# Patient Record
Sex: FEMALE | Race: WHITE | Employment: UNEMPLOYED | URBAN - NONMETROPOLITAN AREA
[De-identification: names, ages, dates, MRNs, and addresses within clinical notes are randomized per-mention and may not be internally consistent; named-entity substitution may affect disease eponyms.]

---

## 2021-04-13 ENCOUNTER — OFFICE VISIT (OUTPATIENT)
Dept: PRIMARY CARE CLINIC | Age: 51
End: 2021-04-13
Payer: COMMERCIAL

## 2021-04-13 ENCOUNTER — TRANSCRIBE ORDERS (OUTPATIENT)
Dept: ADMINISTRATIVE | Facility: HOSPITAL | Age: 51
End: 2021-04-13

## 2021-04-13 DIAGNOSIS — Z12.31 SCREENING MAMMOGRAM, ENCOUNTER FOR: ICD-10-CM

## 2021-04-13 DIAGNOSIS — Z12.4 SCREENING FOR MALIGNANT NEOPLASM OF CERVIX: ICD-10-CM

## 2021-04-13 DIAGNOSIS — I10 ESSENTIAL HYPERTENSION: ICD-10-CM

## 2021-04-13 DIAGNOSIS — Z13.1 SCREENING FOR DIABETES MELLITUS: ICD-10-CM

## 2021-04-13 DIAGNOSIS — Z13.220 ENCOUNTER FOR SCREENING FOR LIPID DISORDER: ICD-10-CM

## 2021-04-13 DIAGNOSIS — E55.9 VITAMIN D DEFICIENCY: ICD-10-CM

## 2021-04-13 DIAGNOSIS — Z00.00 WELL ADULT HEALTH CHECK: Primary | ICD-10-CM

## 2021-04-13 DIAGNOSIS — Z12.31 BREAST CANCER SCREENING BY MAMMOGRAM: Primary | ICD-10-CM

## 2021-04-13 DIAGNOSIS — Z00.00 WELL ADULT EXAM: ICD-10-CM

## 2021-04-13 DIAGNOSIS — R63.5 WEIGHT GAIN: ICD-10-CM

## 2021-04-13 LAB
ALBUMIN SERPL-MCNC: 4.6 G/DL (ref 3.5–5.2)
ALP BLD-CCNC: 122 U/L (ref 35–104)
ALT SERPL-CCNC: 27 U/L (ref 5–33)
ANION GAP SERPL CALCULATED.3IONS-SCNC: 17 MMOL/L (ref 7–19)
AST SERPL-CCNC: 28 U/L (ref 5–32)
BILIRUB SERPL-MCNC: 0.4 MG/DL (ref 0.2–1.2)
BUN BLDV-MCNC: 12 MG/DL (ref 6–20)
CALCIUM SERPL-MCNC: 9.7 MG/DL (ref 8.6–10)
CHLORIDE BLD-SCNC: 104 MMOL/L (ref 98–111)
CHOLESTEROL, TOTAL: 201 MG/DL (ref 160–199)
CO2: 23 MMOL/L (ref 22–29)
CREAT SERPL-MCNC: 0.7 MG/DL (ref 0.5–0.9)
GFR AFRICAN AMERICAN: >59
GFR NON-AFRICAN AMERICAN: >60
GLUCOSE BLD-MCNC: 86 MG/DL (ref 74–109)
HDLC SERPL-MCNC: 55 MG/DL (ref 65–121)
LDL CHOLESTEROL CALCULATED: 132 MG/DL
POTASSIUM SERPL-SCNC: 4 MMOL/L (ref 3.5–5)
SODIUM BLD-SCNC: 144 MMOL/L (ref 136–145)
TOTAL PROTEIN: 8.2 G/DL (ref 6.6–8.7)
TRIGL SERPL-MCNC: 68 MG/DL (ref 0–149)
TSH SERPL DL<=0.05 MIU/L-ACNC: 2.15 UIU/ML (ref 0.27–4.2)
VITAMIN D 25-HYDROXY: 49.6 NG/ML

## 2021-04-13 PROCEDURE — 36415 COLL VENOUS BLD VENIPUNCTURE: CPT | Performed by: NURSE PRACTITIONER

## 2021-04-13 PROCEDURE — 99386 PREV VISIT NEW AGE 40-64: CPT | Performed by: NURSE PRACTITIONER

## 2021-04-13 RX ORDER — ERGOCALCIFEROL 1.25 MG/1
50000 CAPSULE ORAL WEEKLY
Qty: 12 CAPSULE | Refills: 1 | Status: ON HOLD | OUTPATIENT
Start: 2021-04-13 | End: 2021-07-28

## 2021-04-13 RX ORDER — LISINOPRIL 10 MG/1
10 TABLET ORAL DAILY
Qty: 30 TABLET | Refills: 5 | Status: SHIPPED | OUTPATIENT
Start: 2021-04-13 | End: 2021-05-06 | Stop reason: ALTCHOICE

## 2021-04-13 ASSESSMENT — PATIENT HEALTH QUESTIONNAIRE - PHQ9
SUM OF ALL RESPONSES TO PHQ QUESTIONS 1-9: 0
1. LITTLE INTEREST OR PLEASURE IN DOING THINGS: 0
2. FEELING DOWN, DEPRESSED OR HOPELESS: 0

## 2021-04-13 NOTE — PROGRESS NOTES
[facility] PHYSICIAN SERVICES  [facility]  [address] [place]  [address] [place] [address]  Dept: [phone]  Dept Fax: [phone]  Loc: [phone]    Disha Russell is a 52 y.o. female who presents today for her medical conditions/complaints as noted below. Disha Russell is c/o of Established New Doctor (patient presents today to establish care. Former patient of Dr. Honey Bro. Patient states that she didnt have a PCP. She is needing a pap and mammogram. Patient is fasting for labs. )        HPI:     HPI   Chief Complaint   Patient presents with    Established New Doctor     patient presents today to establish care. Former patient of Dr. Honey Bro. Patient states that she didnt have a PCP. She is needing a pap and mammogram. Patient is fasting for labs. History reviewed. No pertinent past medical history. Past Surgical History:   Procedure Laterality Date     SECTION      DILATION AND CURETTAGE OF UTERUS      X2       Vitals    SYSTOLIC 380   DIASTOLIC 871   Pulse 82   Temp 97.7   Resp 16   SpO2 98   Weight 187 lb   Height 5' 5.5\"   Body mass index 30.64 kg/m2       Family History   Problem Relation Age of Onset    Hypertension Mother     Thyroid Disease Sister        Social History     Tobacco Use    Smoking status: Never Smoker    Smokeless tobacco: Never Used   Substance Use Topics    Alcohol use: Never     Frequency: Never      Current Outpatient Medications   Medication Sig Dispense Refill    lisinopril (PRINIVIL;ZESTRIL) 10 MG tablet Take 1 tablet by mouth daily 30 tablet 5    vitamin D (ERGOCALCIFEROL) 1.25 MG (13636 UT) CAPS capsule Take 1 capsule by mouth once a week 12 capsule 1     No current facility-administered medications for this visit.       No Known Allergies    Health Maintenance   Topic Date Due    Hepatitis C screen  Never done    HIV screen  Never done    DTaP/Tdap/Td vaccine (1 - Tdap) Never done    Cervical cancer screen  Never done    Flu vaccine (Season Ended) 09/01/2021    Potassium monitoring  04/13/2022    Creatinine monitoring  04/13/2022    Lipid screen  04/13/2026    COVID-19 Vaccine  Completed    Hepatitis A vaccine  Aged Out    Hepatitis B vaccine  Aged Out    Hib vaccine  Aged Out    Meningococcal (ACWY) vaccine  Aged Out    Pneumococcal 0-64 years Vaccine  Aged Out       Subjective:      Review of Systems   Constitutional: Negative. HENT: Negative. Eyes: Negative. Respiratory: Negative. Cardiovascular:        Elevated bp   Gastrointestinal: Negative. Genitourinary: Negative. Musculoskeletal: Negative. Skin: Negative. Neurological: Negative. Psychiatric/Behavioral: Negative. Objective:     Physical Exam  Vitals signs and nursing note reviewed. Exam conducted with a chaperone present. Constitutional:       Appearance: She is well-developed. HENT:      Head: Normocephalic. Right Ear: Tympanic membrane and external ear normal.      Left Ear: External ear normal.      Nose: Nose normal.   Eyes:      Pupils: Pupils are equal, round, and reactive to light. Neck:      Musculoskeletal: Normal range of motion. Cardiovascular:      Rate and Rhythm: Normal rate and regular rhythm. Pulses: Normal pulses. Heart sounds: Normal heart sounds. Pulmonary:      Effort: Pulmonary effort is normal.      Breath sounds: Normal breath sounds. Chest:      Breasts:         Right: Normal.         Left: Normal.   Abdominal:      General: Abdomen is flat. Bowel sounds are normal.   Genitourinary:     Labia:         Right: No rash, tenderness, lesion or injury. Left: No rash, tenderness, lesion or injury. Vagina: Normal.      Cervix: Normal.      Uterus: Normal.       Adnexa: Right adnexa normal and left adnexa normal.      Comments: Pap smear obtained from the cervix  Musculoskeletal: Normal range of motion. Skin:     General: Skin is warm and dry.       Capillary Refill: Capillary refill takes less than 2 seconds. Neurological:      General: No focal deficit present. Mental Status: She is alert and oriented to person, place, and time. Psychiatric:         Mood and Affect: Mood normal.         Behavior: Behavior normal.         Thought Content: Thought content normal.         Judgment: Judgment normal.       BP (!) 133/100   Pulse 82   Temp 97.7 °F (36.5 °C) (Temporal)   Resp 16   Ht 5' 5.5\" (1.664 m)   Wt 187 lb (84.8 kg)   SpO2 98%   Breastfeeding No   BMI 30.65 kg/m²     Assessment:       Diagnosis Orders   1. Well adult health check     2. Screening mammogram, encounter for  RAIN DIGITAL SCREEN W OR WO CAD BILATERAL   3. Well adult exam     4. Encounter for screening for lipid disorder  Lipid Panel   5. Screening for diabetes mellitus  Comprehensive Metabolic Panel   6. Weight gain  TSH without Reflex   7. Screening for malignant neoplasm of cervix  PAP SMEAR   8. Essential hypertension     9. Vitamin D deficiency  Vitamin D 25 Hydroxy         Plan:       Incidentally her blood pressure was found to be elevated today and on recheck it was also elevated. See the plan below     Patient given educational materials -see patient instructions. Discussed use, benefit, and side effects of prescribed medications. All patient questions answered. Pt voiced understanding. Reviewed health maintenance. Instructed to continue currentmedications, diet and exercise. Patient agreed with treatment plan. Follow up as directed.    MEDICATIONS:  Orders Placed This Encounter   Medications    lisinopril (PRINIVIL;ZESTRIL) 10 MG tablet     Sig: Take 1 tablet by mouth daily     Dispense:  30 tablet     Refill:  5    vitamin D (ERGOCALCIFEROL) 1.25 MG (06931 UT) CAPS capsule     Sig: Take 1 capsule by mouth once a week     Dispense:  12 capsule     Refill:  1         ORDERS:  Orders Placed This Encounter   Procedures    RAIN DIGITAL SCREEN W OR WO CAD BILATERAL    Comprehensive Metabolic Panel    Lipid Panel    TSH without Reflex    PAP SMEAR    Vitamin D 25 Hydroxy       Follow-up:  Return in about 1 year (around 4/13/2022). PATIENT INSTRUCTIONS:  Patient Instructions   Start the lisinopril daily  Monitor your blood pressure every a.m And once random during the day. Record them. The goal is top number under 140 and bottom number under 80. Will take about 3 days to get to goal. Call or send a [facility] message with bp in the next week. Work on diet and exercise  Continue vit d rx. Avoid any deconestants     Patient Education        DASH Diet: Care Instructions  Your Care Instructions     The DASH diet is an eating plan that can help lower your blood pressure. DASH stands for Dietary Approaches to Stop Hypertension. Hypertension is high blood pressure. The DASH diet focuses on eating foods that are high in calcium, potassium, and magnesium. These nutrients can lower blood pressure. The foods that are highest in these nutrients are fruits, vegetables, low-fat dairy products, nuts, seeds, and legumes. But taking calcium, potassium, and magnesium supplements instead of eating foods that are high in those nutrients does not have the same effect. The DASH diet also includes whole grains, fish, and poultry. The DASH diet is one of several lifestyle changes your doctor may recommend to lower your high blood pressure. Your doctor may also want you to decrease the amount of sodium in your diet. Lowering sodium while following the DASH diet can lower blood pressure even further than just the DASH diet alone. Follow-up care is a key part of your treatment and safety. Be sure to make and go to all appointments, and call your doctor if you are having problems. It's also a good idea to know your test results and keep a list of the medicines you take. How can you care for yourself at home? Following the DASH diet  · Eat 4 to 5 servings of fruit each day.  A serving is 1 medium-sized piece of fruit, ½ cup chopped or canned fruit, 1/4 cup dried fruit, or 4 ounces (½ cup) of fruit juice. Choose fruit more often than fruit juice. · Eat 4 to 5 servings of vegetables each day. A serving is 1 cup of lettuce or raw leafy vegetables, ½ cup of chopped or cooked vegetables, or 4 ounces (½ cup) of vegetable juice. Choose vegetables more often than vegetable juice. · Get 2 to 3 servings of low-fat and fat-free dairy each day. A serving is 8 ounces of milk, 1 cup of yogurt, or 1 ½ ounces of cheese. · Eat 6 to 8 servings of grains each day. A serving is 1 slice of bread, 1 ounce of dry cereal, or ½ cup of cooked rice, pasta, or cooked cereal. Try to choose whole-grain products as much as possible. · Limit lean meat, poultry, and fish to 2 servings each day. A serving is 3 ounces, about the size of a deck of cards. · Eat 4 to 5 servings of nuts, seeds, and legumes (cooked dried beans, lentils, and split peas) each week. A serving is 1/3 cup of nuts, 2 tablespoons of seeds, or ½ cup of cooked beans or peas. · Limit fats and oils to 2 to 3 servings each day. A serving is 1 teaspoon of vegetable oil or 2 tablespoons of salad dressing. · Limit sweets and added sugars to 5 servings or less a week. A serving is 1 tablespoon jelly or jam, ½ cup sorbet, or 1 cup of lemonade. · Eat less than 2,300 milligrams (mg) of sodium a day. If you limit your sodium to 1,500 mg a day, you can lower your blood pressure even more. · Be aware that all of these are the suggested number of servings for people who eat 1,800 to 2,000 calories a day. Your recommended number of servings may be different if you need more or fewer calories. Tips for success  · Start small. Do not try to make dramatic changes to your diet all at once. You might feel that you are missing out on your favorite foods and then be more likely to not follow the plan. Make small changes, and stick with them. Once those changes become habit, add a few more changes.   · Try language to printed texts. The electronic translation of spoken language may be erroneous, or at times, nonsensical words or phrases may be inadvertentlytranscribed.   Although I have reviewed the note for such errors, some may still exist.

## 2021-04-13 NOTE — PATIENT INSTRUCTIONS
of lettuce or raw leafy vegetables, ½ cup of chopped or cooked vegetables, or 4 ounces (½ cup) of vegetable juice. Choose vegetables more often than vegetable juice. · Get 2 to 3 servings of low-fat and fat-free dairy each day. A serving is 8 ounces of milk, 1 cup of yogurt, or 1 ½ ounces of cheese. · Eat 6 to 8 servings of grains each day. A serving is 1 slice of bread, 1 ounce of dry cereal, or ½ cup of cooked rice, pasta, or cooked cereal. Try to choose whole-grain products as much as possible. · Limit lean meat, poultry, and fish to 2 servings each day. A serving is 3 ounces, about the size of a deck of cards. · Eat 4 to 5 servings of nuts, seeds, and legumes (cooked dried beans, lentils, and split peas) each week. A serving is 1/3 cup of nuts, 2 tablespoons of seeds, or ½ cup of cooked beans or peas. · Limit fats and oils to 2 to 3 servings each day. A serving is 1 teaspoon of vegetable oil or 2 tablespoons of salad dressing. · Limit sweets and added sugars to 5 servings or less a week. A serving is 1 tablespoon jelly or jam, ½ cup sorbet, or 1 cup of lemonade. · Eat less than 2,300 milligrams (mg) of sodium a day. If you limit your sodium to 1,500 mg a day, you can lower your blood pressure even more. · Be aware that all of these are the suggested number of servings for people who eat 1,800 to 2,000 calories a day. Your recommended number of servings may be different if you need more or fewer calories. Tips for success  · Start small. Do not try to make dramatic changes to your diet all at once. You might feel that you are missing out on your favorite foods and then be more likely to not follow the plan. Make small changes, and stick with them. Once those changes become habit, add a few more changes. · Try some of the following:  ? Make it a goal to eat a fruit or vegetable at every meal and at snacks. This will make it easy to get the recommended amount of fruits and vegetables each day.   ? Try

## 2021-04-15 ASSESSMENT — ENCOUNTER SYMPTOMS
GASTROINTESTINAL NEGATIVE: 1
RESPIRATORY NEGATIVE: 1
EYES NEGATIVE: 1

## 2021-04-16 VITALS
HEART RATE: 82 BPM | BODY MASS INDEX: 30.05 KG/M2 | WEIGHT: 187 LBS | RESPIRATION RATE: 16 BRPM | HEIGHT: 66 IN | TEMPERATURE: 97.7 F | SYSTOLIC BLOOD PRESSURE: 114 MMHG | OXYGEN SATURATION: 98 % | DIASTOLIC BLOOD PRESSURE: 72 MMHG

## 2021-04-19 ENCOUNTER — HOSPITAL ENCOUNTER (OUTPATIENT)
Dept: MAMMOGRAPHY | Facility: HOSPITAL | Age: 51
Discharge: HOME | End: 2021-04-19
Admitting: NURSE PRACTITIONER

## 2021-04-19 DIAGNOSIS — Z12.31 BREAST CANCER SCREENING BY MAMMOGRAM: ICD-10-CM

## 2021-04-19 PROCEDURE — 77063 BREAST TOMOSYNTHESIS BI: CPT

## 2021-04-19 PROCEDURE — 77067 SCR MAMMO BI INCL CAD: CPT

## 2021-04-23 ENCOUNTER — HOSPITAL ENCOUNTER (OUTPATIENT)
Dept: MAMMOGRAPHY | Facility: HOSPITAL | Age: 51
Discharge: HOME | End: 2021-04-23
Admitting: NURSE PRACTITIONER

## 2021-04-23 DIAGNOSIS — R92.8 ABNORMAL MAMMOGRAM: ICD-10-CM

## 2021-04-23 PROCEDURE — G0279 TOMOSYNTHESIS, MAMMO: HCPCS

## 2021-04-23 PROCEDURE — 77065 DX MAMMO INCL CAD UNI: CPT

## 2021-04-27 ENCOUNTER — TELEPHONE (OUTPATIENT)
Dept: PRIMARY CARE CLINIC | Age: 51
End: 2021-04-27

## 2021-04-27 DIAGNOSIS — R92.8 ABNORMAL MAMMOGRAM OF LEFT BREAST: ICD-10-CM

## 2021-04-27 DIAGNOSIS — Z12.31 SCREENING MAMMOGRAM, ENCOUNTER FOR: ICD-10-CM

## 2021-04-27 DIAGNOSIS — R92.1 BREAST CALCIFICATION, LEFT: Primary | ICD-10-CM

## 2021-04-27 NOTE — TELEPHONE ENCOUNTER
Abnormal mammogram talked to [facility] the nurse navigator at [facility] and send an order for a breast biopsy.

## 2021-04-30 ENCOUNTER — HOSPITAL ENCOUNTER (OUTPATIENT)
Dept: MAMMOGRAPHY | Facility: HOSPITAL | Age: 51
Discharge: HOME | End: 2021-04-30

## 2021-04-30 DIAGNOSIS — R92.8 ABNORMAL MAMMOGRAM: ICD-10-CM

## 2021-04-30 PROCEDURE — A4648 IMPLANTABLE TISSUE MARKER: HCPCS

## 2021-04-30 PROCEDURE — 88305 TISSUE EXAM BY PATHOLOGIST: CPT | Performed by: NURSE PRACTITIONER

## 2021-04-30 RX ORDER — LIDOCAINE HYDROCHLORIDE AND EPINEPHRINE 10; 10 MG/ML; UG/ML
10 INJECTION, SOLUTION INFILTRATION; PERINEURAL ONCE
Status: DISPENSED | OUTPATIENT
Start: 2021-04-30

## 2021-04-30 RX ORDER — LIDOCAINE HYDROCHLORIDE 10 MG/ML
10 INJECTION, SOLUTION INFILTRATION; PERINEURAL ONCE
Status: DISPENSED | OUTPATIENT
Start: 2021-04-30

## 2021-05-06 DIAGNOSIS — D05.12 DUCTAL CARCINOMA IN SITU (DCIS) OF LEFT BREAST: Primary | ICD-10-CM

## 2021-05-06 LAB
CYTO UR: NORMAL
LAB AP CASE REPORT: NORMAL
LAB AP CLINICAL INFORMATION: NORMAL
LAB AP DIAGNOSIS COMMENT: NORMAL
PATH REPORT.FINAL DX SPEC: NORMAL
PATH REPORT.GROSS SPEC: NORMAL

## 2021-05-06 RX ORDER — LISINOPRIL 5 MG/1
2.5 TABLET ORAL DAILY
Qty: 45 TABLET | Refills: 1 | Status: SHIPPED | OUTPATIENT
Start: 2021-05-06 | End: 2021-12-06

## 2021-05-07 ENCOUNTER — TELEPHONE (OUTPATIENT)
Dept: SURGERY | Age: 51
End: 2021-05-07

## 2021-05-07 NOTE — TELEPHONE ENCOUNTER
Sandy @ [facility] called to advise that patient had testing done at [facility]

## 2021-05-11 ENCOUNTER — OFFICE VISIT (OUTPATIENT)
Dept: SURGERY | Age: 51
End: 2021-05-11
Payer: COMMERCIAL

## 2021-05-11 VITALS
HEIGHT: 65 IN | WEIGHT: 178 LBS | DIASTOLIC BLOOD PRESSURE: 86 MMHG | TEMPERATURE: 98.7 F | BODY MASS INDEX: 29.66 KG/M2 | SYSTOLIC BLOOD PRESSURE: 127 MMHG | HEART RATE: 68 BPM

## 2021-05-11 DIAGNOSIS — N60.92 ATYPICAL DUCTAL HYPERPLASIA OF LEFT BREAST: Primary | ICD-10-CM

## 2021-05-11 PROCEDURE — 99204 OFFICE O/P NEW MOD 45 MIN: CPT | Performed by: PHYSICIAN ASSISTANT

## 2021-05-18 ENCOUNTER — PATIENT MESSAGE (OUTPATIENT)
Dept: SURGERY | Age: 51
End: 2021-05-18

## 2021-05-20 NOTE — TELEPHONE ENCOUNTER
I spoke with Patient and I have rescheduled her to 6/23/2021 day after she is scheduled for her MRI.  She is on a wait list and if MRI is rescheduled sooner we will get her in sooner with Dr. Susan Dunn

## 2021-05-25 ENCOUNTER — HOSPITAL ENCOUNTER (OUTPATIENT)
Dept: MRI IMAGING | Age: 51
Discharge: HOME | End: 2021-05-25
Payer: COMMERCIAL

## 2021-05-25 DIAGNOSIS — N60.92 ATYPICAL DUCTAL HYPERPLASIA OF LEFT BREAST: ICD-10-CM

## 2021-05-25 PROCEDURE — 77049 MRI BREAST C-+ W/CAD BI: CPT

## 2021-05-25 PROCEDURE — 6360000004 HC RX CONTRAST MEDICATION: Performed by: PHYSICIAN ASSISTANT

## 2021-05-25 PROCEDURE — A9577 INJ MULTIHANCE: HCPCS | Performed by: PHYSICIAN ASSISTANT

## 2021-05-25 RX ADMIN — GADOBENATE DIMEGLUMINE 16 ML: 529 INJECTION, SOLUTION INTRAVENOUS at 11:32

## 2021-05-26 DIAGNOSIS — R93.89 ABNORMAL FINDING ON IMAGING: Primary | ICD-10-CM

## 2021-05-27 ENCOUNTER — OFFICE VISIT (OUTPATIENT)
Dept: SURGERY | Age: 51
End: 2021-05-27
Payer: COMMERCIAL

## 2021-05-27 ENCOUNTER — HOSPITAL ENCOUNTER (OUTPATIENT)
Dept: WOMENS IMAGING | Age: 51
Discharge: HOME | End: 2021-05-27
Payer: COMMERCIAL

## 2021-05-27 DIAGNOSIS — N63.0 LUMP OR MASS IN BREAST: ICD-10-CM

## 2021-05-27 DIAGNOSIS — R92.8 ABNORMAL MRI, BREAST: ICD-10-CM

## 2021-05-27 DIAGNOSIS — R93.89 ABNORMAL FINDING ON IMAGING: ICD-10-CM

## 2021-05-27 DIAGNOSIS — R93.89 ABNORMAL FINDING ON IMAGING: Primary | ICD-10-CM

## 2021-05-27 DIAGNOSIS — N60.99 ATYPICAL DUCTAL HYPERPLASIA OF BREAST: ICD-10-CM

## 2021-05-27 PROCEDURE — 76642 ULTRASOUND BREAST LIMITED: CPT

## 2021-05-27 PROCEDURE — 99215 OFFICE O/P EST HI 40 MIN: CPT | Performed by: SURGERY

## 2021-05-27 PROCEDURE — 99417 PROLNG OP E/M EACH 15 MIN: CPT | Performed by: SURGERY

## 2021-05-27 NOTE — PROGRESS NOTES
HISTORY OF PRESENT ILLNESS:    Ms. Teja Montemayor  is recently status post stereotactic breast biopsy she had at [facility] on the left which revealed a focus of atypical ductal hyperplasia which is suspicious for but not diagnostic of ductal carcinoma in situ. This was sent to Dr. Angel Das in [place]. MRI-5/26/2021  FINDINGS:  There is a large 8.5 x 3.3 x 5.4 cm area of non-mass enhancement in   the LEFT upper outer breast, located along the lateral and inferior   aspect of the biopsy clip marker. No focal suspicious solid mass. No   mass adjacent to the biopsy clip marker. No abnormal LEFT breast skin   thickening or nipple enhancement. No abnormal enhancement or suspicious mass in the RIGHT breast. No   abnormal RIGHT breast skin thickening or nipple enhancement. No enlarged axillary or internal mammary lymph nodes. Limited   evaluation of the anterior chest and upper abdomen is unremarkable. No   suspicious osseous lesions.       Impression   Impression:   1.  Large 8.5 x 3.3 x 5.4 cm area of regional non-mass enhancement in   the LEFT upper outer breast, with differential diagnosis including   DCIS. 2.  No suspicious focal mass in either breast. No focal mass adjacent   to the LEFT breast biopsy clip marker. 3.  No axillary or internal mammary lymphadenopathy. BI-RADS CATEGORY 6: KNOWN BIOPSY WITH PROVEN MALIGNANCY. Signed by Dr Karely Jacome on 5/26/2021 8:20 AM     Her biopsy clip is not in the large area of non-mass-like enhancement. This is a asymmetric finding. Review of her mammogram appears to show density in this area that may be abnormal.  We also did ultrasound of this area which shows some very dense tissue which should be amenable to ultrasound-guided biopsy. DISCUSSION:  I had a lengthy discussion with Ms. Teja Montemayor  and her family about the ramifications of the diagnosis of breast cancer.   We discussed the pathophysiology of cancer in general and also the ways in which surgery, radiation therapy, and chemotherapy are utilized in the treatment of different types of cancers. We also explained how these modalities related to her situation in particular. We discussed the pathophysiology of breast cancer and some length, including what is known about the causes of breast cancer, its relationship to fibrocystic disease, its relationship to hormone replacement therapy, and some of the genetic aspects involved in familial breast cancers. We discussed the BrCa genetic analysis and why it is appropriate for her. We discussed breast MRI and how it assists in evaluation of breast cancers and the results of her MRI if done. We also discussed that this is not cancer as yet and final decisions will based on her upcoming pathology    We discussed the surgical options including simple mastectomy and lumpectomy with  sentinel lymph node biopsy as well as the possibility of axillary lymph node dissection. We explained in depth why breast conservation therapy requires radiation treatments for the majority of women. These treatments may be external beam for 6 weeks, partial breast for 5 days,  or intraoperative. I explained that most women treated for invasive malignancy do receive systemic therapy, hormonal therapy or  chemotherapy postoperatively depending upon the final pathology, the lymph node status, and the hormone receptor status. I discussed Oncotype Dx, Mammoprint, and Adjuvant Online as tools which aid in the decision for chemotherapy or hormonal therapy. We also discussed the possibility of breast reconstruction if a mastectomy was required. .  I explained to her the different techniques including placement of a subpectoral implant with a Alloderm sling  versus TRAM flap reconstruction as welll as other methods of reconstruction. She does not wish to pursue reconstruction at this time.         After a prolonged discussion lasting 100 minutes  we felt it was most appropriate that she undergo ultrasound-guided mammotome biopsy of this dense tissue. If post procedure images do not confirm that this is the area of interest we will ask radiology to perform stereotactic biopsy as well. Further plans will be based on the results of this biopsy. .      We will schedule this to be done next week  at [facility].

## 2021-05-28 PROBLEM — N60.99 ATYPICAL DUCTAL HYPERPLASIA OF BREAST: Status: ACTIVE | Noted: 2021-05-28

## 2021-05-28 PROBLEM — N63.0 LUMP OR MASS IN BREAST: Status: ACTIVE | Noted: 2021-05-28

## 2021-06-02 ENCOUNTER — PROCEDURE VISIT (OUTPATIENT)
Dept: SURGERY | Age: 51
End: 2021-06-02

## 2021-06-02 ENCOUNTER — PROCEDURE VISIT (OUTPATIENT)
Dept: SURGERY | Age: 51
End: 2021-06-02
Payer: COMMERCIAL

## 2021-06-02 ENCOUNTER — HOSPITAL ENCOUNTER (OUTPATIENT)
Dept: WOMENS IMAGING | Age: 51
Discharge: HOME | End: 2021-06-02
Payer: COMMERCIAL

## 2021-06-02 DIAGNOSIS — N63.0 LUMP OR MASS IN BREAST: Primary | ICD-10-CM

## 2021-06-02 DIAGNOSIS — N60.99 ATYPICAL DUCTAL HYPERPLASIA OF BREAST: ICD-10-CM

## 2021-06-02 DIAGNOSIS — R93.89 ABNORMAL FINDING ON IMAGING: ICD-10-CM

## 2021-06-02 PROCEDURE — 77065 DX MAMMO INCL CAD UNI: CPT

## 2021-06-02 PROCEDURE — 19083 BX BREAST 1ST LESION US IMAG: CPT | Performed by: SURGERY

## 2021-06-02 PROCEDURE — 88305 TISSUE EXAM BY PATHOLOGIST: CPT

## 2021-06-04 ENCOUNTER — TELEPHONE (OUTPATIENT)
Dept: SURGERY | Age: 51
End: 2021-06-04

## 2021-06-04 PROBLEM — D05.12 DUCTAL CARCINOMA IN SITU (DCIS) OF LEFT BREAST: Status: ACTIVE | Noted: 2021-06-04

## 2021-06-04 NOTE — TELEPHONE ENCOUNTER
Spoke with patient and gave results. Please schedule breast talk appt for the last week of June. She is out of town right now for the birth of her grandchild. I will have the lab mail her a kit for genetic testing.

## 2021-06-04 NOTE — TELEPHONE ENCOUNTER
I sent Patient a [employer] Message to see what dates will work for her. Dr. Consuelo Garcia will be on Vacation after 6/23.

## 2021-06-18 NOTE — PROGRESS NOTES
HISTORY OF PRESENT ILLNESS:    Ms. 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 ER is positive at 94%. MRI-5/26/2021  FINDINGS:  There is a large 8.5 x 3.3 x 5.4 cm area of non-mass enhancement in   the LEFT upper outer breast, located along the lateral and inferior   aspect of the biopsy clip marker. No focal suspicious solid mass. No   mass adjacent to the biopsy clip marker. No abnormal LEFT breast skin   thickening or nipple enhancement. No abnormal enhancement or suspicious mass in the RIGHT breast. No   abnormal RIGHT breast skin thickening or nipple enhancement. No enlarged axillary or internal mammary lymph nodes. Limited   evaluation of the anterior chest and upper abdomen is unremarkable. No   suspicious osseous lesions.       Impression   Impression:   1.  Large 8.5 x 3.3 x 5.4 cm area of regional non-mass enhancement in   the LEFT upper outer breast, with differential diagnosis including   DCIS. 2.  No suspicious focal mass in either breast. No focal mass adjacent   to the LEFT breast biopsy clip marker. 3.  No axillary or internal mammary lymphadenopathy. BI-RADS CATEGORY 6: KNOWN BIOPSY WITH PROVEN MALIGNANCY. Signed by Dr Verena Simons on 5/26/2021 8:20 AM     Review of her MRI and her mammogram demonstrated an area of significant concern which is distinctly separate from the site of her initial biopsy. 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 This area however spreads over an 8 cm portion of her breast    DISCUSSION:  I had a lengthy discussion with Ms. Swapna Farah  and her family about the ramifications of the diagnosis of breast cancer.   We discussed the pathophysiology of cancer in general and also the ways in which surgery, radiation therapy, and chemotherapy are utilized in the treatment of different types of cancers. We also explained how these modalities related to her situation in particular. We discussed the pathophysiology of breast cancer and some length, including what is known about the causes of breast cancer, its relationship to fibrocystic disease, its relationship to hormone replacement therapy, and some of the genetic aspects involved in familial breast cancers. We discussed the BrCa genetic analysis and why it is appropriate for her. We discussed breast MRI and how it assists in evaluation of breast cancers and the results of her MRI if done. We discussed the surgical options including simple mastectomy and lumpectomy with  sentinel lymph node biopsy as well as the possibility of axillary lymph node dissection. We explained in depth why breast conservation therapy requires radiation treatments for the majority of women. These treatments may be external beam for 6 weeks, partial breast for 5 days,  or intraoperative. I explained that most women treated for invasive malignancy do receive systemic therapy, hormonal therapy or  chemotherapy postoperatively depending upon the final pathology, the lymph node status, and the hormone receptor status. I discussed Oncotype Dx, Mammoprint, and Adjuvant Online as tools which aid in the decision for chemotherapy or hormonal therapy. We also discussed the possibility of breast reconstruction if a mastectomy was required. .  I explained to her the different techniques including placement of a subpectoral implant with a Alloderm sling  versus TRAM flap reconstruction as welll as other methods of reconstruction. She does wish to pursue reconstruction at this time. After a prolonged discussion lasting 110 minutes  we felt it was most appropriate that she start Singulair and letrozole today. We will send her to the [facility] plastic surgeons to discuss possible Shana flaps.   I will see her back after this to discuss exactly what she wants to do. At this point she is unsure about whether to do one side or both sides and what she wants in terms of reconstruction. She would rather have native tissue then foreign body. I will see her back after she sees the plastic surgeon.   She also needs genetic testing with [facility] Biswas

## 2021-06-21 ENCOUNTER — INITIAL CONSULT (OUTPATIENT)
Dept: SURGERY | Age: 51
End: 2021-06-21
Payer: COMMERCIAL

## 2021-06-21 DIAGNOSIS — Z71.9 ENCOUNTER FOR CONSULTATION: Primary | ICD-10-CM

## 2021-06-21 DIAGNOSIS — D05.12 DUCTAL CARCINOMA IN SITU (DCIS) OF LEFT BREAST: ICD-10-CM

## 2021-06-21 PROCEDURE — 99215 OFFICE O/P EST HI 40 MIN: CPT | Performed by: SURGERY

## 2021-06-21 PROCEDURE — 99417 PROLNG OP E/M EACH 15 MIN: CPT | Performed by: SURGERY

## 2021-06-21 NOTE — Clinical Note
Appointment with plastic surgeons in [facility] think already done Follow-up with me after she sees them

## 2021-06-25 RX ORDER — TAMOXIFEN CITRATE 20 MG/1
20 TABLET ORAL DAILY
Qty: 30 TABLET | Refills: 3 | Status: SHIPPED | OUTPATIENT
Start: 2021-06-25 | End: 2021-08-31

## 2021-07-09 NOTE — PROGRESS NOTES
HISTORY OF PRESENT ILLNESS:    Ms. Danny Chen presents today for a follow up from seeing Dr. Bridget Rod to discuss surgery. She is wanting a unilateral left mammogram with tissue expander placement at that time. This will be followed in 3 or 4 weeks by a Shana flap autologous microvascular reconstruction in [place] [id number].    She is recently status post ultrasound guided breast biopsy  on the left which revealed a tiny micropapillary ductal   carcinoma in situ. ER is positive at 94%. MRI-5/26/2021  FINDINGS:  There is a large 8.5 x 3.3 x 5.4 cm area of non-mass enhancement in   the LEFT upper outer breast, located along the lateral and inferior   aspect of the biopsy clip marker. No focal suspicious solid mass. No   mass adjacent to the biopsy clip marker. No abnormal LEFT breast skin   thickening or nipple enhancement. No abnormal enhancement or suspicious mass in the RIGHT breast. No   abnormal RIGHT breast skin thickening or nipple enhancement. No enlarged axillary or internal mammary lymph nodes. Limited   evaluation of the anterior chest and upper abdomen is unremarkable. No   suspicious osseous lesions.       Impression   Impression:   1.  Large 8.5 x 3.3 x 5.4 cm area of regional non-mass enhancement in   the LEFT upper outer breast, with differential diagnosis including   DCIS. 2.  No suspicious focal mass in either breast. No focal mass adjacent   to the LEFT breast biopsy clip marker. 3.  No axillary or internal mammary lymphadenopathy. BI-RADS CATEGORY 6: KNOWN BIOPSY WITH PROVEN MALIGNANCY. Signed by Dr Maricarmen Amaro on 5/26/2021 8:20 AM     Review of her MRI and her mammogram demonstrated an area of significant concern which is distinctly separate from the site of her initial biopsy. 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   This area however spreads over an 8 cm portion of her breast    DISCUSSION:  I had a lengthy discussion with Ms. Chanell Hood  and her family about the ramifications of the diagnosis of breast cancer. We discussed the pathophysiology of cancer in general and also the ways in which surgery, radiation therapy, and chemotherapy are utilized in the treatment of different types of cancers. We also explained how these modalities related to her situation in particular. We discussed the pathophysiology of breast cancer and some length, including what is known about the causes of breast cancer, its relationship to fibrocystic disease, its relationship to hormone replacement therapy, and some of the genetic aspects involved in familial breast cancers. We discussed the BrCa genetic analysis and why it is appropriate for her. We discussed breast MRI and how it assists in evaluation of breast cancers and the results of her MRI if done. We discussed the surgical options including simple mastectomy and lumpectomy with  sentinel lymph node biopsy as well as the possibility of axillary lymph node dissection. We explained in depth why breast conservation therapy requires radiation treatments for the majority of women. These treatments may be external beam for 6 weeks, partial breast for 5 days,  or intraoperative. I explained that most women treated for invasive malignancy do receive systemic therapy, hormonal therapy or  chemotherapy postoperatively depending upon the final pathology, the lymph node status, and the hormone receptor status. I discussed Oncotype Dx, Mammoprint, and Adjuvant Online as tools which aid in the decision for chemotherapy or hormonal therapy. We also discussed the possibility of breast reconstruction if a mastectomy was required. .  I explained to her the different techniques including placement of a subpectoral implant with a Alloderm sling  versus TRAM flap reconstruction as welll as other methods of reconstruction.   She does wish to pursue

## 2021-07-12 ENCOUNTER — OFFICE VISIT (OUTPATIENT)
Dept: SURGERY | Age: 51
End: 2021-07-12
Payer: COMMERCIAL

## 2021-07-12 VITALS — HEART RATE: 76 BPM | SYSTOLIC BLOOD PRESSURE: 110 MMHG | DIASTOLIC BLOOD PRESSURE: 72 MMHG

## 2021-07-12 DIAGNOSIS — D05.12 DUCTAL CARCINOMA IN SITU (DCIS) OF LEFT BREAST: Primary | ICD-10-CM

## 2021-07-12 PROCEDURE — 99214 OFFICE O/P EST MOD 30 MIN: CPT | Performed by: SURGERY

## 2021-07-12 NOTE — Clinical Note
Left nipple sparing mastectomy-Wise pattern Tissue expander reconstruction with no AlloDerm Day before to office for marking, PT, etc.

## 2021-07-26 ENCOUNTER — HOSPITAL ENCOUNTER (OUTPATIENT)
Dept: PREADMISSION TESTING | Age: 51
Discharge: HOME | End: 2021-07-30
Payer: COMMERCIAL

## 2021-07-26 ENCOUNTER — PROCEDURE VISIT (OUTPATIENT)
Dept: SURGERY | Age: 51
End: 2021-07-26
Payer: COMMERCIAL

## 2021-07-26 VITALS — HEIGHT: 65 IN | BODY MASS INDEX: 29.82 KG/M2 | WEIGHT: 179 LBS

## 2021-07-26 DIAGNOSIS — D05.12 DUCTAL CARCINOMA IN SITU (DCIS) OF LEFT BREAST: ICD-10-CM

## 2021-07-26 LAB
ANION GAP SERPL CALCULATED.3IONS-SCNC: 9 MMOL/L (ref 7–19)
BASOPHILS ABSOLUTE: 0.1 K/UL (ref 0–0.2)
BASOPHILS RELATIVE PERCENT: 0.8 % (ref 0–1)
BUN BLDV-MCNC: 14 MG/DL (ref 6–20)
CALCIUM SERPL-MCNC: 9.4 MG/DL (ref 8.6–10)
CHLORIDE BLD-SCNC: 106 MMOL/L (ref 98–111)
CO2: 28 MMOL/L (ref 22–29)
CREAT SERPL-MCNC: 0.7 MG/DL (ref 0.5–0.9)
EKG P AXIS: 50 DEGREES
EKG P-R INTERVAL: 134 MS
EKG Q-T INTERVAL: 404 MS
EKG QRS DURATION: 78 MS
EKG QTC CALCULATION (BAZETT): 419 MS
EKG T AXIS: 13 DEGREES
EOSINOPHILS ABSOLUTE: 0.3 K/UL (ref 0–0.6)
EOSINOPHILS RELATIVE PERCENT: 4.2 % (ref 0–5)
GFR AFRICAN AMERICAN: >59
GFR NON-AFRICAN AMERICAN: >60
GLUCOSE BLD-MCNC: 79 MG/DL (ref 74–109)
HCT VFR BLD CALC: 40 % (ref 37–47)
HEMOGLOBIN: 12.8 G/DL (ref 12–16)
IMMATURE GRANULOCYTES #: 0 K/UL
LYMPHOCYTES ABSOLUTE: 1.6 K/UL (ref 1.1–4.5)
LYMPHOCYTES RELATIVE PERCENT: 25.6 % (ref 20–40)
MCH RBC QN AUTO: 31.5 PG (ref 27–31)
MCHC RBC AUTO-ENTMCNC: 32 G/DL (ref 33–37)
MCV RBC AUTO: 98.5 FL (ref 81–99)
MONOCYTES ABSOLUTE: 0.4 K/UL (ref 0–0.9)
MONOCYTES RELATIVE PERCENT: 6.9 % (ref 0–10)
NEUTROPHILS ABSOLUTE: 4 K/UL (ref 1.5–7.5)
NEUTROPHILS RELATIVE PERCENT: 62.3 % (ref 50–65)
PDW BLD-RTO: 13.2 % (ref 11.5–14.5)
PLATELET # BLD: 288 K/UL (ref 130–400)
PMV BLD AUTO: 10.5 FL (ref 9.4–12.3)
POTASSIUM SERPL-SCNC: 4.6 MMOL/L (ref 3.5–5)
RBC # BLD: 4.06 M/UL (ref 4.2–5.4)
SODIUM BLD-SCNC: 143 MMOL/L (ref 136–145)
WBC # BLD: 6.4 K/UL (ref 4.8–10.8)

## 2021-07-26 PROCEDURE — 93005 ELECTROCARDIOGRAM TRACING: CPT | Performed by: SURGERY

## 2021-07-26 PROCEDURE — 85025 COMPLETE CBC W/AUTO DIFF WBC: CPT

## 2021-07-26 PROCEDURE — 80048 BASIC METABOLIC PNL TOTAL CA: CPT

## 2021-07-26 PROCEDURE — 76642 ULTRASOUND BREAST LIMITED: CPT | Performed by: SURGERY

## 2021-07-26 RX ORDER — CETIRIZINE HYDROCHLORIDE 10 MG/1
10 TABLET ORAL DAILY
COMMUNITY
End: 2022-09-14

## 2021-07-26 RX ORDER — CA/D3/MAG OX/ZINC/COP/MANG/BOR 600 MG-800
1 TABLET,CHEWABLE ORAL DAILY
COMMUNITY

## 2021-07-26 RX ORDER — CELECOXIB 200 MG/1
200 CAPSULE ORAL ONCE
Status: CANCELLED | OUTPATIENT
Start: 2021-07-27

## 2021-07-26 RX ORDER — PHENOL 1.4 %
1 AEROSOL, SPRAY (ML) MUCOUS MEMBRANE DAILY
Status: ON HOLD | COMMUNITY
End: 2021-07-28 | Stop reason: HOSPADM

## 2021-07-26 RX ORDER — GABAPENTIN 300 MG/1
300 CAPSULE ORAL ONCE
Status: CANCELLED | OUTPATIENT
Start: 2021-07-27

## 2021-07-26 RX ORDER — ACETAMINOPHEN 325 MG/1
975 TABLET ORAL ONCE
Status: CANCELLED | OUTPATIENT
Start: 2021-07-27

## 2021-07-27 ENCOUNTER — ANESTHESIA (OUTPATIENT)
Dept: OPERATING ROOM | Age: 51
End: 2021-07-27
Payer: COMMERCIAL

## 2021-07-27 ENCOUNTER — ANESTHESIA EVENT (OUTPATIENT)
Dept: OPERATING ROOM | Age: 51
End: 2021-07-27
Payer: COMMERCIAL

## 2021-07-27 ENCOUNTER — HOSPITAL ENCOUNTER (OUTPATIENT)
Age: 51
Setting detail: OBSERVATION
Discharge: HOME | End: 2021-07-28
Attending: SURGERY | Admitting: SURGERY
Payer: COMMERCIAL

## 2021-07-27 ENCOUNTER — HOSPITAL ENCOUNTER (OUTPATIENT)
Dept: ULTRASOUND IMAGING | Age: 51
Discharge: HOME | End: 2021-07-27
Payer: COMMERCIAL

## 2021-07-27 VITALS — OXYGEN SATURATION: 99 % | TEMPERATURE: 98 F | SYSTOLIC BLOOD PRESSURE: 157 MMHG | DIASTOLIC BLOOD PRESSURE: 78 MMHG

## 2021-07-27 DIAGNOSIS — Z90.12 S/P LEFT MASTECTOMY: Primary | ICD-10-CM

## 2021-07-27 DIAGNOSIS — N63.0 BREAST MASS: ICD-10-CM

## 2021-07-27 PROBLEM — D05.12 INTRADUCTAL CARCINOMA IN SITU OF LEFT BREAST: Status: ACTIVE | Noted: 2021-07-27

## 2021-07-27 LAB — HCG(URINE) PREGNANCY TEST: NEGATIVE

## 2021-07-27 PROCEDURE — 84703 CHORIONIC GONADOTROPIN ASSAY: CPT

## 2021-07-27 PROCEDURE — 2500000003 HC RX 250 WO HCPCS: Performed by: ANESTHESIOLOGY

## 2021-07-27 PROCEDURE — 6360000002 HC RX W HCPCS: Performed by: ANESTHESIOLOGY

## 2021-07-27 PROCEDURE — 3700000000 HC ANESTHESIA ATTENDED CARE: Performed by: SURGERY

## 2021-07-27 PROCEDURE — 19303 MAST SIMPLE COMPLETE: CPT | Performed by: PHYSICIAN ASSISTANT

## 2021-07-27 PROCEDURE — 2580000003 HC RX 258: Performed by: SURGERY

## 2021-07-27 PROCEDURE — 6360000002 HC RX W HCPCS: Performed by: SURGERY

## 2021-07-27 PROCEDURE — 6370000000 HC RX 637 (ALT 250 FOR IP): Performed by: ANESTHESIOLOGY

## 2021-07-27 PROCEDURE — C1819 TISSUE LOCALIZATION-EXCISION: HCPCS | Performed by: SURGERY

## 2021-07-27 PROCEDURE — 19285 PERQ DEV BREAST 1ST US IMAG: CPT | Performed by: SURGERY

## 2021-07-27 PROCEDURE — 7100000000 HC PACU RECOVERY - FIRST 15 MIN: Performed by: SURGERY

## 2021-07-27 PROCEDURE — C1713 ANCHOR/SCREW BN/BN,TIS/BN: HCPCS | Performed by: SURGERY

## 2021-07-27 PROCEDURE — 3700000001 HC ADD 15 MINUTES (ANESTHESIA): Performed by: SURGERY

## 2021-07-27 PROCEDURE — 2709999900 HC NON-CHARGEABLE SUPPLY: Performed by: SURGERY

## 2021-07-27 PROCEDURE — 76998 US GUIDE INTRAOP: CPT | Performed by: SURGERY

## 2021-07-27 PROCEDURE — G0378 HOSPITAL OBSERVATION PER HR: HCPCS

## 2021-07-27 PROCEDURE — 88307 TISSUE EXAM BY PATHOLOGIST: CPT

## 2021-07-27 PROCEDURE — 19286 PERQ DEV BREAST ADD US IMAG: CPT | Performed by: SURGERY

## 2021-07-27 PROCEDURE — 88305 TISSUE EXAM BY PATHOLOGIST: CPT

## 2021-07-27 PROCEDURE — 19285 PERQ DEV BREAST 1ST US IMAG: CPT

## 2021-07-27 PROCEDURE — 6360000002 HC RX W HCPCS: Performed by: NURSE ANESTHETIST, CERTIFIED REGISTERED

## 2021-07-27 PROCEDURE — 7100000001 HC PACU RECOVERY - ADDTL 15 MIN: Performed by: SURGERY

## 2021-07-27 PROCEDURE — 2720000010 HC SURG SUPPLY STERILE: Performed by: SURGERY

## 2021-07-27 PROCEDURE — 2500000003 HC RX 250 WO HCPCS: Performed by: NURSE ANESTHETIST, CERTIFIED REGISTERED

## 2021-07-27 PROCEDURE — 19357 TISS XPNDR PLMT BRST RCNSTJ: CPT | Performed by: SURGERY

## 2021-07-27 PROCEDURE — C1789 PROSTHESIS, BREAST, IMP: HCPCS | Performed by: SURGERY

## 2021-07-27 PROCEDURE — 3600000005 HC SURGERY LEVEL 5 BASE: Performed by: SURGERY

## 2021-07-27 PROCEDURE — 19303 MAST SIMPLE COMPLETE: CPT | Performed by: SURGERY

## 2021-07-27 PROCEDURE — 3600000015 HC SURGERY LEVEL 5 ADDTL 15MIN: Performed by: SURGERY

## 2021-07-27 PROCEDURE — 19357 TISS XPNDR PLMT BRST RCNSTJ: CPT | Performed by: PHYSICIAN ASSISTANT

## 2021-07-27 DEVICE — EXPANDER TISS GEL 5.6 CM PROJCT 13X12 CM 400 CC 133S-MV-T: Type: IMPLANTABLE DEVICE | Site: BREAST | Status: FUNCTIONAL

## 2021-07-27 RX ORDER — MORPHINE SULFATE 4 MG/ML
2 INJECTION, SOLUTION INTRAMUSCULAR; INTRAVENOUS
Status: DISCONTINUED | OUTPATIENT
Start: 2021-07-27 | End: 2021-07-28 | Stop reason: HOSPADM

## 2021-07-27 RX ORDER — SODIUM CHLORIDE 9 MG/ML
25 INJECTION, SOLUTION INTRAVENOUS PRN
Status: DISCONTINUED | OUTPATIENT
Start: 2021-07-27 | End: 2021-07-28 | Stop reason: HOSPADM

## 2021-07-27 RX ORDER — ONDANSETRON 2 MG/ML
INJECTION INTRAMUSCULAR; INTRAVENOUS PRN
Status: DISCONTINUED | OUTPATIENT
Start: 2021-07-27 | End: 2021-07-27 | Stop reason: SDUPTHER

## 2021-07-27 RX ORDER — MORPHINE SULFATE 4 MG/ML
4 INJECTION, SOLUTION INTRAMUSCULAR; INTRAVENOUS
Status: DISCONTINUED | OUTPATIENT
Start: 2021-07-27 | End: 2021-07-28 | Stop reason: HOSPADM

## 2021-07-27 RX ORDER — SODIUM CHLORIDE 0.9 % (FLUSH) 0.9 %
5-40 SYRINGE (ML) INJECTION PRN
Status: DISCONTINUED | OUTPATIENT
Start: 2021-07-27 | End: 2021-07-27

## 2021-07-27 RX ORDER — FENTANYL CITRATE 50 UG/ML
50 INJECTION, SOLUTION INTRAMUSCULAR; INTRAVENOUS
Status: DISCONTINUED | OUTPATIENT
Start: 2021-07-27 | End: 2021-07-27

## 2021-07-27 RX ORDER — DEXTROSE, SODIUM CHLORIDE, SODIUM LACTATE, POTASSIUM CHLORIDE, AND CALCIUM CHLORIDE 5; .6; .31; .03; .02 G/100ML; G/100ML; G/100ML; G/100ML; G/100ML
INJECTION, SOLUTION INTRAVENOUS CONTINUOUS
Status: DISCONTINUED | OUTPATIENT
Start: 2021-07-27 | End: 2021-07-27 | Stop reason: ALTCHOICE

## 2021-07-27 RX ORDER — SODIUM CHLORIDE 0.9 % (FLUSH) 0.9 %
5-40 SYRINGE (ML) INJECTION EVERY 12 HOURS SCHEDULED
Status: DISCONTINUED | OUTPATIENT
Start: 2021-07-27 | End: 2021-07-28 | Stop reason: HOSPADM

## 2021-07-27 RX ORDER — SODIUM CHLORIDE 9 MG/ML
25 INJECTION, SOLUTION INTRAVENOUS PRN
Status: DISCONTINUED | OUTPATIENT
Start: 2021-07-27 | End: 2021-07-27

## 2021-07-27 RX ORDER — SODIUM CHLORIDE, SODIUM LACTATE, POTASSIUM CHLORIDE, CALCIUM CHLORIDE 600; 310; 30; 20 MG/100ML; MG/100ML; MG/100ML; MG/100ML
INJECTION, SOLUTION INTRAVENOUS CONTINUOUS
Status: DISCONTINUED | OUTPATIENT
Start: 2021-07-27 | End: 2021-07-27

## 2021-07-27 RX ORDER — SCOLOPAMINE TRANSDERMAL SYSTEM 1 MG/1
1 PATCH, EXTENDED RELEASE TRANSDERMAL
Status: DISCONTINUED | OUTPATIENT
Start: 2021-07-27 | End: 2021-07-27

## 2021-07-27 RX ORDER — SODIUM CHLORIDE 0.9 % (FLUSH) 0.9 %
5-40 SYRINGE (ML) INJECTION PRN
Status: DISCONTINUED | OUTPATIENT
Start: 2021-07-27 | End: 2021-07-28 | Stop reason: HOSPADM

## 2021-07-27 RX ORDER — ONDANSETRON 2 MG/ML
4 INJECTION INTRAMUSCULAR; INTRAVENOUS
Status: DISCONTINUED | OUTPATIENT
Start: 2021-07-27 | End: 2021-07-27

## 2021-07-27 RX ORDER — LIDOCAINE HYDROCHLORIDE 10 MG/ML
INJECTION, SOLUTION INFILTRATION; PERINEURAL PRN
Status: DISCONTINUED | OUTPATIENT
Start: 2021-07-27 | End: 2021-07-27 | Stop reason: SDUPTHER

## 2021-07-27 RX ORDER — PROPOFOL 10 MG/ML
INJECTION, EMULSION INTRAVENOUS CONTINUOUS PRN
Status: DISCONTINUED | OUTPATIENT
Start: 2021-07-27 | End: 2021-07-27 | Stop reason: SDUPTHER

## 2021-07-27 RX ORDER — CELECOXIB 200 MG/1
200 CAPSULE ORAL ONCE
Status: DISCONTINUED | OUTPATIENT
Start: 2021-07-27 | End: 2021-07-27

## 2021-07-27 RX ORDER — APREPITANT 40 MG/1
40 CAPSULE ORAL ONCE
Status: COMPLETED | OUTPATIENT
Start: 2021-07-27 | End: 2021-07-27

## 2021-07-27 RX ORDER — LIDOCAINE HYDROCHLORIDE 10 MG/ML
1 INJECTION, SOLUTION EPIDURAL; INFILTRATION; INTRACAUDAL; PERINEURAL
Status: DISCONTINUED | OUTPATIENT
Start: 2021-07-27 | End: 2021-07-27

## 2021-07-27 RX ORDER — CETIRIZINE HYDROCHLORIDE 10 MG/1
10 TABLET ORAL DAILY
Status: DISCONTINUED | OUTPATIENT
Start: 2021-07-27 | End: 2021-07-28 | Stop reason: HOSPADM

## 2021-07-27 RX ORDER — FENTANYL CITRATE 50 UG/ML
50 INJECTION, SOLUTION INTRAMUSCULAR; INTRAVENOUS EVERY 5 MIN PRN
Status: DISCONTINUED | OUTPATIENT
Start: 2021-07-27 | End: 2021-07-27

## 2021-07-27 RX ORDER — HYDROMORPHONE HYDROCHLORIDE 1 MG/ML
0.25 INJECTION, SOLUTION INTRAMUSCULAR; INTRAVENOUS; SUBCUTANEOUS EVERY 5 MIN PRN
Status: DISCONTINUED | OUTPATIENT
Start: 2021-07-27 | End: 2021-07-27

## 2021-07-27 RX ORDER — FENTANYL CITRATE 50 UG/ML
25 INJECTION, SOLUTION INTRAMUSCULAR; INTRAVENOUS
Status: DISCONTINUED | OUTPATIENT
Start: 2021-07-27 | End: 2021-07-27

## 2021-07-27 RX ORDER — MEPERIDINE HYDROCHLORIDE 50 MG/ML
12.5 INJECTION INTRAMUSCULAR; INTRAVENOUS; SUBCUTANEOUS EVERY 5 MIN PRN
Status: DISCONTINUED | OUTPATIENT
Start: 2021-07-27 | End: 2021-07-27

## 2021-07-27 RX ORDER — FENTANYL CITRATE 50 UG/ML
25 INJECTION, SOLUTION INTRAMUSCULAR; INTRAVENOUS EVERY 5 MIN PRN
Status: DISCONTINUED | OUTPATIENT
Start: 2021-07-27 | End: 2021-07-27

## 2021-07-27 RX ORDER — ENALAPRILAT 2.5 MG/2ML
1.25 INJECTION INTRAVENOUS
Status: DISCONTINUED | OUTPATIENT
Start: 2021-07-27 | End: 2021-07-27

## 2021-07-27 RX ORDER — DIPHENHYDRAMINE HYDROCHLORIDE 50 MG/ML
12.5 INJECTION INTRAMUSCULAR; INTRAVENOUS
Status: DISCONTINUED | OUTPATIENT
Start: 2021-07-27 | End: 2021-07-27

## 2021-07-27 RX ORDER — SODIUM CHLORIDE 9 MG/ML
INJECTION, SOLUTION INTRAVENOUS CONTINUOUS
Status: DISCONTINUED | OUTPATIENT
Start: 2021-07-27 | End: 2021-07-27

## 2021-07-27 RX ORDER — MIDAZOLAM HYDROCHLORIDE 1 MG/ML
2 INJECTION INTRAMUSCULAR; INTRAVENOUS
Status: DISCONTINUED | OUTPATIENT
Start: 2021-07-27 | End: 2021-07-27

## 2021-07-27 RX ORDER — LABETALOL HYDROCHLORIDE 5 MG/ML
5 INJECTION, SOLUTION INTRAVENOUS EVERY 10 MIN PRN
Status: DISCONTINUED | OUTPATIENT
Start: 2021-07-27 | End: 2021-07-27

## 2021-07-27 RX ORDER — ACETAMINOPHEN 325 MG/1
975 TABLET ORAL ONCE
Status: DISCONTINUED | OUTPATIENT
Start: 2021-07-27 | End: 2021-07-27

## 2021-07-27 RX ORDER — HYDRALAZINE HYDROCHLORIDE 20 MG/ML
5 INJECTION INTRAMUSCULAR; INTRAVENOUS EVERY 10 MIN PRN
Status: DISCONTINUED | OUTPATIENT
Start: 2021-07-27 | End: 2021-07-27

## 2021-07-27 RX ORDER — SODIUM CHLORIDE 0.9 % (FLUSH) 0.9 %
5-40 SYRINGE (ML) INJECTION EVERY 12 HOURS SCHEDULED
Status: DISCONTINUED | OUTPATIENT
Start: 2021-07-27 | End: 2021-07-27

## 2021-07-27 RX ORDER — PROCHLORPERAZINE EDISYLATE 5 MG/ML
5 INJECTION INTRAMUSCULAR; INTRAVENOUS
Status: DISCONTINUED | OUTPATIENT
Start: 2021-07-27 | End: 2021-07-27

## 2021-07-27 RX ORDER — GABAPENTIN 300 MG/1
300 CAPSULE ORAL ONCE
Status: DISCONTINUED | OUTPATIENT
Start: 2021-07-27 | End: 2021-07-27

## 2021-07-27 RX ORDER — FENTANYL CITRATE 50 UG/ML
INJECTION, SOLUTION INTRAMUSCULAR; INTRAVENOUS PRN
Status: DISCONTINUED | OUTPATIENT
Start: 2021-07-27 | End: 2021-07-27 | Stop reason: SDUPTHER

## 2021-07-27 RX ORDER — HYDROMORPHONE HYDROCHLORIDE 1 MG/ML
0.5 INJECTION, SOLUTION INTRAMUSCULAR; INTRAVENOUS; SUBCUTANEOUS EVERY 5 MIN PRN
Status: COMPLETED | OUTPATIENT
Start: 2021-07-27 | End: 2021-07-27

## 2021-07-27 RX ORDER — ONDANSETRON 2 MG/ML
4 INJECTION INTRAMUSCULAR; INTRAVENOUS EVERY 6 HOURS PRN
Status: DISCONTINUED | OUTPATIENT
Start: 2021-07-27 | End: 2021-07-28 | Stop reason: HOSPADM

## 2021-07-27 RX ORDER — LISINOPRIL 2.5 MG/1
2.5 TABLET ORAL DAILY
Status: DISCONTINUED | OUTPATIENT
Start: 2021-07-27 | End: 2021-07-28 | Stop reason: HOSPADM

## 2021-07-27 RX ADMIN — ONDANSETRON HYDROCHLORIDE 4 MG: 2 INJECTION, SOLUTION INTRAMUSCULAR; INTRAVENOUS at 11:20

## 2021-07-27 RX ADMIN — FENTANYL CITRATE 25 MCG: 50 INJECTION, SOLUTION INTRAMUSCULAR; INTRAVENOUS at 09:26

## 2021-07-27 RX ADMIN — FENTANYL CITRATE 25 MCG: 50 INJECTION, SOLUTION INTRAMUSCULAR; INTRAVENOUS at 10:34

## 2021-07-27 RX ADMIN — FENTANYL CITRATE 25 MCG: 50 INJECTION, SOLUTION INTRAMUSCULAR; INTRAVENOUS at 10:12

## 2021-07-27 RX ADMIN — HYDROMORPHONE HYDROCHLORIDE 0.5 MG: 1 INJECTION, SOLUTION INTRAMUSCULAR; INTRAVENOUS; SUBCUTANEOUS at 12:53

## 2021-07-27 RX ADMIN — Medication 4 MG: at 20:21

## 2021-07-27 RX ADMIN — Medication 4 MG: at 15:56

## 2021-07-27 RX ADMIN — Medication 2000 MG: at 17:30

## 2021-07-27 RX ADMIN — FAMOTIDINE 20 MG: 10 INJECTION, SOLUTION INTRAVENOUS at 09:12

## 2021-07-27 RX ADMIN — HYDROMORPHONE HYDROCHLORIDE 0.5 MG: 1 INJECTION, SOLUTION INTRAMUSCULAR; INTRAVENOUS; SUBCUTANEOUS at 12:17

## 2021-07-27 RX ADMIN — FENTANYL CITRATE 25 MCG: 50 INJECTION, SOLUTION INTRAMUSCULAR; INTRAVENOUS at 09:48

## 2021-07-27 RX ADMIN — HYDROMORPHONE HYDROCHLORIDE 0.5 MG: 1 INJECTION, SOLUTION INTRAMUSCULAR; INTRAVENOUS; SUBCUTANEOUS at 12:06

## 2021-07-27 RX ADMIN — APREPITANT 40 MG: 40 CAPSULE ORAL at 09:12

## 2021-07-27 RX ADMIN — LIDOCAINE HYDROCHLORIDE 50 MG: 10 INJECTION, SOLUTION INFILTRATION; PERINEURAL at 09:24

## 2021-07-27 RX ADMIN — PROPOFOL 100 MCG/KG/MIN: 10 INJECTION, EMULSION INTRAVENOUS at 09:24

## 2021-07-27 RX ADMIN — HYDROMORPHONE HYDROCHLORIDE 0.5 MG: 1 INJECTION, SOLUTION INTRAMUSCULAR; INTRAVENOUS; SUBCUTANEOUS at 12:48

## 2021-07-27 ASSESSMENT — PAIN SCALES - GENERAL
PAINLEVEL_OUTOF10: 8
PAINLEVEL_OUTOF10: 8
PAINLEVEL_OUTOF10: 7
PAINLEVEL_OUTOF10: 8
PAINLEVEL_OUTOF10: 7

## 2021-07-27 ASSESSMENT — LIFESTYLE VARIABLES: SMOKING_STATUS: 0

## 2021-07-27 ASSESSMENT — ENCOUNTER SYMPTOMS: SHORTNESS OF BREATH: 0

## 2021-07-27 NOTE — H&P
HISTORY OF PRESENT ILLNESS:     Ms. Louann Hart presents today for a follow up from seeing Dr. Trinidad Guo to discuss surgery. She is wanting a unilateral left mammogram with tissue expander placement at that time. This will be followed in 3 or 4 weeks by a ROSY flap autologous microvascular reconstruction in [place]     She is recently status post ultrasound guided breast biopsy  on the left which revealed a tiny micropapillary ductal   carcinoma in situ. ER is positive at 94%.      MRI-5/26/2021  FINDINGS:  There is a large 8.5 x 3.3 x 5.4 cm area of non-mass enhancement in   the LEFT upper outer breast, located along the lateral and inferior   aspect of the biopsy clip marker. No focal suspicious solid mass. No   mass adjacent to the biopsy clip marker. No abnormal LEFT breast skin   thickening or nipple enhancement. No abnormal enhancement or suspicious mass in the RIGHT breast. No   abnormal RIGHT breast skin thickening or nipple enhancement. No enlarged axillary or internal mammary lymph nodes. Limited   evaluation of the anterior chest and upper abdomen is unremarkable. No   suspicious osseous lesions.       Impression   Impression:   1.  Large 8.5 x 3.3 x 5.4 cm area of regional non-mass enhancement in   the LEFT upper outer breast, with differential diagnosis including   DCIS. 2.  No suspicious focal mass in either breast. No focal mass adjacent   to the LEFT breast biopsy clip marker. 3.  No axillary or internal mammary lymphadenopathy. BI-RADS CATEGORY 6: KNOWN BIOPSY WITH PROVEN MALIGNANCY. Signed by Dr Carol Sidhu on 5/26/2021 8:20 AM      Review of her MRI and her mammogram demonstrated an area of significant concern which is distinctly separate from the site of her initial biopsy. 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   This area however spreads over an 8 cm portion of her breast     José Benítez is a 54 y.o. female with the following history as recorded in [facility]:  Patient Active Problem List    Diagnosis Date Noted    Ductal carcinoma in situ (DCIS) of left breast 2021    Atypical ductal hyperplasia of breast 2021    Lump or mass in breast 2021     Current Facility-Administered Medications   Medication Dose Route Frequency Provider Last Rate Last Admin    acetaminophen (TYLENOL) tablet 975 mg  975 mg Oral Once Nicholas Llanes MD        celecoxib (CELEBREX) capsule 200 mg  200 mg Oral Once Nicholas Llanes MD        gabapentin (NEURONTIN) capsule 300 mg  300 mg Oral Once Nicholas Llanes MD         Allergies: Patient has no known allergies. Past Medical History:   Diagnosis Date    Cancer [facility])     DCIS     delivery delivered     Ductal carcinoma in situ (DCIS) of left breast 2021    History of D&C     1993    Hypertension      Past Surgical History:   Procedure Laterality Date     SECTION      DILATION AND CURETTAGE OF UTERUS      X2    US BREAST NEEDLE BIOPSY LEFT Left 2021    US BREAST NEEDLE BIOPSY LEFT 2021 [facility]     Family History   Problem Relation Age of Onset    Hypertension Mother     No Known Problems Father     Thyroid Disease Sister     Cancer Maternal Great Grandmother         Unknown     Social History     Tobacco Use    Smoking status: Never Smoker    Smokeless tobacco: Never Used   Substance Use Topics    Alcohol use: Never       ROS:  14 point review of systems is negative except for the above. PHYSICAL EXAM:    The patient is a 54 y.o. female  in no acute distress. She is alert oriented and cooperative. Mood and affect are appropriate. Skin is warm and dry without rashes. There were no vitals taken for this visit. HEENT: Normocephalic and atraumatic. EOMs intact. Pupils equal and round and reactive to light and accommodation.   External ears and nose are normal.  Sclera nonicteric. Conjunctiva normal  Oropharynx without masses or lesions. Neck: Neck is supple without masses or thyromegaly    Chest: Lungs are clear to auscultation. Respiratory effort normal    Cardiac: Regular rate and rhythm without rubs, murmurs, or gallops    Breasts: The breasts are symmetrical. There are fibrocystic changes throughout both breasts. There are no dominant masses, no skin or nipple changes, and no axillary adenopathy. Abdomen: The abdomen is soft and nontender with no hepatosplenomegaly. There are no abdominal hernias noted. Extremities: The extremities are normal. There are no signs of clubbing, cyanosis, or edema. IMPRESSION:extensive DCIS left breast    PLAN:unilateral left nipple sparing mastectomy with immediate tissue expander placement    DISCUSSION:  I had a lengthy discussion with Ms. Swapna Farah  and her family about the ramifications of the diagnosis of breast cancer. We discussed the pathophysiology of cancer in general and also the ways in which surgery, radiation therapy, and chemotherapy are utilized in the treatment of different types of cancers. We also explained how these modalities related to her situation in particular. We discussed the pathophysiology of breast cancer and some length, including what is known about the causes of breast cancer, its relationship to fibrocystic disease, its relationship to hormone replacement therapy, and some of the genetic aspects involved in familial breast cancers. We discussed the BrCa genetic analysis and why it is appropriate for her. We discussed breast MRI and how it assists in evaluation of breast cancers and the results of her MRI if done.       We discussed the surgical options including simple mastectomy and lumpectomy with  sentinel lymph node biopsy as well as the possibility of axillary lymph node dissection.   We explained in depth why breast conservation therapy requires radiation treatments for the majority of women. These treatments may be external beam for 6 weeks, partial breast for 5 days,  or intraoperative. I explained that most women treated for invasive malignancy do receive systemic therapy, hormonal therapy or  chemotherapy postoperatively depending upon the final pathology, the lymph node status, and the hormone receptor status. I discussed Oncotype Dx, Mammoprint, and Adjuvant Online as tools which aid in the decision for chemotherapy or hormonal therapy. We also discussed the possibility of breast reconstruction if a mastectomy was required. .  I explained to her the different techniques including placement of a subpectoral implant with a Alloderm sling  versus TRAM flap reconstruction as welll as other methods of reconstruction. She does wish to pursue reconstruction at this time.         After a prolonged discussion lasting 30 minutes  we felt it was most appropriate that she start Singulair and letrozole today. She wants surgery on July 27.   We will plan a unilateral nipple sparing mastectomy with Wise pattern type incision and tissue expander placement

## 2021-07-27 NOTE — ANESTHESIA POSTPROCEDURE EVALUATION
Department of Anesthesiology  Postprocedure Note    Patient: Quin Graham  MRN: [id number]  [place]: 1970  Date of evaluation: 7/27/2021  Time:  11:53 AM     Procedure Summary     Date: 07/27/21 Room / Location: [address]    Anesthesia Start: 2088 Anesthesia Stop: 6141    Procedure: LEFT NIPPLE SPARING MASTECTOMY VIA WISE PATTERN, TISSUE EXPANDER RECONSTRUCTION, PEC BLOCK (Left Breast) Diagnosis: (O14.46)    Surgeons: Thang Mckeon MD Responsible Provider: ENRIQUE Johnston CRNA    Anesthesia Type: MAC ASA Status: 2          Anesthesia Type: MAC    Rasta Phase I: Rasta Score: 10    Rasta Phase II:      Last vitals: Reviewed and per EMR flowsheets.        Anesthesia Post Evaluation    Patient location during evaluation: PACU  Patient participation: complete - patient participated  Level of consciousness: awake and alert  Pain score: 0  Airway patency: patent  Nausea & Vomiting: no nausea and no vomiting  Complications: no  Cardiovascular status: blood pressure returned to baseline  Respiratory status: acceptable, spontaneous ventilation and room air  Hydration status: stable

## 2021-07-27 NOTE — ANESTHESIA PRE PROCEDURE
Department of Anesthesiology  Preprocedure Note       Name:  Juan Manuel Ozuna   Age:  54 y.o.  :  1970                                          MRN:  [id number]         Date:  2021      Surgeon: Brian Blood):  Kinga Brumfield MD    Procedure: Procedure(s):  LEFT NIPPLE SPARING MASTECTOMY VIA WISE PATTERN, TISSUE EXPANDER RECONSTRUCTION, PEC BLOCK    Medications prior to admission:   Prior to Admission medications    Medication Sig Start Date End Date Taking?  Authorizing Provider   Multiple Vitamins-Minerals (MULTIVITAMIN WOMEN) TABS Take 1 tablet by mouth daily    Historical Provider, MD   cetirizine (ZYRTEC) 10 MG tablet Take 10 mg by mouth daily    Historical Provider, MD   Probiotic Product (PROBIOTIC ADVANCED) CAPS Take 1 capsule by mouth daily    Historical Provider, MD   mupirocin (BACTROBAN) 2 % ointment Apply to each nostril BID 5 days prior to surgery 21   Kinga Brumfield MD   tamoxifen (NOLVADEX) 20 MG tablet Take 1 tablet by mouth daily 21   Kinga Brumfield MD   lisinopril (PRINIVIL;ZESTRIL) 5 MG tablet Take 0.5 tablets by mouth daily 21   ENRIQUE Schmidt CNP   vitamin D (ERGOCALCIFEROL) 1.25 MG (01881 UT) CAPS capsule Take 1 capsule by mouth once a week 21   ENRIQUE Schmidt CNP       Current medications:    Current Facility-Administered Medications   Medication Dose Route Frequency Provider Last Rate Last Admin    acetaminophen (TYLENOL) tablet 975 mg  975 mg Oral Once Kinga Brumfield MD        celecoxib (CELEBREX) capsule 200 mg  200 mg Oral Once Kinga Brumfield MD        gabapentin (NEURONTIN) capsule 300 mg  300 mg Oral Once Kinga Brumfield MD        ceFAZolin (ANCEF) 2000 mg in 0.9% sodium chloride 50 mL IVPB  2,000 mg Intravenous On Call to [id number] [address], MD           Allergies:  No Known Allergies    Problem List:    Patient Active Problem List   Diagnosis Code    Atypical ductal hyperplasia of breast N60.99    Lump or mass in breast N63.0    Ductal carcinoma in situ (DCIS) of left breast D05.12       Past Medical History:        Diagnosis Date    Cancer [facility])     DCIS     delivery delivered     Ductal carcinoma in situ (DCIS) of left breast 2021    History of D&C     1993    Hypertension        Past Surgical History:        Procedure Laterality Date     SECTION      DILATION AND CURETTAGE OF UTERUS      X2    US BREAST NEEDLE BIOPSY LEFT Left 2021    US BREAST NEEDLE BIOPSY LEFT 2021 [facility]       Social History:    Social History     Tobacco Use    Smoking status: Never Smoker    Smokeless tobacco: Never Used   Substance Use Topics    Alcohol use: Never                                Counseling given: Not Answered      Vital Signs (Current): There were no vitals filed for this visit.                                            BP Readings from Last 3 Encounters:   21 110/72   21 127/86   21 114/72       NPO Status:                                                                                 BMI:   Wt Readings from Last 3 Encounters:   21 179 lb (81.2 kg)   21 178 lb (80.7 kg)   21 187 lb (84.8 kg)     There is no height or weight on file to calculate BMI.    CBC:   Lab Results   Component Value Date    WBC 6.4 2021    RBC 4.06 2021    HGB 12.8 2021    HCT 40.0 2021    MCV 98.5 2021    RDW 13.2 2021     2021       CMP:   Lab Results   Component Value Date     2021    K 4.6 2021     2021    CO2 28 2021    BUN 14 2021    CREATININE 0.7 2021    GFRAA >59 2021    LABGLOM >60 2021    GLUCOSE 79 2021    PROT 8.2 2021    CALCIUM 9.4 2021    BILITOT 0.4 2021    ALKPHOS 122 2021    AST 28 2021    ALT 27 2021       POC Tests: No results for input(s): POCGLU, POCNA, POCK, POCCL, POCBUN, POCHEMO, POCHCT in the last 72 hours. Coags: No results found for: PROTIME, INR, APTT    HCG (If Applicable): No results found for: PREGTESTUR, PREGSERUM, HCG, HCGQUANT     ABGs: No results found for: PHART, PO2ART, RTV7EHJ, AHO7ZNG, BEART, [id number]     Type & Screen (If Applicable):  No results found for: LABABO, LABRH    Drug/Infectious Status (If Applicable):  No results found for: HIV, HEPCAB    COVID-19 Screening (If Applicable): No results found for: COVID19        Anesthesia Evaluation  Patient summary reviewed and Nursing notes reviewed no history of anesthetic complications:   Airway: Mallampati: I  TM distance: >3 FB   Neck ROM: full  Mouth opening: > = 3 FB Dental: normal exam         Pulmonary:       (-) shortness of breath, sleep apnea and not a current smoker                           Cardiovascular:  Exercise tolerance: good (>4 METS),   (+) hypertension:,     (-) pacemaker, past MI, CAD, CABG/stent, dysrhythmias,  angina and no hyperlipidemia    ECG reviewed               Beta Blocker:  Not on Beta Blocker         Neuro/Psych:      (-) seizures and CVA           GI/Hepatic/Renal:        (-) GERD, liver disease and no renal disease       Endo/Other:    (+) malignancy/cancer (left breast cancer). (-) diabetes mellitus, blood dyscrasia               Abdominal:             Vascular:     - DVT and PE. Other Findings:             Anesthesia Plan      MAC     ASA 2     (Scop, emend, and pepcid.)  Induction: intravenous. MIPS: Postoperative opioids intended and Prophylactic antiemetics administered. Anesthetic plan and risks discussed with patient.                       Alf Iraheta DO   7/27/2021

## 2021-07-27 NOTE — BRIEF OP NOTE
Brief Postoperative Note      DATE OF PROCEDURE: 7/27/2021     SURGEON: Anant Neff MD    PREOPERATIVE DIAGNOSIS:  D05.12    POSTOPERATIVE DIAGNOSIS: Same     OPERATION: Procedure(s):  LEFT NIPPLE SPARING MASTECTOMY VIA WISE PATTERN, TISSUE EXPANDER RECONSTRUCTION, PEC BLOCK  ULTRASOUND-guided needle localization x2, margin probe  ANESTHESIA: MAC with pec block    ESTIMATED BLOOD LOSS: Minimal    COMPLICATIONS: None. SPECIMENS:   ID Type Source Tests Collected by Time Destination   A : left breast tissue **SHORT AT NIPPLE; LONG AT AXILLA** Tissue Breast SURGICAL PATHOLOGY Anant Neff MD 7/27/2021 2069    B : left nipple  Tissue Breast SURGICAL PATHOLOGY Anant Neff MD 7/27/2021 1011    C : left breast tissue additional anterior margin Tissue Breast Kalin Hidalgo 9038, MD 7/27/2021 1043        DRAINS: 2 JPs    The patient tolerated the procedure well.     Electronically signed by Anant Neff MD  on 7/27/2021 at 11:58 AM

## 2021-07-28 ENCOUNTER — TELEPHONE (OUTPATIENT)
Dept: PRIMARY CARE CLINIC | Age: 51
End: 2021-07-28

## 2021-07-28 VITALS
SYSTOLIC BLOOD PRESSURE: 119 MMHG | TEMPERATURE: 96.9 F | RESPIRATION RATE: 18 BRPM | DIASTOLIC BLOOD PRESSURE: 63 MMHG | BODY MASS INDEX: 29.82 KG/M2 | WEIGHT: 179 LBS | HEART RATE: 67 BPM | HEIGHT: 65 IN | OXYGEN SATURATION: 98 %

## 2021-07-28 PROCEDURE — 6360000002 HC RX W HCPCS: Performed by: SURGERY

## 2021-07-28 PROCEDURE — 2580000003 HC RX 258: Performed by: SURGERY

## 2021-07-28 PROCEDURE — 6370000000 HC RX 637 (ALT 250 FOR IP): Performed by: SURGERY

## 2021-07-28 PROCEDURE — 96374 THER/PROPH/DIAG INJ IV PUSH: CPT

## 2021-07-28 PROCEDURE — 96376 TX/PRO/DX INJ SAME DRUG ADON: CPT

## 2021-07-28 PROCEDURE — G0378 HOSPITAL OBSERVATION PER HR: HCPCS

## 2021-07-28 PROCEDURE — 96372 THER/PROPH/DIAG INJ SC/IM: CPT

## 2021-07-28 PROCEDURE — 99024 POSTOP FOLLOW-UP VISIT: CPT | Performed by: SURGERY

## 2021-07-28 RX ORDER — HYDROCODONE BITARTRATE AND ACETAMINOPHEN 5; 325 MG/1; MG/1
2 TABLET ORAL EVERY 4 HOURS PRN
Status: DISCONTINUED | OUTPATIENT
Start: 2021-07-28 | End: 2021-07-28 | Stop reason: HOSPADM

## 2021-07-28 RX ORDER — HYDROCODONE BITARTRATE AND ACETAMINOPHEN 5; 325 MG/1; MG/1
1 TABLET ORAL EVERY 6 HOURS PRN
Qty: 12 TABLET | Refills: 0 | Status: SHIPPED | OUTPATIENT
Start: 2021-07-28 | End: 2021-08-31 | Stop reason: ALTCHOICE

## 2021-07-28 RX ORDER — ERGOCALCIFEROL (VITAMIN D2) 1250 MCG
CAPSULE ORAL
Status: ON HOLD | COMMUNITY
Start: 2021-06-24 | End: 2021-07-28

## 2021-07-28 RX ORDER — HYDROCODONE BITARTRATE AND ACETAMINOPHEN 5; 325 MG/1; MG/1
1 TABLET ORAL EVERY 4 HOURS PRN
Status: DISCONTINUED | OUTPATIENT
Start: 2021-07-28 | End: 2021-07-28 | Stop reason: HOSPADM

## 2021-07-28 RX ADMIN — LISINOPRIL 2.5 MG: 2.5 TABLET ORAL at 09:10

## 2021-07-28 RX ADMIN — Medication 10 ML: at 10:41

## 2021-07-28 RX ADMIN — Medication 2 MG: at 01:55

## 2021-07-28 RX ADMIN — Medication 2 MG: at 06:44

## 2021-07-28 RX ADMIN — Medication 2000 MG: at 01:26

## 2021-07-28 RX ADMIN — CETIRIZINE HYDROCHLORIDE 10 MG: 10 TABLET, FILM COATED ORAL at 09:10

## 2021-07-28 RX ADMIN — HYDROCODONE BITARTRATE AND ACETAMINOPHEN 1 TABLET: 5; 325 TABLET ORAL at 09:58

## 2021-07-28 RX ADMIN — ENOXAPARIN SODIUM 40 MG: 40 INJECTION SUBCUTANEOUS at 09:09

## 2021-07-28 ASSESSMENT — PAIN SCALES - GENERAL
PAINLEVEL_OUTOF10: 4
PAINLEVEL_OUTOF10: 4
PAINLEVEL_OUTOF10: 5

## 2021-07-28 ASSESSMENT — PAIN DESCRIPTION - PAIN TYPE: TYPE: SURGICAL PAIN

## 2021-07-28 ASSESSMENT — PAIN DESCRIPTION - LOCATION: LOCATION: BREAST

## 2021-07-28 ASSESSMENT — PAIN DESCRIPTION - ORIENTATION: ORIENTATION: LEFT

## 2021-07-28 NOTE — TELEPHONE ENCOUNTER
Cm 45 Transitions Initial Follow Up Call    Outreach made within 2 business days of discharge: Yes    Patient: Warren Morel Patient : 1970   MRN: [id number]  Reason for Admission: Left breast mastectomy    Discharge Date: 21       Spoke with: Patient    Discharge department/facility: [address] [place] [address] Interactive Patient Contact:  Was patient able to fill all prescriptions: Yes  Was patient instructed to bring all medications to the follow-up visit: No: Patient was not told to bring meds to appointment  Is patient taking all medications as directed in the discharge summary?  Yes  Does patient understand their discharge instructions: Yes  Does patient have questions or concerns that need addressed prior to 7-14 day follow up office visit: no    Scheduled appointment with PCP within 7-14 days    Follow Up  Future Appointments   Date Time Provider Jami Wilkinson   2021  1:15 PM Vickie Songster, MD N [facility]   2021  3:30 PM Carmen Casino, APRN - CNP [facility] [facility]   10/26/2021 11:30 AM ENRIQUE Srivastava CNP 3.

## 2021-07-28 NOTE — PROGRESS NOTES
Aldair Mason MD M.D. FACS  Daily Progress Note    Pt Name: Arianna Cutler  Medical Record Number: [id number]  Date of Birth 1970   Today's Date: 7/28/2021    Chief Complaint:  No chief complaint on file. SUBJECTIVE:     Ruth Mcdonnell is doing well. Pain is well controlled.      MEDS:     Scheduled Meds:   lisinopril  2.5 mg Oral Daily    cetirizine  10 mg Oral Daily    sodium chloride flush  5-40 mL Intravenous 2 times per day    enoxaparin  40 mg Subcutaneous Daily     Continuous Infusions:   sodium chloride       PRN Meds:sodium chloride flush, 5-40 mL, PRN  sodium chloride, 25 mL, PRN  morphine, 2 mg, Q2H PRN   Or  morphine, 4 mg, Q2H PRN  ondansetron, 4 mg, Q6H PRN        OBJECTIVE:     Patient Vitals for the past 24 hrs:   BP Temp Temp src Pulse Resp SpO2   07/28/21 0704 99/68 97.3 °F (36.3 °C) Temporal 65 16 99 %   07/27/21 2315 128/84 97.8 °F (36.6 °C) Temporal 72 16 98 %   07/27/21 1923 131/88 97.3 °F (36.3 °C) Temporal 70 16 98 %   07/27/21 1757 120/85 96.7 °F (35.9 °C) -- 64 16 100 %   07/27/21 1547 128/82 97.4 °F (36.3 °C) -- 67 16 99 %   07/27/21 1444 118/78 97 °F (36.1 °C) -- 61 14 97 %   07/27/21 1419 115/87 96.5 °F (35.8 °C) -- 70 14 99 %   07/27/21 1345 (!) 136/95 96.8 °F (36 °C) Temporal 67 14 95 %   07/27/21 1313 (!) 107/93 97.2 °F (36.2 °C) -- 53 10 97 %   07/27/21 1308 128/81 -- -- 64 15 97 %   07/27/21 1305 -- -- -- 60 -- --   07/27/21 1303 115/79 -- -- 69 15 98 %   07/27/21 1258 114/73 -- -- 79 13 94 %   07/27/21 1253 117/82 -- -- 61 21 95 %   07/27/21 1250 -- -- -- 56 -- --   07/27/21 1248 130/84 -- -- 55 11 93 %   07/27/21 1243 117/85 -- -- 57 14 97 %   07/27/21 1238 108/79 -- -- (!) 48 11 96 %   07/27/21 1235 -- -- -- 54 -- --   07/27/21 1233 113/82 -- -- 58 14 96 %   07/27/21 1220 -- -- -- 52 -- --   07/27/21 1213 110/75 -- -- 62 8 95 %   07/27/21 1203 128/81 -- -- 56 18 95 %   07/27/21 1158 119/78 -- -- 58 16 94 %   07/27/21 1153 106/78 96.8 °F (36 °C) Temporal 59 16 94 %   07/27/21 1151 -- 96.8 °F (36 °C) -- -- -- --         Intake/Output Summary (Last 24 hours) at 7/28/2021 0845  Last data filed at 7/28/2021 0127  Gross per 24 hour   Intake --   Output 290 ml   Net -290 ml       In: -   Out: 240 [Drains:240]    I/O last 3 completed shifts:  In: -   Out: 290 [Drains:240; Blood:50]     Date 07/28/21 0000 - 07/28/21 2359   Shift 1967-1700 2501-5172 8710-9910 24 Hour Total   INTAKE   Shift Total(mL/kg)       OUTPUT   Drains(mL/kg) 90(1.1)   90(1.1)   Shift Total(mL/kg) 90(1.1)   90(1.1)   Weight (kg) 81.2 81.2 81.2 81.2       Wt Readings from Last 3 Encounters:   07/27/21 179 lb (81.2 kg)   07/26/21 179 lb (81.2 kg)   05/11/21 178 lb (80.7 kg)        Body mass index is 29.79 kg/m². Diet: ADULT DIET; Regular    LABS:     CBC:   Recent Labs     07/26/21  1050   WBC 6.4   RBC 4.06*   HGB 12.8   HCT 40.0   MCV 98.5   MCH 31.5*   MCHC 32.0*   RDW 13.2      MPV 10.5      Last 3 CMP:   Recent Labs     07/26/21  1050      K 4.6      CO2 28   BUN 14   CREATININE 0.7   GLUCOSE 79   CALCIUM 9.4          PHYSICAL EXAM:     CONSTITUTIONAL: Alert, appropriate, no acute distress  SKIN: warm, dry with no rashes or lesions  EYES: Non icteric  CHEST/LUNGS: CTA bilaterally  CARDIOVASCULAR: RRR    ABDOMEN: soft, ND, appropriately TTP, +BS  Incisions: Clean, dry, and intact with no erythema or induration  NEUROLOGIC: CN II-XI grossly intact, no motor or sensory deficits   EXTREMITIES: warm, well perfused, no edema     ASSESSMENT:       1. HD # 0  Patient Active Problem List   Diagnosis    Atypical ductal hyperplasia of breast    Lump or mass in breast    Ductal carcinoma in situ (DCIS) of left breast    S/P left mastectomy    Intraductal carcinoma in situ of left breast   2. PLAN:     1. DC home follow-up in 1 week  2. Thang Mckeon MD, MMIKE.  FACS  Electronically signed 7/28/2021 at 8:45 AM

## 2021-07-28 NOTE — OP NOTE
[facility]SE [facility] VERONICA [address], [address] [facility]                                OPERATIVE REPORT    PATIENT NAME: Kade Cormier              :        1970  MED REC NO:   [id number]                              ROOM:       [facility]  ACCOUNT NO:   [de-identified]                           ADMIT DATE: 2021  PROVIDER:     Navi Méndez MD    DATE OF PROCEDURE:  2021    PREOPERATIVE DIAGNOSIS:  Extensive multifocal DCIS, left breast.    POSTOPERATIVE DIAGNOSIS:  Extensive multifocal DCIS, left breast.    PROCEDURES:  1. Ultrasound-guided needle localization x2.  2.  Placement of two separate needles. 3.  Left pectoral block. 4.  Left nipple-sparing mastectomy. 5.  Utilization of MarginProbe intraoperative margin assessment device. 6.  Reconstruction of left mastectomy with a 13 cm tissue expander. SURGEON:  Navi Méndez MD    ASSISTANT:  Dorita Agarwal PA-C. He was present for all portions of  the case including all critical portions as well as closure. ANESTHESIA:  Pec block with MAC. INDICATIONS:  The patient is a 59-year-old woman who had some  microcalcifications on biopsy by Radiology which demonstrated some  atypia, possible DCIS. She then underwent MRI which demonstrated an 8  cm area of enhancement in the lateral left breast.  Biopsy of this  demonstrated DCIS and appeared to extend over an area of 8 to 10 cm in  length. We discussed the risks and benefits of bilateral mastectomy,  reconstruction, etc.  After multiple discussions and discussions with  Plastic Surgery in [place], she has elected to proceed with a left  mastectomy with expander followed by deep flap reconstruction in the  next several weeks in [place]. We discussed all this in great length. She understands and is agreeable.     OPERATIVE PROCEDURE:  Today, she was brought to the operating room, was  adequately sedated and then using ultrasound, we were able to identify  the two previously placed clips. These were potentially very difficult  to find in the breast and we were localizing for the purposes of  pathologic exam as well as margin assessment. Using ultrasound, we were  able to identify the two separate clips and inserted 5 cm Kopans wires  into both of them without difficulty. We then proceeded with our left  pectoral block. We utilized a solution of 50 mL of 0.2% ropivacaine, 8  mg of Decadron, and 50 mL of normal saline. We injected the intercostal  perforators at the lateral sternal border and then injected the  inframammary crease in the infraclavicular area, then laterally injected  the interpectoral spaces as well as the lateral intercostal perforators  again with ultrasound guidance. We then re-prepped and re-draped,  proceeded with a Krause-pattern incision with a small area of  de-epithelialization inferiorly and then a triangle up to the nipple  that we de-epithelialized. Once this was done, we opened along the  inferior aspect and an inframammary incision with about a centimeter  half of de-epithelialized tissue inferiorly. We then used the Bovie  electrocautery, the facelift scissors and the EnSeal device to dissect  the skin and subcutaneous off of the breast parenchyma. This went quite  nicely in all directions. We took care not to hit the previously placed  wires. We did identify wires, pulled them through and then continued  our resection superiorly and laterally. We then repeated this  inferiorly and then swept the breast off the chest wall using Bovie  electrocautery. We obtained good hemostasis. We took an extra specimen  at the underside of the nipple for permanent sections. We also placed a  short stitch in the nipple and a long silk stitch in the left axilla.    We then dried our specimen carefully during ultrasound which appeared to  show a centimeter half margins on the medial lesion and about a  centimeter on the more

## 2021-08-02 NOTE — DISCHARGE SUMMARY
Physician Discharge Summary         Patient ID:  Jennifer Ashraf  [id number]  25 y.o. Admit date: 7/27/2021    Discharge date and time: 7/28/2021 12:05 PM     Admitting Physician: Chelo Gillis MD     Admission Diagnoses: S/P left mastectomy [Z90.12]  Intraductal carcinoma in situ of left breast [D05.12]    Discharge Diagnoses:     A.  Breast, left skin and nipple sparing mastectomy:   1.  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 2.  In situ carcinoma demonstrates extensive intraluminal mucin   production. 3.  In situ carcinoma measures at least 8.0 cm in greatest dimension. 4.  In situ carcinoma extends to within 0.1 cm of the deep surgical   excision margin and 0.4 cm of the anterior margin. 5.  Negative for invasive carcinoma. B.  Breast, excision of additional breast tissue from beneath nipple:   Benign breast parenchyma. C.  Breast, excision of additional left breast anterior margin: Benign breast parenchyma. Patient Active Problem List   Diagnosis    Atypical ductal hyperplasia of breast    Lump or mass in breast    Ductal carcinoma in situ (DCIS) of left breast    S/P left mastectomy    Intraductal carcinoma in situ of left breast       Procedure:  LEFT NIPPLE SPARING MASTECTOMY VIA WISE PATTERN, TISSUE EXPANDER RECONSTRUCTION, PEC BLOCK (Left Breast)    Discharged Condition: good    Hospital Course:   Please see hospital chart    Consults: none    Disposition: home    Patient Instructions: Activity and wound care per mastectomy instruction sheet  Diet: regular diet       Medication List      START taking these medications    HYDROcodone-acetaminophen 5-325 MG per tablet  Commonly known as: Norco  Take 1 tablet by mouth every 6 hours as needed for Pain.         CONTINUE taking these medications    cetirizine 10 MG tablet  Commonly known as: ZYRTEC     lisinopril 5 MG tablet  Commonly known as: PRINIVIL;ZESTRIL  Take 0.5 tablets by mouth daily     mupirocin 2 % ointment  Commonly known as: Bactroban  Apply to each nostril BID 5 days prior to surgery     Probiotic Advanced Caps     tamoxifen 20 MG tablet  Commonly known as: NOLVADEX  Take 1 tablet by mouth daily        STOP taking these medications    CENTRUM SILVER 50+WOMEN PO     ergocalciferol 1.25 MG (26609 UT) capsule  Commonly known as: ERGOCALCIFEROL     Multivitamin Women Tabs     vitamin D 1.25 MG (60908 UT) Caps capsule  Commonly known as: ERGOCALCIFEROL           Where to Get Your Medications      These medications were sent to [address] [place] [address], [address] [address]    Hours: 24-hours Phone: [phone]   · HYDROcodone-acetaminophen 5-325 MG per tablet         [facility] GENERAL SURGERY  Heath Maldonado [address] [place] [address]  [phone]  On 8/4/2021  @115p    ENRIQUE Santizo [facility]  [phone]    On 8/3/2021  @215p.       Signed:  Electronically signed by Petra Schaffer PA-C on 8/2/21 at 10:08 AM CDT

## 2021-08-03 NOTE — PROGRESS NOTES
HISTORY OF PRESENT ILLNESS:    Ms. Jimbo Escobedo presents today for a one week post op breast check. She is currently taking Letrozole and Singulair. She is recently status post ultrasound guided breast biopsy  on the left which revealed a tiny micropapillary ductal   carcinoma in situ. ER is positive at 94%. MRI-5/26/2021  FINDINGS:  There is a large 8.5 x 3.3 x 5.4 cm area of non-mass enhancement in   the LEFT upper outer breast, located along the lateral and inferior   aspect of the biopsy clip marker. No focal suspicious solid mass. No   mass adjacent to the biopsy clip marker. No abnormal LEFT breast skin   thickening or nipple enhancement. No abnormal enhancement or suspicious mass in the RIGHT breast. No   abnormal RIGHT breast skin thickening or nipple enhancement. No enlarged axillary or internal mammary lymph nodes. Limited   evaluation of the anterior chest and upper abdomen is unremarkable. No   suspicious osseous lesions.       Impression   Impression:   1.  Large 8.5 x 3.3 x 5.4 cm area of regional non-mass enhancement in   the LEFT upper outer breast, with differential diagnosis including   DCIS. 2.  No suspicious focal mass in either breast. No focal mass adjacent   to the LEFT breast biopsy clip marker. 3.  No axillary or internal mammary lymphadenopathy. BI-RADS CATEGORY 6: KNOWN BIOPSY WITH PROVEN MALIGNANCY. Signed by Dr Romero Form on 5/26/2021 8:20 AM     Review of her MRI and her mammogram demonstrated an area of significant concern which is distinctly separate from the site of her initial biopsy. 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 This area however spreads over an 8 cm portion of her breast    She is now status post left nipple sparing mastectomy with immediate tissue expander reconstruction on 7/27/2021.          PATHOLOGY REVEALS:  FINAL DIAGNOSIS:     JUAN Klein, left skin and nipple sparing mastectomy:   1.  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 2.  In situ carcinoma demonstrates extensive intraluminal mucin   production. 3.  In situ carcinoma measures at least 8.0 cm in greatest dimension. 4.  In situ carcinoma extends to within 0.1 cm of the deep surgical   excision margin and 0.4 cm of the anterior margin. 5.  Negative for invasive carcinoma. B.  Breast, excision of additional breast tissue from beneath nipple:   Benign breast parenchyma. C.  Breast, excision of additional left breast anterior margin: Benign   breast parenchyma. AJCC STAGE: pTis, pNx, pMx     PHYSICAL EXAM:  The  wounds look good with no evidence of infection, fluid accumulation, or skin necrosis. CARRIE drain was removed without incident with one drain remaining    IMPRESSION:    Doing well s/p left nipple sparing mastectomy     PLAN: See Clary Lan in one week for a fluid check and pull remaining drain. She will call us sooner if drain is ready before then. Refer to Dr. Rosi Marte to see in a week. I will see her back in the office in one month. I have seen, examined and reviewed this patient medication list, appropriate labs and imaging studies. I reviewed relevant medical records and others physicians notes. I discussed the plans of care with the patient. I answered all the questions to the patients satisfaction. I, Dr Abhay Pelaez, personally performed the services described in this documentation as scribed by Aldair Peterson MA in my presence and is both accurate and complete.      (Please note that portions of this note were completed with a voice recognition program. Efforts were made to edit the dictations but occasionally words are mis-transcribed.)  Over 50% of the total visit time of  20 minutes in face to face encounter with the patient, out of which more than 50% of the time was spent in counseling patient or family and coordination of care. Counseling included but was not limited to time spent reviewing labs, imaging studies/ treatment plan and answering questions.

## 2021-08-04 ENCOUNTER — OFFICE VISIT (OUTPATIENT)
Dept: SURGERY | Age: 51
End: 2021-08-04

## 2021-08-04 VITALS — DIASTOLIC BLOOD PRESSURE: 70 MMHG | SYSTOLIC BLOOD PRESSURE: 110 MMHG | HEART RATE: 76 BPM

## 2021-08-04 DIAGNOSIS — Z90.12 S/P LEFT MASTECTOMY: ICD-10-CM

## 2021-08-04 DIAGNOSIS — D05.12 DUCTAL CARCINOMA IN SITU (DCIS) OF LEFT BREAST: Primary | ICD-10-CM

## 2021-08-04 PROCEDURE — 99024 POSTOP FOLLOW-UP VISIT: CPT | Performed by: SURGERY

## 2021-08-05 PROBLEM — Z90.12 S/P LEFT MASTECTOMY: Status: ACTIVE | Noted: 2021-08-05

## 2021-08-06 ENCOUNTER — HOSPITAL ENCOUNTER (INPATIENT)
Age: 51
LOS: 2 days | Discharge: HOME | DRG: 175 | End: 2021-08-08
Attending: EMERGENCY MEDICINE | Admitting: INTERNAL MEDICINE
Payer: COMMERCIAL

## 2021-08-06 ENCOUNTER — APPOINTMENT (OUTPATIENT)
Dept: CT IMAGING | Age: 51
DRG: 175 | End: 2021-08-06
Payer: COMMERCIAL

## 2021-08-06 DIAGNOSIS — I26.99 BILATERAL PULMONARY EMBOLISM (HCC): Primary | ICD-10-CM

## 2021-08-06 LAB
ALBUMIN SERPL-MCNC: 4 G/DL (ref 3.5–5.2)
ALP BLD-CCNC: 72 U/L (ref 35–104)
ALT SERPL-CCNC: 12 U/L (ref 5–33)
ANION GAP SERPL CALCULATED.3IONS-SCNC: 11 MMOL/L (ref 7–19)
APTT: 21.3 SEC (ref 26–36.2)
APTT: 35 SEC (ref 26–36.2)
AST SERPL-CCNC: 17 U/L (ref 5–32)
BASOPHILS ABSOLUTE: 0.1 K/UL (ref 0–0.2)
BASOPHILS RELATIVE PERCENT: 0.4 % (ref 0–1)
BILIRUB SERPL-MCNC: 0.3 MG/DL (ref 0.2–1.2)
BUN BLDV-MCNC: 12 MG/DL (ref 6–20)
C-REACTIVE PROTEIN: 6.96 MG/DL (ref 0–0.5)
CALCIUM SERPL-MCNC: 9 MG/DL (ref 8.6–10)
CHLORIDE BLD-SCNC: 104 MMOL/L (ref 98–111)
CO2: 22 MMOL/L (ref 22–29)
CREAT SERPL-MCNC: 0.5 MG/DL (ref 0.5–0.9)
EOSINOPHILS ABSOLUTE: 0.8 K/UL (ref 0–0.6)
EOSINOPHILS RELATIVE PERCENT: 4.9 % (ref 0–5)
GFR AFRICAN AMERICAN: >59
GFR NON-AFRICAN AMERICAN: >60
GLUCOSE BLD-MCNC: 96 MG/DL (ref 74–109)
HCG QUALITATIVE: NEGATIVE
HCT VFR BLD CALC: 32.5 % (ref 37–47)
HEMOGLOBIN: 10.8 G/DL (ref 12–16)
IMMATURE GRANULOCYTES #: 0.1 K/UL
INR BLD: 1.06 (ref 0.88–1.18)
LYMPHOCYTES ABSOLUTE: 1.7 K/UL (ref 1.1–4.5)
LYMPHOCYTES RELATIVE PERCENT: 11.3 % (ref 20–40)
MCH RBC QN AUTO: 32.1 PG (ref 27–31)
MCHC RBC AUTO-ENTMCNC: 33.2 G/DL (ref 33–37)
MCV RBC AUTO: 96.7 FL (ref 81–99)
MONOCYTES ABSOLUTE: 1.4 K/UL (ref 0–0.9)
MONOCYTES RELATIVE PERCENT: 8.9 % (ref 0–10)
NEUTROPHILS ABSOLUTE: 11.4 K/UL (ref 1.5–7.5)
NEUTROPHILS RELATIVE PERCENT: 74.1 % (ref 50–65)
PDW BLD-RTO: 13.2 % (ref 11.5–14.5)
PLATELET # BLD: 268 K/UL (ref 130–400)
PMV BLD AUTO: 9.6 FL (ref 9.4–12.3)
POTASSIUM SERPL-SCNC: 4.2 MMOL/L (ref 3.5–5)
PROCALCITONIN: 0.05 NG/ML (ref 0–0.09)
PROTHROMBIN TIME: 14 SEC (ref 12–14.6)
RBC # BLD: 3.36 M/UL (ref 4.2–5.4)
SARS-COV-2, NAAT: NOT DETECTED
SEDIMENTATION RATE, ERYTHROCYTE: 37 MM/HR (ref 0–20)
SODIUM BLD-SCNC: 137 MMOL/L (ref 136–145)
TOTAL PROTEIN: 7.4 G/DL (ref 6.6–8.7)
TROPONIN: <0.01 NG/ML (ref 0–0.03)
WBC # BLD: 15.4 K/UL (ref 4.8–10.8)

## 2021-08-06 PROCEDURE — 85610 PROTHROMBIN TIME: CPT

## 2021-08-06 PROCEDURE — 80053 COMPREHEN METABOLIC PANEL: CPT

## 2021-08-06 PROCEDURE — 86140 C-REACTIVE PROTEIN: CPT

## 2021-08-06 PROCEDURE — 85652 RBC SED RATE AUTOMATED: CPT

## 2021-08-06 PROCEDURE — 1210000000 HC MED SURG R&B

## 2021-08-06 PROCEDURE — 85025 COMPLETE CBC W/AUTO DIFF WBC: CPT

## 2021-08-06 PROCEDURE — 84484 ASSAY OF TROPONIN QUANT: CPT

## 2021-08-06 PROCEDURE — 36415 COLL VENOUS BLD VENIPUNCTURE: CPT

## 2021-08-06 PROCEDURE — 2580000003 HC RX 258: Performed by: INTERNAL MEDICINE

## 2021-08-06 PROCEDURE — 6360000004 HC RX CONTRAST MEDICATION: Performed by: EMERGENCY MEDICINE

## 2021-08-06 PROCEDURE — 84703 CHORIONIC GONADOTROPIN ASSAY: CPT

## 2021-08-06 PROCEDURE — 99283 EMERGENCY DEPT VISIT LOW MDM: CPT

## 2021-08-06 PROCEDURE — 85730 THROMBOPLASTIN TIME PARTIAL: CPT

## 2021-08-06 PROCEDURE — 87635 SARS-COV-2 COVID-19 AMP PRB: CPT

## 2021-08-06 PROCEDURE — 6360000002 HC RX W HCPCS: Performed by: INTERNAL MEDICINE

## 2021-08-06 PROCEDURE — 87040 BLOOD CULTURE FOR BACTERIA: CPT

## 2021-08-06 PROCEDURE — 96374 THER/PROPH/DIAG INJ IV PUSH: CPT

## 2021-08-06 PROCEDURE — 6360000002 HC RX W HCPCS: Performed by: EMERGENCY MEDICINE

## 2021-08-06 PROCEDURE — 6370000000 HC RX 637 (ALT 250 FOR IP): Performed by: INTERNAL MEDICINE

## 2021-08-06 PROCEDURE — 84145 PROCALCITONIN (PCT): CPT

## 2021-08-06 PROCEDURE — 71275 CT ANGIOGRAPHY CHEST: CPT

## 2021-08-06 RX ORDER — SODIUM CHLORIDE 9 MG/ML
25 INJECTION, SOLUTION INTRAVENOUS PRN
Status: DISCONTINUED | OUTPATIENT
Start: 2021-08-06 | End: 2021-08-08 | Stop reason: HOSPADM

## 2021-08-06 RX ORDER — HEPARIN SODIUM 1000 [USP'U]/ML
80 INJECTION, SOLUTION INTRAVENOUS; SUBCUTANEOUS PRN
Status: DISCONTINUED | OUTPATIENT
Start: 2021-08-06 | End: 2021-08-08

## 2021-08-06 RX ORDER — HEPARIN SODIUM 1000 [USP'U]/ML
80 INJECTION, SOLUTION INTRAVENOUS; SUBCUTANEOUS ONCE
Status: COMPLETED | OUTPATIENT
Start: 2021-08-06 | End: 2021-08-06

## 2021-08-06 RX ORDER — ACETAMINOPHEN 650 MG/1
650 SUPPOSITORY RECTAL EVERY 6 HOURS PRN
Status: DISCONTINUED | OUTPATIENT
Start: 2021-08-06 | End: 2021-08-08 | Stop reason: HOSPADM

## 2021-08-06 RX ORDER — ONDANSETRON 2 MG/ML
4 INJECTION INTRAMUSCULAR; INTRAVENOUS EVERY 6 HOURS PRN
Status: DISCONTINUED | OUTPATIENT
Start: 2021-08-06 | End: 2021-08-08 | Stop reason: HOSPADM

## 2021-08-06 RX ORDER — POTASSIUM CHLORIDE 20 MEQ/1
40 TABLET, EXTENDED RELEASE ORAL PRN
Status: DISCONTINUED | OUTPATIENT
Start: 2021-08-06 | End: 2021-08-08 | Stop reason: HOSPADM

## 2021-08-06 RX ORDER — ACETAMINOPHEN 325 MG/1
650 TABLET ORAL EVERY 6 HOURS PRN
Status: DISCONTINUED | OUTPATIENT
Start: 2021-08-06 | End: 2021-08-08 | Stop reason: HOSPADM

## 2021-08-06 RX ORDER — SODIUM CHLORIDE 0.9 % (FLUSH) 0.9 %
10 SYRINGE (ML) INJECTION EVERY 12 HOURS SCHEDULED
Status: DISCONTINUED | OUTPATIENT
Start: 2021-08-06 | End: 2021-08-08 | Stop reason: HOSPADM

## 2021-08-06 RX ORDER — ONDANSETRON 4 MG/1
4 TABLET, ORALLY DISINTEGRATING ORAL EVERY 8 HOURS PRN
Status: DISCONTINUED | OUTPATIENT
Start: 2021-08-06 | End: 2021-08-08 | Stop reason: HOSPADM

## 2021-08-06 RX ORDER — POTASSIUM CHLORIDE 7.45 MG/ML
10 INJECTION INTRAVENOUS PRN
Status: DISCONTINUED | OUTPATIENT
Start: 2021-08-06 | End: 2021-08-08 | Stop reason: HOSPADM

## 2021-08-06 RX ORDER — SODIUM CHLORIDE 0.9 % (FLUSH) 0.9 %
10 SYRINGE (ML) INJECTION PRN
Status: DISCONTINUED | OUTPATIENT
Start: 2021-08-06 | End: 2021-08-08 | Stop reason: HOSPADM

## 2021-08-06 RX ORDER — HEPARIN SODIUM 1000 [USP'U]/ML
40 INJECTION, SOLUTION INTRAVENOUS; SUBCUTANEOUS PRN
Status: DISCONTINUED | OUTPATIENT
Start: 2021-08-06 | End: 2021-08-08

## 2021-08-06 RX ORDER — HEPARIN SODIUM 10000 [USP'U]/100ML
408-2100 INJECTION, SOLUTION INTRAVENOUS CONTINUOUS
Status: DISCONTINUED | OUTPATIENT
Start: 2021-08-06 | End: 2021-08-08

## 2021-08-06 RX ADMIN — SODIUM CHLORIDE 1000 MG: 9 INJECTION INTRAMUSCULAR; INTRAVENOUS; SUBCUTANEOUS at 21:02

## 2021-08-06 RX ADMIN — HEPARIN SODIUM 6530 UNITS: 1000 INJECTION INTRAVENOUS; SUBCUTANEOUS at 15:47

## 2021-08-06 RX ADMIN — IOPAMIDOL 90 ML: 755 INJECTION, SOLUTION INTRAVENOUS at 14:49

## 2021-08-06 RX ADMIN — AZITHROMYCIN MONOHYDRATE 500 MG: 500 INJECTION, POWDER, LYOPHILIZED, FOR SOLUTION INTRAVENOUS at 21:01

## 2021-08-06 RX ADMIN — HEPARIN SODIUM AND DEXTROSE 408 UNITS/HR: 10000; 5 INJECTION INTRAVENOUS at 15:54

## 2021-08-06 RX ADMIN — ACETAMINOPHEN 650 MG: 325 TABLET ORAL at 21:03

## 2021-08-06 ASSESSMENT — PAIN DESCRIPTION - LOCATION: LOCATION: CHEST

## 2021-08-06 ASSESSMENT — PAIN DESCRIPTION - ORIENTATION: ORIENTATION: RIGHT

## 2021-08-06 ASSESSMENT — ENCOUNTER SYMPTOMS
COUGH: 0
RHINORRHEA: 0
NAUSEA: 0
DIARRHEA: 0
VOMITING: 0
SHORTNESS OF BREATH: 0
SORE THROAT: 0
BACK PAIN: 0
ABDOMINAL PAIN: 0

## 2021-08-06 ASSESSMENT — PAIN DESCRIPTION - PAIN TYPE: TYPE: ACUTE PAIN

## 2021-08-06 ASSESSMENT — PAIN SCALES - GENERAL
PAINLEVEL_OUTOF10: 6
PAINLEVEL_OUTOF10: 5

## 2021-08-06 NOTE — Clinical Note
Patient Class: Inpatient [101]   REQUIRED: Diagnosis: Bilateral pulmonary embolism ([facility] [address] [[id number]]   Estimated Length of Stay: Estimated stay of more than 2 midnights   Admitting Provider: Harry Hopkins [[id number]]

## 2021-08-06 NOTE — ED PROVIDER NOTES
[facility] EMERGENCY DEPT  eMERGENCY dEPARTMENT eNCOUnter      Pt Name: Jessica Hu  MRN: [id number]  [place] 1970  Date of evaluation: 8/6/2021  Provider: Delio Duncan MD    CHIEF COMPLAINT       Chief Complaint   Patient presents with    Other     possible DVT, Dr. Mali Casas sent to be checked         HISTORY OF PRESENT ILLNESS   (Location/Symptom, Timing/Onset,Context/Setting, Quality, Duration, Modifying Factors, Severity)  Note limiting factors. Jessica Hu is a 54 y.o. female who presents to the emergency department for concern of possible PE. Patient had a left-sided mastectomy 10 days ago by Dr. Mali Casas and had been doing well and saw him in the office 2 days ago. Last night before going to bed she developed sharp right-sided pleuritic chest discomfort that was quite severe. She states she has had ongoing pain today but not quite as bad but still describes it as a knifelike type sensation. Does not feel short of breath. Pain again is worse when deep breathing or if she moves a certain way. Denies any trauma. She has no prior history of PE or DVT. Has not noticed any leg swelling. No prior cardiac history as well. She is a non-smoker. No hemoptysis. History of hypertension but no diabetes or hyperlipidemia. Dr. Mali Casas sent her here for further evaluation. HPI    NursingNotes were reviewed. REVIEW OF SYSTEMS    (2-9 systems for level 4, 10 or more for level 5)     Review of Systems   Constitutional: Negative for chills and fever. HENT: Negative for rhinorrhea and sore throat. Respiratory: Negative for cough and shortness of breath. Cardiovascular: Positive for chest pain. Negative for leg swelling. Gastrointestinal: Negative for abdominal pain, diarrhea, nausea and vomiting. Genitourinary: Negative for dysuria, frequency and urgency. Musculoskeletal: Negative for back pain and neck pain.    Neurological: Negative for dizziness, syncope, light-headedness and headaches. All other systems reviewed and are negative. PAST MEDICALHISTORY     Past Medical History:   Diagnosis Date    Cancer [facility])     DCIS     delivery delivered     Ductal carcinoma in situ (DCIS) of left breast 2021    History of D&C     1993    Hypertension          SURGICAL HISTORY       Past Surgical History:   Procedure Laterality Date     SECTION      DILATION AND CURETTAGE OF UTERUS      X2    MASTECTOMY Left 2021    LEFT NIPPLE SPARING MASTECTOMY VIA WISE PATTERN, TISSUE EXPANDER RECONSTRUCTION, PEC BLOCK performed by Cori Navarro MD at [facility] LEFT Left 2021     BREAST NEEDLE BIOPSY LEFT 2021 [facility] GENERAL SURGERY         CURRENT MEDICATIONS     Previous Medications    CETIRIZINE (ZYRTEC) 10 MG TABLET    Take 10 mg by mouth daily    HYDROCODONE-ACETAMINOPHEN (NORCO) 5-325 MG PER TABLET    Take 1 tablet by mouth every 6 hours as needed for Pain. LISINOPRIL (PRINIVIL;ZESTRIL) 5 MG TABLET    Take 0.5 tablets by mouth daily    MUPIROCIN (BACTROBAN) 2 % OINTMENT    Apply to each nostril BID 5 days prior to surgery    PROBIOTIC PRODUCT (PROBIOTIC ADVANCED) CAPS    Take 1 capsule by mouth daily    TAMOXIFEN (NOLVADEX) 20 MG TABLET    Take 1 tablet by mouth daily       ALLERGIES     Patient has no known allergies.     FAMILY HISTORY       Family History   Problem Relation Age of Onset    Hypertension Mother     No Known Problems Father     Thyroid Disease Sister     Cancer Maternal Great Grandmother         Unknown          SOCIAL HISTORY       Social History     Socioeconomic History    Marital status:      Spouse name: Not on file    Number of children: Not on file    Years of education: Not on file    Highest education level: Not on file   Occupational History    Not on file   Tobacco Use    Smoking status: Never Smoker    Smokeless tobacco: Never Used   Vaping Use    Vaping Use: Never used Substance and Sexual Activity    Alcohol use: Never    Drug use: Never    Sexual activity: Yes     Partners: Male   Other Topics Concern    Not on file   Social History Narrative    Not on file     Social Determinants of Health     Financial Resource Strain:     Difficulty of Paying Living Expenses:    Food Insecurity:     Worried About Running Out of Food in the Last Year:     [address] in the Last Year:    Transportation Needs:     Lack of Transportation (Medical):  Lack of Transportation (Non-Medical):    Physical Activity:     Days of Exercise per Week:     Minutes of Exercise per Session:    Stress:     Feeling of Stress :    Social Connections:     Frequency of Communication with Friends and Family:     Frequency of Social Gatherings with Friends and Family:     Attends [employer] Services:     Active Member of Clubs or Organizations:     Attends Club or Organization Meetings:     Marital Status:    Intimate Partner Violence:     Fear of Current or Ex-Partner:     Emotionally Abused:     Physically Abused:     Sexually Abused:        SCREENINGS             PHYSICAL EXAM    (up to 7 for level 4, 8 or more for level 5)     ED Triage Vitals [08/06/21 1318]   BP Temp Temp Source Pulse Resp SpO2 Height Weight   131/89 98.7 °F (37.1 °C) Oral 93 18 95 % 5' 5\" (1.651 m) 180 lb (81.6 kg)       Physical Exam  Vitals and nursing note reviewed. Exam conducted with a chaperone present. Constitutional:       General: She is not in acute distress. Appearance: Normal appearance. She is well-developed. She is not ill-appearing or diaphoretic. HENT:      Head: Normocephalic and atraumatic. Right Ear: External ear normal.      Left Ear: External ear normal.      Nose: Nose normal.      Mouth/Throat:      Mouth: Mucous membranes are moist.   Eyes:      Conjunctiva/sclera: Conjunctivae normal.   Neck:      Trachea: No tracheal deviation.    Cardiovascular:      Rate and Rhythm: Normal embolus. This may represent a pulmonary   infarct. 4. Postoperative changes of the left breast, as described. The pulmonary embolus result was discussed with Dr. Victorina Soriano by Dr. Lelia Guerrero around 3:28 PM on 8/6/2021. Signed by Dr Charmaine Milligan:  Labs Reviewed   CBC WITH AUTO DIFFERENTIAL - Abnormal; Notable for the following components:       Result Value    WBC 15.4 (*)     RBC 3.36 (*)     Hemoglobin 10.8 (*)     Hematocrit 32.5 (*)     MCH 32.1 (*)     Neutrophils % 74.1 (*)     Lymphocytes % 11.3 (*)     Neutrophils Absolute 11.4 (*)     Monocytes Absolute 1.40 (*)     Eosinophils Absolute 0.80 (*)     All other components within normal limits   APTT - Abnormal; Notable for the following components:    aPTT 21.3 (*)     All other components within normal limits   COVID-19, RAPID   COMPREHENSIVE METABOLIC PANEL   TROPONIN   HCG, SERUM, QUALITATIVE   PROTIME-INR   APTT   APTT       All other labs were within normal range or not returned as of this dictation.     EMERGENCY DEPARTMENT COURSE and DIFFERENTIAL DIAGNOSIS/MDM:   Vitals:    Vitals:    08/06/21 1318   BP: 131/89   Pulse: 93   Resp: 18   Temp: 98.7 °F (37.1 °C)   TempSrc: Oral   SpO2: 95%   Weight: 180 lb (81.6 kg)   Height: 5' 5\" (1.651 m)       MDM  Number of Diagnoses or Management Options     Amount and/or Complexity of Data Reviewed  Clinical lab tests: ordered and reviewed  Tests in the radiology section of CPT®: ordered and reviewed  Discuss the patient with other providers: yes  Independent visualization of images, tracings, or specimens: yes      VSS, pt well appearing here, hx of breast CA and recent mastectomy 10 days ago now developed right sided pleuritic cp and found to have bilateral PE today unfortunately, have updated Dr. Hamida Cuevas and admitted to Dr. Yossi Bernard for ongoing monitoring and treatment      CONSULTS:  [address]:  Unless otherwise noted below, none Procedures    FINAL IMPRESSION      1. Bilateral pulmonary embolism (HCC)          DISPOSITION/PLAN   DISPOSITION        PATIENT REFERRED TO:  No follow-up provider specified.     DISCHARGE MEDICATIONS:  New Prescriptions    No medications on file          (Please note that portions of this note were completed with a voice recognition program.  Efforts were made to edit thedictations but occasionally words are mis-transcribed.)    Serenity Monroe MD (electronically signed)  Attending Emergency Physician        Laura Clark MD  08/06/21 3718

## 2021-08-06 NOTE — ED NOTES
Pt has had recent mastectomy on left breast. CARRIE drain is present, with dark red fluid. Pt began experiencing cp on right side, pt pcp suggested to come to ED.        Erika Mendez RN  08/06/21 3204

## 2021-08-06 NOTE — H&P
[facility]      History & Physical      PCP: ENRIQUE Garcia CNP    Date of Admission:2021    Patient:  José Benítez  MRN: [id number]    Date of Service: Pt seen/examined on 2021 and Admitted to Inpatient with expected LOS greater than two midnights due to medical therapy. CHIEF COMPLAINT:  Chest pain   Chief Complaint   Patient presents with    Other     possible DVT, Dr. Maria Luisa Henley sent to be checked       History Obtained From:  patient, electronic medical record  Primary Care Physician: ENRIQUE Garcia CNP    HISTORY OF PRESENT ILLNESS:    Ms. Heather Ponce, a 54 y.o. female with a history of HTN, breast cancer (status post mastectomy-2021), presented to [facility] ED (2021), on account of chronic acute onset of severe sharp/knifelike left-sided pleuritic chest pain/discomfort, which started prior to going to bed last night. Pain worsens with deep inspiration. Pain and discomfort has continued to persist upon awakening. CTA chest (2021): Bilateral pulmonary embolus. Focal parenchymal consolidation in the right lower lobe with surrounding groundglass opacity in the same distribution as the greatest amount of pulmonary embolus. Heparin infusion initiated  Patient admitted for further work-up and management.        PAST MEDICAL & SURGICAL HISTORY    Past Medical History:      Diagnosis Date    Cancer [facility])     DCIS     delivery delivered     Ductal carcinoma in situ (DCIS) of left breast 2021    History of D&C     1993    Hypertension          Past Surgical History:      Procedure Laterality Date     SECTION      DILATION AND CURETTAGE OF UTERUS      X2    MASTECTOMY Left 2021    LEFT NIPPLE SPARING MASTECTOMY VIA WISE PATTERN, TISSUE EXPANDER RECONSTRUCTION, PEC BLOCK performed by Nicholas Llanes MD at [address] LEFT Left 2021    US BREAST NEEDLE BIOPSY LEFT 2021 [facility]AL SURGERY          SOCIAL & FAMILY HISTORY:    Social History:   TOBACCO:   reports that she has never smoked. She has never used smokeless tobacco.  ETOH:   reports no history of alcohol use. Family History:       Problem Relation Age of Onset    Hypertension Mother     No Known Problems Father     Thyroid Disease Sister     Cancer Maternal Great Grandmother         Unknown        MEDICATIONS:    Medications Prior to Admission:    Prior to Admission medications    Medication Sig Start Date End Date Taking? Authorizing Provider   HYDROcodone-acetaminophen (NORCO) 5-325 MG per tablet Take 1 tablet by mouth every 6 hours as needed for Pain. 7/28/21 7/28/22  Suzanne Rowan MD   cetirizine (ZYRTEC) 10 MG tablet Take 10 mg by mouth daily    Historical Provider, MD   Probiotic Product (PROBIOTIC ADVANCED) CAPS Take 1 capsule by mouth daily    Historical Provider, MD   mupirocin (BACTROBAN) 2 % ointment Apply to each nostril BID 5 days prior to surgery 7/13/21   Suzanne Rowan MD   tamoxifen (NOLVADEX) 20 MG tablet Take 1 tablet by mouth daily 6/25/21   Suzanne Rowan MD   lisinopril (PRINIVIL;ZESTRIL) 5 MG tablet Take 0.5 tablets by mouth daily 5/6/21   ENRIQUE Vick - CNP        ALLERGIES:    Allergies:  Patient has no known allergies. REVIEW OF SYSTEMS :    Review of Systems  14 point review of systems is negative except as specifically addressed above. PHYSICAL EXAM:    Physical Exam:    Vitals:   /89   Pulse 93   Temp 98.7 °F (37.1 °C) (Oral)   Resp 18   Ht 5' 5\" (1.651 m)   Wt 180 lb (81.6 kg)   SpO2 95%   BMI 29.95 kg/m²     Physical Exam  Vitals and nursing note reviewed. Constitutional:       General: She is not in acute distress. Appearance: Normal appearance. She is not ill-appearing, toxic-appearing or diaphoretic. HENT:      Head: Normocephalic and atraumatic.       Right Ear: External ear normal.      Left Ear: External ear normal.      Nose: Nose normal. No congestion or rhinorrhea. Mouth/Throat:      Mouth: Mucous membranes are moist.      Pharynx: Oropharynx is clear. No oropharyngeal exudate or posterior oropharyngeal erythema. Eyes:      General: No scleral icterus. Right eye: No discharge. Left eye: No discharge. Extraocular Movements: Extraocular movements intact. Conjunctiva/sclera: Conjunctivae normal.      Pupils: Pupils are equal, round, and reactive to light. Neck:      Vascular: No carotid bruit. Cardiovascular:      Rate and Rhythm: Normal rate and regular rhythm. Pulses: Normal pulses. Heart sounds: Normal heart sounds. No murmur heard. No friction rub. No gallop. Pulmonary:      Effort: Pulmonary effort is normal. No respiratory distress. Breath sounds: Normal breath sounds. No stridor. No wheezing, rhonchi or rales. Chest:      Chest wall: No tenderness. Abdominal:      General: Bowel sounds are normal. There is no distension. Palpations: Abdomen is soft. Tenderness: There is no abdominal tenderness. There is no guarding or rebound. Musculoskeletal:         General: No swelling, tenderness, deformity or signs of injury. Normal range of motion. Cervical back: Normal range of motion and neck supple. No rigidity. No muscular tenderness. Right lower leg: No edema. Left lower leg: No edema. Skin:     General: Skin is warm and dry. Capillary Refill: Capillary refill takes less than 2 seconds. Coloration: Skin is not jaundiced or pale. Findings: No bruising, erythema, lesion or rash. Neurological:      General: No focal deficit present. Mental Status: She is alert and oriented to person, place, and time. Cranial Nerves: No cranial nerve deficit. Sensory: No sensory deficit. Motor: No weakness.       Coordination: Coordination normal.   Psychiatric:         Mood and Affect: Mood normal.         Behavior: Behavior normal.         Thought Content: Thought content normal.         Judgment: Judgment normal.             DIAGNOSTIC STUDIES:    I have reviewedLaboratory and Imaging data report today. Recent Labs     08/06/21  1416   WBC 15.4*   HGB 10.8*        Recent Labs     08/06/21  1416      K 4.2      CO2 22   BUN 12   CREATININE 0.5   GLUCOSE 96   AST 17   ALT 12   BILITOT 0.3   ALKPHOS 72     Troponin T:   Recent Labs     08/06/21  1416   TROPONINI <0.01     Pro-BNP: No results found for: BNP  Lactate: No results found for: LACTA      ABGs: No results found for: PHART, PO2ART, [id number]  INR:   Recent Labs     08/06/21  1542   INR 1.06     TSH:   Lab Results   Component Value Date    TSH 2.150 04/13/2021     URINALYSIS:No results for input(s): NITRITE, COLORU, PHUR, LABCAST, WBCUA, RBCUA, MUCUS, TRICHOMONAS, YEAST, BACTERIA, CLARITYU, SPECGRAV, LEUKOCYTESUR, UROBILINOGEN, BILIRUBINUR, BLOODU, GLUCOSEU, AMORPHOUS in the last 72 hours. Invalid input(s): Dagmar Polanco / IMAGING REPORTS:     CTA PULMONARY W CONTRAST    Result Date: 8/6/2021  EXAMINATION:  CTA PULMONARY W CONTRAST  8/6/2021 4:25 PM HISTORY: Right chest pain. Left mastectomy 10 days ago. There is clinical concern for pulmonary embolus. COMPARISON: No comparison study. DLP: 456 mGy-cm. Automated exposure control was utilized. TECHNIQUE: Spiral CT angiography was performed of the chest with contrast. Sagittal, coronal and 3-D images were reconstructed. MEDIASTINUM/VASCULAR: There is pulmonary embolus bilaterally. There is a greater amount of pulmonary embolus in right lower lobe pulmonary artery branches. The right heart chambers do not appear to be dilated compared to the left. The main pulmonary artery segment measures 2.7 cm. There is no reflux of contrast into the inferior vena cava. The thoracic aorta is normal caliber. There is mild cardiomegaly.  LUNGS: There is focal parenchymal consolidation in the right lower lobe with surrounding groundglass opacity. This is in the same distribution as the greatest amount of pulmonary embolus. Therefore, this could represent an area of pulmonary infarction. There is mild left lower lobe atelectasis. UPPER ABDOMEN: There is no significant abnormality identified in the upper abdomen. BONES/SOFT TISSUES/: No acute appearing bony abnormality is seen. There has been recent left breast surgery with mastectomy and reconstruction with a soft tissue expander. There is some edema surrounding the tissue expander. There is air density within the expander. There is a surgical drain extending around the soft tissue expander. 1. Small to moderate amount of pulmonary embolus greatest in the right lower lobe pulmonary artery branches. There is bilateral pulmonary embolus. 2. No definite CT evidence of right heart strain. There is mild cardiomegaly. 3. Focal parenchymal consolidation in the right lower lobe with surrounding groundglass opacity in the same distribution as the greatest amount of pulmonary embolus. This may represent a pulmonary infarct. 4. Postoperative changes of the left breast, as described. The pulmonary embolus result was discussed with Dr. Mimi Garcia by Dr. Kaykay Hays around 3:28 PM on 8/6/2021. Signed by Dr Leyda Mares / Sonido Costello:      Patient Active Problem List    Diagnosis Date Noted    Bilateral pulmonary embolism ([address] 08/06/2021    S/P left mastectomy with expander reconstruction 7/27/2021 08/05/2021    Intraductal carcinoma in situ of left breast 07/27/2021    Ductal carcinoma in situ (DCIS) of left breast 06/04/2021    Atypical ductal hyperplasia of breast 05/28/2021    Lump or mass in breast 05/28/2021       Hospital Problems         Last Modified POA    Bilateral pulmonary embolism ([address] 8/6/2021 Yes          Active Problems:    Bilateral pulmonary embolism ([address]  Resolved Problems:    * No resolved hospital problems.  *        Acute Bilateral PE  · CTA chest (08/06/2021): Impression: Small to moderate amount of pulmonary embolus greatest in the right lower lobe pulmonary artery branches. There is bilateral pulmonary embolus. No definite CT evidence of right heart strain. There is mild cardiomegaly. Focal parenchymal consolidation in the right lower lobe with surrounding groundglass opacity in the same distribution as the greatest amount of pulmonary embolus. This may represent a pulmonary infarct. Postoperative changes of the left breast, as described. · Heparin ggt initiated in the ED  · Bilateral lower extremity duplex ultrasound    ? Pneumonia, due to unspecified organism.  - Afebrile    Leukocytosis (WBC: 15.4)  · CTA chest (08/06/2021): Focal parenchymal consolidation in the right lower lobe with surrounding groundglass opacity   -Check Procalcitonin level   - Blood culture    - Urine legionella antigen   - Urine strep antigen   - Start Ceftriaxone and Azithromycin   - Oxygen supplementation to keep SPO2 > 92%   - Bronchodilators as needed   - Dextromethorphan/Guaifenesin for symptomatic relief   - Continue to monitor  ·     History of breast cancer  · Status post mastectomy (07/27/2021)  · General surgery consulted from ED  ·       Continue management of other chronic medical conditions - Please see orders above         CONSULTS:    IP CONSULT TO GENERAL SURGERY  IP CONSULT TO SOCIAL WORK        INPATIENT CHECKLIST:      Nutrition: No diet orders on file    Prophylaxis Orders:   VTE - Heparin    CODE STATUS: Full Code  ISOLATION:       DISCHARGE PLAN: tbd     Total face-to-face time spent with this patient, time spent reviewing medical records, and in coordination of care with the emergency department physician, nursing staff, in the examination, evaluation/assessment, counseling, review of medications and plan, was  50 mins .      Electronically signed by   Lexie Boone MD, MPH, MD  Internal Medicine Hospitalist   8/6/2021 5:22 SOCRATES Dugan/Transcription disclaimer:   Much of this encounter note is an electronic transcription/translation of spoken language to printed text.  The electronic translation of spoken language may permit erroneous, or at times, nonsensical words or phrases to be inadvertently transcribed; although attempts have made to review the note for such errors, some may still exist.

## 2021-08-06 NOTE — ED TRIAGE NOTES
Patient had left sided mastectomy on 07/27.   Patient has right sided rib pain that hurts more with inspiration, Dr. Yanique Cutler concerned about PE

## 2021-08-07 ENCOUNTER — CLINICAL DOCUMENTATION (OUTPATIENT)
Dept: ONCOLOGY | Age: 51
End: 2021-08-07

## 2021-08-07 LAB
ANION GAP SERPL CALCULATED.3IONS-SCNC: 9 MMOL/L (ref 7–19)
APTT: 34.3 SEC (ref 26–36.2)
APTT: 56.8 SEC (ref 26–36.2)
APTT: 63.8 SEC (ref 26–36.2)
APTT: 91.5 SEC (ref 26–36.2)
BASOPHILS ABSOLUTE: 0 K/UL (ref 0–0.2)
BASOPHILS RELATIVE PERCENT: 0.3 % (ref 0–1)
BUN BLDV-MCNC: 12 MG/DL (ref 6–20)
CALCIUM SERPL-MCNC: 9 MG/DL (ref 8.6–10)
CHLORIDE BLD-SCNC: 100 MMOL/L (ref 98–111)
CO2: 28 MMOL/L (ref 22–29)
CREAT SERPL-MCNC: 0.6 MG/DL (ref 0.5–0.9)
EOSINOPHILS ABSOLUTE: 0.8 K/UL (ref 0–0.6)
EOSINOPHILS RELATIVE PERCENT: 6.1 % (ref 0–5)
GFR AFRICAN AMERICAN: >59
GFR NON-AFRICAN AMERICAN: >60
GLUCOSE BLD-MCNC: 100 MG/DL (ref 74–109)
HCT VFR BLD CALC: 32.7 % (ref 37–47)
HEMOGLOBIN: 10.7 G/DL (ref 12–16)
IMMATURE GRANULOCYTES #: 0.1 K/UL
LYMPHOCYTES ABSOLUTE: 2.1 K/UL (ref 1.1–4.5)
LYMPHOCYTES RELATIVE PERCENT: 15.2 % (ref 20–40)
MCH RBC QN AUTO: 31.5 PG (ref 27–31)
MCHC RBC AUTO-ENTMCNC: 32.7 G/DL (ref 33–37)
MCV RBC AUTO: 96.2 FL (ref 81–99)
MONOCYTES ABSOLUTE: 1.2 K/UL (ref 0–0.9)
MONOCYTES RELATIVE PERCENT: 8.9 % (ref 0–10)
NEUTROPHILS ABSOLUTE: 9.3 K/UL (ref 1.5–7.5)
NEUTROPHILS RELATIVE PERCENT: 69 % (ref 50–65)
PDW BLD-RTO: 13.2 % (ref 11.5–14.5)
PLATELET # BLD: 300 K/UL (ref 130–400)
PMV BLD AUTO: 9.7 FL (ref 9.4–12.3)
POTASSIUM REFLEX MAGNESIUM: 4.1 MMOL/L (ref 3.5–5)
RBC # BLD: 3.4 M/UL (ref 4.2–5.4)
SODIUM BLD-SCNC: 137 MMOL/L (ref 136–145)
WBC # BLD: 13.5 K/UL (ref 4.8–10.8)

## 2021-08-07 PROCEDURE — 83516 IMMUNOASSAY NONANTIBODY: CPT

## 2021-08-07 PROCEDURE — 86147 CARDIOLIPIN ANTIBODY EA IG: CPT

## 2021-08-07 PROCEDURE — 85613 RUSSELL VIPER VENOM DILUTED: CPT

## 2021-08-07 PROCEDURE — 1210000000 HC MED SURG R&B

## 2021-08-07 PROCEDURE — 93970 EXTREMITY STUDY: CPT

## 2021-08-07 PROCEDURE — 81291 MTHFR GENE: CPT

## 2021-08-07 PROCEDURE — 85730 THROMBOPLASTIN TIME PARTIAL: CPT

## 2021-08-07 PROCEDURE — 86146 BETA-2 GLYCOPROTEIN ANTIBODY: CPT

## 2021-08-07 PROCEDURE — 85305 CLOT INHIBIT PROT S TOTAL: CPT

## 2021-08-07 PROCEDURE — 85025 COMPLETE CBC W/AUTO DIFF WBC: CPT

## 2021-08-07 PROCEDURE — 6360000002 HC RX W HCPCS: Performed by: INTERNAL MEDICINE

## 2021-08-07 PROCEDURE — 85610 PROTHROMBIN TIME: CPT

## 2021-08-07 PROCEDURE — 85302 CLOT INHIBIT PROT C ANTIGEN: CPT

## 2021-08-07 PROCEDURE — 85635 REPTILASE TEST: CPT

## 2021-08-07 PROCEDURE — 2580000003 HC RX 258: Performed by: INTERNAL MEDICINE

## 2021-08-07 PROCEDURE — 81240 F2 GENE: CPT

## 2021-08-07 PROCEDURE — 6370000000 HC RX 637 (ALT 250 FOR IP): Performed by: INTERNAL MEDICINE

## 2021-08-07 PROCEDURE — 99223 1ST HOSP IP/OBS HIGH 75: CPT | Performed by: INTERNAL MEDICINE

## 2021-08-07 PROCEDURE — 85300 ANTITHROMBIN III ACTIVITY: CPT

## 2021-08-07 PROCEDURE — 36415 COLL VENOUS BLD VENIPUNCTURE: CPT

## 2021-08-07 PROCEDURE — 80048 BASIC METABOLIC PNL TOTAL CA: CPT

## 2021-08-07 PROCEDURE — 85670 THROMBIN TIME PLASMA: CPT

## 2021-08-07 PROCEDURE — 85301 ANTITHROMBIN III ANTIGEN: CPT

## 2021-08-07 PROCEDURE — 99024 POSTOP FOLLOW-UP VISIT: CPT | Performed by: SURGERY

## 2021-08-07 PROCEDURE — 83090 ASSAY OF HOMOCYSTEINE: CPT

## 2021-08-07 PROCEDURE — 81241 F5 GENE: CPT

## 2021-08-07 RX ADMIN — SODIUM CHLORIDE 1000 MG: 9 INJECTION INTRAMUSCULAR; INTRAVENOUS; SUBCUTANEOUS at 21:44

## 2021-08-07 RX ADMIN — AZITHROMYCIN MONOHYDRATE 500 MG: 500 INJECTION, POWDER, LYOPHILIZED, FOR SOLUTION INTRAVENOUS at 21:43

## 2021-08-07 RX ADMIN — ACETAMINOPHEN 650 MG: 325 TABLET ORAL at 09:35

## 2021-08-07 ASSESSMENT — ENCOUNTER SYMPTOMS
DIARRHEA: 0
WHEEZING: 0
EYES NEGATIVE: 1
NAUSEA: 0
BACK PAIN: 0
EYE REDNESS: 0
CONSTIPATION: 0
GASTROINTESTINAL NEGATIVE: 1
SHORTNESS OF BREATH: 0
VOMITING: 0
SORE THROAT: 0
ABDOMINAL PAIN: 0
BLOOD IN STOOL: 0
EYE PAIN: 0
COUGH: 0
RESPIRATORY NEGATIVE: 1
EYE DISCHARGE: 0

## 2021-08-07 ASSESSMENT — PAIN SCALES - GENERAL: PAINLEVEL_OUTOF10: 5

## 2021-08-07 NOTE — PROGRESS NOTES
[facility] General Surgery    Progress Note    POD # 11    Subjective:    Resting comfortably in bed. Notes mild shortness of breath. No pain at the operative site. Reports that her CARRIE drain is working normally. Objective:    Vitals:    08/06/21 1715 08/06/21 1945 08/07/21 0324 08/07/21 0752   BP: 119/78 (!) 129/91 105/63 118/83   Pulse: 89 89 85 90   Resp:  16 18 16   Temp:  97.8 °F (36.6 °C) 97.8 °F (36.6 °C) 97.6 °F (36.4 °C)   TempSrc:  Temporal Temporal Temporal   SpO2: 98% 98% 96% 98%   Weight:       Height:         I/O last 3 completed shifts: In: 774.8 [P.O.:760; I.V.:14.8]  Out: 40 [Drains:40]     Left breast incision is clean dry and intact. No erythema noted. Implant remains in place. CARRIE drain exit site is unremarkable. The drain itself contains a small amount of old blood. LABS:   CBC:   Recent Labs     08/06/21  1416 08/07/21  0351   WBC 15.4* 13.5*   RBC 3.36* 3.40*   HGB 10.8* 10.7*   HCT 32.5* 32.7*    300   LYMPHOPCT 11.3* 15.2*   MONOPCT 8.9 8.9   BASOPCT 0.4 0.3   MONOSABS 1.40* 1.20*   LYMPHSABS 1.7 2.1   EOSABS 0.80* 0.80*   BASOSABS 0.10 0.00      BMP:   Recent Labs     08/06/21  1416 08/07/21  0351    137   K 4.2 4.1    100   CO2 22 28   ANIONGAP 11 9   GLUCOSE 96 100   CREATININE 0.5 0.6   LABGLOM >60 >60   CALCIUM 9.0 9.0     LFT:   Recent Labs     08/06/21  1416   PROT 7.4   LABALBU 4.0   BILITOT 0.3   ALKPHOS 72   ALT 12   AST 17       Assessment:    1. Bilateral pulmonary emboli. 2.  Otherwise continued satisfactory recovery following left simple mastectomy with first stage breast reconstruction. Plan:    1. Leave the CARRIE drain for now. 2.  We spoke about the timing of her second stage reconstruction. This had been planned for later this month but I told her it would probably be several months before she would be able to proceed given the need for her to be on blood thinners.   She was appropriately disappointed and will speak further with Dr. Mary Degroot about the timing. 3.  Anticoagulation per the medical service.

## 2021-08-07 NOTE — PROGRESS NOTES
Please cancel Desiree's appointment with Dr. Candido Hsu on 8/11/2021, she was seen in consultation at [facility] this weekend for new finding of pulmonary emboli and new left breast DCIS by Dr. Oswaldo Vegas. Treatment plan was developed in coordination with Dr. Yanique Cutler. Please schedule a follow-up appointment with me, ENRIQUE Latif in 2 weeks. Please mail new patient packet and contact patient with appointment.

## 2021-08-07 NOTE — CONSULTS
Consultation     Pt Name: Felice Torres  MRN: [id number]  [place]: 1970  Date of evaluation: 8/7/2021      REASON FOR CONSULTATION: New diagnosis of pulmonary emboli and recent diagnosis of ductal carcinoma in situ of left breast    REQUESTING PHYSICIAN:Fran Pete MD    ATTENDING/ADMITTING PHYSICIAN:Fran Pete MD     History Obtained From:  patient, electronic medical record    HISTORY OF PRESENT ILLNESS:    Ms. Terry Lynch is a 54 y.o. female who recently underwent simple left breast mastectomy by Dr. 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 Dr. Mo Bhandari has placed her on preventative tamoxifen and she reported being compliant and tolerating without difficulty. Rex Seymour presented to [address] [place] [address] emergency room after developing right-sided sharp pleuritic chest discomfort on 8/5/2021 that has been ongoing. She described as\" knifelike type sensation\". She denied experiencing any shortness of breath although reported the pain worsening with deep breathing or with change in position. She denied any bilateral lower extremity edema. CTA of the chest with contrast on 8/6/2021  · Small to moderate amount of pulmonary embolus greatest in the right lower lobe pulmonary artery branches. There is bilateral pulmonary embolus. · No definite CT evidence of right heart strain. · Mild cardiomegaly. · Focal parenchymal consolidation in the right lower lobe with surrounding groundglass opacity in the same distribution as thegreatest amount of pulmonary embolus. This may represent a pulmonary infarct. PT 14.0/INR 1.06 and aPTT 21.3 on 8/6/2021  COVID-19, rapid negative on 8/6/2021    Rex Seymour was placed on on heparin drip and admitted for further evaluation and recommendations. Medical hematology/oncology consultation requested.       TUMOR HISTORY: Ductal Carcinoma in Situ of the left breast, ER 94%, stage 0, pTis, pNx, pMx    Ruth Mcdonnell [facility] A Pap smear was completed and she was incidentally noted to have an elevated blood pressure and placed on lisinopril with recommendations for close monitoring of BP. Orders were also given for routine screening arrangements were made for bilateral mammogram.    Bilateral mammogram at [facility] on 4/19/2021  · Left breast with multiple groups of calcifications, which have increased in size and number compared to 07/14/2014. · No mammographic evidence of malignancy in the right breast.     Left mammogram with CAD at [facility] on 4/23/2021  · Multiple similar appearing groups of indeterminate calcifications in   the left breast. Recommend stereotactic left breast biopsy. Ultrasound-guided left breast biopsy at [facility] on 4/30/2021   · Pathology indicating atypical ductal hyperplasia. · Tissue was sent to Dr. Ariel Bearden for consultation and she reported \"the changes are concerning for low-grade ductal carcinoma in situ, but are insufficiently developed to establish the diagnosis and the sample. Final classification should be made on excisional biopsy specimen\". MRI of bilateral breast on 5/26/2021 at [facility]  · Large 8.5 x 3.3 x 5.4 cm area of regional non-mass enhancement in the LEFT upper outer breast, with differential diagnosis including DCIS. · No suspicious focal mass in either breast. No focal mass adjacentto the LEFT breast biopsy clip marker. · No axillary or internal mammary lymphadenopathy      Ruth Mcdonnell was seen in initial consultation by KATHLEEN Wheat on 5/21/2021 followed by an appointment with Dr. Juany Obrien on 5/27/2021. Left breast biopsy on 6/2/2021 by Dr. Juany Obrien  · Micropapillary ductal carcinoma in situ. · Negative for invasion  · ER is positive at 94%. Seen by Dr. Juany Obrien on 6/21/2021 and initiated on preventive tamoxifen 20 mg PO.       Left nipple sparing mastectomy with immediate tissue expander reconstruction on 2021 by Dr. Jim Hendrix  Pathology:   1.  Extensive low-grade ductal carcinoma in situ (micropapillary and cribriform types). 2.  In situ carcinoma demonstrates extensive intraluminal mucin production. 3.  In situ carcinoma measures at least 8.0 cm in greatest dimension. 4.  In situ carcinoma extends to within 0.1 cm of the deep surgical excision margin and 0.4 cm of the anterior margin. 5.  Negative for invasive carcinoma. AJCC STAGE: pTis, pNx, pMx       Treatment summary:  1. Initiated on preventative tamoxifen 20 mg on 2021  2. Left nipple sparing mastectomy with immediate tissue expander reconstruction on 2021 by Dr. Jim Hendrix    Past Medical History:    Past Medical History:   Diagnosis Date    Cancer [facility])     DCIS     delivery delivered     Ductal carcinoma in situ (DCIS) of left breast 2021    History of D&C     1993    Hypertension      PastSurgical History:    Past Surgical History:   Procedure Laterality Date     SECTION      DILATION AND CURETTAGE OF UTERUS      X2    MASTECTOMY Left 2021    LEFT NIPPLE SPARING MASTECTOMY VIA WISE PATTERN, TISSUE EXPANDER RECONSTRUCTION, PEC BLOCK performed by Vickie Dejesus MD at [facility] [address] LEFT Left 2021    US BREAST NEEDLE BIOPSY LEFT 2021 [facility]     Medications Prior to Admission:    Medications Prior to Admission: HYDROcodone-acetaminophen (NORCO) 5-325 MG per tablet, Take 1 tablet by mouth every 6 hours as needed for Pain.   cetirizine (ZYRTEC) 10 MG tablet, Take 10 mg by mouth daily  Probiotic Product (PROBIOTIC ADVANCED) CAPS, Take 1 capsule by mouth daily  [DISCONTINUED] mupirocin (BACTROBAN) 2 % ointment, Apply to each nostril BID 5 days prior to surgery  tamoxifen (NOLVADEX) 20 MG tablet, Take 1 tablet by mouth daily  lisinopril (PRINIVIL;ZESTRIL) 5 MG tablet, Take 0.5 tablets by mouth daily  Current Meds:  Scheduled Meds:   cefTRIAXone (ROCEPHIN) IV  1,000 mg Intravenous Q24H    azithromycin  500 mg Intravenous Q24H    sodium chloride flush  10 mL Intravenous 2 times per day       ContinuousInfusions:   heparin (PORCINE) Infusion 18 Units/kg/hr (08/07/21 0955)    sodium chloride       PRN Meds:heparin (porcine), heparin (porcine), sodium chloride flush, sodium chloride, potassium chloride **OR** potassium alternative oral replacement **OR** potassium chloride, ondansetron **OR** ondansetron, magnesium hydroxide, acetaminophen **OR** acetaminophen    Allergies:    Patient has no known allergies. Social History:    reports that she has never smoked. She has never used smokeless tobacco. She reports that she does not drink alcohol and does not use drugs.     Family History:   family history includes Cancer in her maternal great grandmother; Hypertension in her mother; No Known Problems in her father; Thyroid Disease in her sister. Review of Systems   Constitutional: Negative. Negative for chills, diaphoresis and fever. HENT: Negative. Negative for congestion, ear pain, hearing loss, nosebleeds, sore throat and tinnitus. Eyes: Negative. Negative for pain, discharge and redness. Respiratory: Negative. Negative for cough, shortness of breath and wheezing. Pain with deep inspiration   Cardiovascular: Negative. Negative for chest pain, palpitations and leg swelling. Gastrointestinal: Negative. Negative for abdominal pain, blood in stool, constipation, diarrhea, nausea and vomiting. Endocrine: Negative for polydipsia. Genitourinary: Negative for dysuria, flank pain, frequency, hematuria and urgency. Musculoskeletal: Negative. Negative for back pain, myalgias and neck pain. Skin: Negative. Negative for rash. CARRIE drain to left chest   Neurological: Negative. Negative for dizziness, tremors, seizures, weakness and headaches.    Hematological: Does not bruise/bleed easily. Psychiatric/Behavioral: Negative. The patient is not nervous/anxious. Physical Exam  Vitals reviewed. Constitutional:       General: She is not in acute distress. Appearance: She is well-developed. She is not diaphoretic. HENT:      Head: Normocephalic and atraumatic. Mouth/Throat:      Pharynx: Uvula midline. Tonsils: No tonsillar exudate. Eyes:      General: Lids are normal. No scleral icterus. Right eye: No discharge. Left eye: No discharge. Conjunctiva/sclera: Conjunctivae normal.      Pupils: Pupils are equal, round, and reactive to light. Neck:      Thyroid: No thyroid mass or thyromegaly. Vascular: No JVD. Trachea: Trachea normal. No tracheal deviation. Cardiovascular:      Rate and Rhythm: Normal rate and regular rhythm. Heart sounds: Normal heart sounds. No murmur heard. No friction rub. No gallop. Pulmonary:      Effort: Pulmonary effort is normal. No respiratory distress. Breath sounds: Normal breath sounds. No wheezing or rales. Chest:      Chest wall: No tenderness. Abdominal:      General: Bowel sounds are normal. There is no distension. Palpations: Abdomen is soft. There is no mass. Tenderness: There is no abdominal tenderness. There is no guarding or rebound. Hernia: No hernia is present. Musculoskeletal:         General: No tenderness or deformity. Cervical back: Normal range of motion and neck supple. Comments: Range of motion within normal limits x4 extremities   Skin:     General: Skin is warm. Coloration: Skin is not pale. Findings: No erythema or rash. Neurological:      Mental Status: She is alert and oriented to person, place, and time. Cranial Nerves: No cranial nerve deficit. Coordination: Coordination normal.   Psychiatric:         Behavior: Behavior normal.         Thought Content:  Thought content normal.         Vitals:  /71 Pulse 92   Temp 97.8 °F (36.6 °C) (Temporal)   Resp 16   Ht 5' 5\" (1.651 m)   Wt 180 lb (81.6 kg)   SpO2 97%   BMI 29.95 kg/m²       LABS:  Lab Results   Component Value Date    WBC 13.5 08/07/2021    RBC 3.40 08/07/2021    HGB 10.7 08/07/2021    HCT 32.7 08/07/2021    MCV 96.2 08/07/2021    MCH 31.5 08/07/2021    MCHC 32.7 08/07/2021    RDW 13.2 08/07/2021     08/07/2021    MPV 9.7 08/07/2021     Lab Results   Component Value Date     08/07/2021    K 4.1 08/07/2021     08/07/2021    CO2 28 08/07/2021    BUN 12 08/07/2021    CREATININE 0.6 08/07/2021    GFRAA >59 08/07/2021    LABGLOM >60 08/07/2021    GLUCOSE 100 08/07/2021    PROT 7.4 08/06/2021    LABALBU 4.0 08/06/2021    CALCIUM 9.0 08/07/2021    BILITOT 0.3 08/06/2021    ALKPHOS 72 08/06/2021    AST 17 08/06/2021    ALT 12 08/06/2021     Lab Results   Component Value Date    PROTIME 14.0 08/06/2021    INR 1.06 08/06/2021     Recent Labs     08/06/21  1416   TROPONINI <0.01     No results found for: Lonni Brim, PHUR, LABCAST, WBCUA, RBCUA, MUCUS, TRICHOMONAS, YEAST, BACTERIA, CLARITYU, SPECGRAV, LEUKOCYTESUR, UROBILINOGEN, BILIRUBINUR, BLOODU, GLUCOSEU, KETUA, AMORPHOUS  No results found for: MG, PHOS  No results found for: LABA1C  Lab Results   Component Value Date    TSH 2.150 04/13/2021     Lab Results   Component Value Date    TRIG 68 04/13/2021    HDL 55 04/13/2021    LDLCALC 132 04/13/2021     No results found for: AMYLASE, LIPASE  No results found for: BNP  No results found for: LACTA  No results found for: PHART, PH, KUT7WJS, PCO2, PO2ART, PO2, NQL1IDW, HCO3, BEART, BE, THGBART, THB, DLY6SQG, Y1ZTCPMM, O2SAT  No results found for: [facility] [place] [place]    Radiology  CTA PULMONARY W CONTRAST    Result Date: 8/6/2021  EXAMINATION:  CTA PULMONARY W CONTRAST  8/6/2021 4:25 PM HISTORY: Right chest pain. Left mastectomy 10 days ago. There is clinical concern for pulmonary embolus. COMPARISON: No comparison study. DLP: 456 mGy-cm.  Automated exposure control was utilized. TECHNIQUE: Spiral CT angiography was performed of the chest with contrast. Sagittal, coronal and 3-D images were reconstructed. MEDIASTINUM/VASCULAR: There is pulmonary embolus bilaterally. There is a greater amount of pulmonary embolus in right lower lobe pulmonary artery branches. The right heart chambers do not appear to be dilated compared to the left. The main pulmonary artery segment measures 2.7 cm. There is no reflux of contrast into the inferior vena cava. The thoracic aorta is normal caliber. There is mild cardiomegaly. LUNGS: There is focal parenchymal consolidation in the right lower lobe with surrounding groundglass opacity. This is in the same distribution as the greatest amount of pulmonary embolus. Therefore, this could represent an area of pulmonary infarction. There is mild left lower lobe atelectasis. UPPER ABDOMEN: There is no significant abnormality identified in the upper abdomen. BONES/SOFT TISSUES/: No acute appearing bony abnormality is seen. There has been recent left breast surgery with mastectomy and reconstruction with a soft tissue expander. There is some edema surrounding the tissue expander. There is air density within the expander. There is a surgical drain extending around the soft tissue expander. 1. Small to moderate amount of pulmonary embolus greatest in the right lower lobe pulmonary artery branches. There is bilateral pulmonary embolus. 2. No definite CT evidence of right heart strain. There is mild cardiomegaly. 3. Focal parenchymal consolidation in the right lower lobe with surrounding groundglass opacity in the same distribution as the greatest amount of pulmonary embolus. This may represent a pulmonary infarct. 4. Postoperative changes of the left breast, as described. The pulmonary embolus result was discussed with Dr. Jose Brown by Dr. Patric Valdes around 3:28 PM on 8/6/2021.  Signed by Dr Amanda Kahn LEFT    Result Date: 8/2/2021  INDICATIONS: Preoperative marking for surgical planning TECHNIQUE: Targeted ultrasound was done of the upper outer quadrant of the left breast. Multiple longitudinal and transverse sonographic images were obtained and reviewed in real-time with the technologist. The clip was seen at the 2:00 position 6 cm from the nipple. The skin was marked with an indelible marker to facilitate surgical planning. Well-visualized clip left breast RECOMMENDATIONS: Proceed with surgery when clinically indicated Signed by Dr Ericka Gomez        ASSESSMENT/PLAN:    Ms. Dave Marrero is a 54 y.o. female who recently underwent simple left breast mastectomy by Dr. 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 Dr. Patric Betts has placed her on preventative tamoxifen and she reported being compliant and tolerating without difficulty. Altagracia Cardoso presented to [address] [place] [address] emergency room after developing right-sided sharp pleuritic chest discomfort on 8/5/2021 that has been ongoing. She described as\" knifelike type sensation\". She denied experiencing any shortness of breath although reported the pain worsening with deep breathing or with change in position. She denied any bilateral lower extremity edema. #1  Bilateral pulmonary emboli    CTA of the chest with contrast on 8/6/2021  · Small to moderate amount of pulmonary embolus greatest in the right lower lobe pulmonary artery branches. There is bilateral pulmonary embolus. · No definite CT evidence of right heart strain. · Mild cardiomegaly. · Focal parenchymal consolidation in the right lower lobe with surrounding groundglass opacity in the same distribution as thegreatest amount of pulmonary embolus. This may represent a pulmonary infarct.     PT 14.0/INR 1.06 and aPTT 21.3 on 8/6/2021  COVID-19, rapid negative on 8/6/2021    Initiated on heparin drip on 8/6/2021  Recommend initiating Eliquis 10 mg p.o. twice daily x7 days followed by Eliquis 5 mg p.o. twice daily with no stop date    Anticipating noninvasive venous study of bilateral lower extremities    Prothrombotic work-up in process: Labs requested on 8/7/2021  · Antiphospholipid panel  · Beta-2 glycoprotein's   · Cardiolipin antibodies   · Protein C Activity  · Protein S antigen  · Antithrombin antibody  · Lupus anticoagulant  · Factor V Leiden  · Prothrombin gene  · MTHFR  · Homocystine level    Plan to request JAK2 with reflex in the outpatient setting    #2  New diagnosis of ductal carcinoma in situ of the left breast, ER 4%, stage 0. Initiated on preventive tamoxifen by Dr. Don Kathleen on 6/21/2021  S/P simple left breast mastectomy by Dr. Don Kathleen on 7/27/2021   Left breast expander and CARRIE drain in place-managed by Dr. Don Kathleen    Reports last menstrual cycle March 2021. Recommend stopping Tamoxifen due to thrombotic risk factor  The issue of preventive therapy will be addressed after completion of left breast reconstruction. At that time, if declared postmenopausal will consider initiating preventative Femara. Dr. Chante Gomes discussed all of the above with Dr. Don Kathleen. Tamoxifen discontinued, last dose 8/5/2021    Thank you for the consultation and opportunity to participate in Ms. Navdeep Rojo of care.       Elizabeth Santoyo, ENRIQUE  11:24 AM  8/7/2021

## 2021-08-07 NOTE — CONSULTS
Pt Name: Ashley Hogan  MRN: [id number]  [place]: 1970  Date of evaluation: 8/7/2021        REASON FOR CONSULTATION: New diagnosis of pulmonary emboli and recent diagnosis of ductal carcinoma in situ of left breast     REQUESTING PHYSICIAN:Fran Quintana MD     ATTENDING/ADMITTING PHYSICIAN:Fran Watson MD     CHIEF COMPLAINT:    Chief Complaint   Patient presents with    Other     possible DVT, Dr. Ochoa Aw sent to be checked       History Obtained From:  History obtained from Berkley [place], her , discussion with her nurse Atif Mann RN in discussion with Dr. Renata Black. HISTORY OF PRESENT ILLNESS:    Mirta Wallace is a very pleasant 68-year-old  female admitted to [facility] to the ED on 8/6/2021 with bilateral pulmonary emboli. Mirta Wallace presented to [address] [place] [address] ED with right-sided sharp pleuritic chest discomfort over the previous 12-24 hours. The pain was a stabbing knifelike sensation. CTA of the chest with contrast on 8/6/2021  · Small to moderate amount of pulmonary embolus greatest in the right lower lobe pulmonary artery branches. There is bilateral pulmonary embolus. · No definite CT evidence of right heart strain. · Mild cardiomegaly. · Focal parenchymal consolidation in the right lower lobe with surrounding groundglass opacity in the same distribution as thegreatest amount of pulmonary embolus. This may represent a pulmonary infarct.     PT 14.0/INR 1.06 and aPTT 21.3 on 8/6/2021  COVID-19, rapid negative on 8/6/2021     Mirta Wallace was placed on on heparin drip and admitted for further evaluation and recommendations. Oncology consultation requested. Mirta Wallace had a diagnosis of left breast DCIS made on on needle biopsy 4/30/2021 at [facility]. This was again confirmed on biopsy at [facility] on 6/2/2021, ER: 94%. Mirta Wallace was placed on tamoxifen by Dr. Nidia Jain on 6/21/2021.     Mirta Wallace underwent left skin and nipple sparing mastectomy with placement of tissue expander on 7/27/2021. PATRICIA is anticipating left breast abdominal flap reconstructive surgery in the near future. 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   [facility] A Pap smear was completed and she was incidentally noted to have an elevated blood pressure and placed on lisinopril with recommendations for close monitoring of BP. Orders were also given for routine screening arrangements were made for bilateral mammogram.     Bilateral mammogram at [facility] on 4/19/2021  · Left breast with multiple groups of calcifications, which have increased in size and number compared to 07/14/2014. · No mammographic evidence of malignancy in the right breast.      Left mammogram with CAD at [facility] on 4/23/2021  · Multiple similar appearing groups of indeterminate calcifications in   the left breast. Recommend stereotactic left breast biopsy.      Ultrasound-guided left breast biopsy at [facility] on 4/30/2021   · Pathology indicating atypical ductal hyperplasia. · Tissue was sent to Dr. Azalea Colon for consultation and she reported \"the changes are concerning for low-grade ductal carcinoma in situ, but are insufficiently developed to establish the diagnosis and the sample.  Final classification should be made on excisional biopsy specimen\".     MRI of bilateral breast on 5/26/2021 at [facility]  · Large 8.5 x 3.3 x 5.4 cm area of regional non-mass enhancement in the LEFT upper outer breast, with differential diagnosis including DCIS. · No suspicious focal mass in either breast. No focal mass adjacentto the LEFT breast biopsy clip marker.   · No axillary or internal mammary lymphadenopathy        Mirta Wallace was seen in initial consultation by KATHLEEN Zamora on 2021 followed by an appointment with Dr. Don Kathleen on 2021.     Left breast biopsy on 2021 by Dr. Don Kathleen  · Micropapillary ductal carcinoma in situ. · Negative for invasion  · ER is positive at 94%.      Seen by Dr. Don Kathleen on 2021 and initiated on preventive tamoxifen 20 mg PO.       Left nipple sparing mastectomy with immediate tissue expander reconstruction on 2021 by Dr. Don Kathleen  Pathology:   1.  Extensive low-grade ductal carcinoma in situ (micropapillary and cribriform types). 2.  In situ carcinoma demonstrates extensive intraluminal mucin production. 3.  In situ carcinoma measures at least 8.0 cm in greatest dimension. 4.  In situ carcinoma extends to within 0.1 cm of the deep surgical excision margin and 0.4 cm of the anterior margin. 5.  Negative for invasive carcinoma.    AJCC STAGE: pTis, pNx, pMx         Treatment summary:  · Initiated on preventative tamoxifen 20 mg on 2021  · Left nipple sparing mastectomy with immediate tissue expander reconstruction on 2021 by Dr. Don Kathleen  · Preventative tamoxifen held on 2021                Past Medical History:    Past Medical History:   Diagnosis Date    Cancer [facility])     DCIS     delivery delivered     Ductal carcinoma in situ (DCIS) of left breast 2021    History of D&C     1993    Hypertension          Past Surgical History:    Past Surgical History:   Procedure Laterality Date     SECTION      DILATION AND CURETTAGE OF UTERUS      X2    MASTECTOMY Left 2021    LEFT NIPPLE SPARING MASTECTOMY VIA WISE PATTERN, TISSUE EXPANDER RECONSTRUCTION, PEC BLOCK performed by Jose F Villalta MD at [facility] [address] LEFT Left 2021     BREAST NEEDLE BIOPSY LEFT 2021 [facility]         Immunizations:    Immunization History   Administered Date(s) Administered    COVID-19, Moderna, PF, 100mcg/0.5mL 01/28/2021, 03/01/2021         Current Hospital Medications:    Current Facility-Administered Medications   Medication Dose Route Frequency Provider Last Rate Last Admin    heparin (porcine) injection 6,530 Units  80 Units/kg Intravenous PRN Ajay Marti MD        heparin (porcine) injection 3,260 Units  40 Units/kg Intravenous PRN Ajay Marti MD        heparin 25,000 units in dextrose 5% 250 mL (premix) infusion  408-2,100 Units/hr Intravenous Continuous Ajay Marti MD 14.7 mL/hr at 08/07/21 0931 18 Units/kg/hr at 08/07/21 0931    cefTRIAXone (ROCEPHIN) 1,000 mg in sodium chloride (PF) 10 mL IV syringe  1,000 mg Intravenous Q24H Ajay Marti MD   1,000 mg at 08/06/21 2102    azithromycin (ZITHROMAX) 500 mg in D5W 250ml Vial Mate  500 mg Intravenous Q24H Ajay Marti MD   Stopped at 08/06/21 2201    sodium chloride flush 0.9 % injection 10 mL  10 mL Intravenous 2 times per day Ajay Marti MD        sodium chloride flush 0.9 % injection 10 mL  10 mL Intravenous PRN Ajay Marti MD        0.9 % sodium chloride infusion  25 mL Intravenous PRN Ajay Marti MD        potassium chloride (KLOR-CON M) extended release tablet 40 mEq  40 mEq Oral PRN Ajay Marti MD        Or    potassium bicarb-citric acid (EFFER-K) effervescent tablet 40 mEq  40 mEq Oral PRN Ajay Marti MD        Or    potassium chloride 10 mEq/100 mL IVPB (Peripheral Line)  10 mEq Intravenous PRN Ajay Marti MD        ondansetron (ZOFRAN-ODT) disintegrating tablet 4 mg  4 mg Oral Q8H PRN Ajay Marti MD        Or    ondansetron [facility]) injection 4 mg  4 mg Intravenous Q6H PRN Ajay Marti MD        magnesium hydroxide (MILK OF MAGNESIA) 400 MG/5ML suspension 30 mL  30 mL Oral Daily PRN Ajay Marti MD        acetaminophen (TYLENOL) tablet 650 mg  650 mg Oral Q6H PRN Ajay Marti MD   650 mg at 08/07/21 6446 sore throat. Eyes: Negative for photophobia, pain, discharge, redness and visual disturbance. Respiratory: Right-sided sharp chest discomfort and pain  Cardiovascular: Negative for chest pain, palpitations or leg swelling. Gastrointestinal: Negative for abdominal pain, blood in stool, constipation, diarrhea, nausea or vomiting. Genitourinary: Negative for dysuria, flank pain, frequency, hematuria or urgency. Musculoskeletal: Negative for back pain, joint swelling, myalgias or neck pain. Skin: Negative for rash or petechiae. Neurological: Negative for tremors, seizures, syncope, weakness or headaches. Hematological: No active bruising or bleeding. Psychiatric/Behavioral: Negative for hallucinations. Objective:  Vitals:    Vitals:    08/07/21 1117   BP: 108/71   Pulse: 92   Resp: 16   Temp: 97.8 °F (36.6 °C)   SpO2: 97%       Physical Exam   Constitutional: Oriented to person, place, and time. No acute distress. Head: Normocephalic and atraumatic. Nose: Nose normal.   Mouth/Throat: Oropharynx is clear and moist. No oropharyngeal exudate. Eyes: Pupils are equal and round. Conjunctivae and EOM are normal. No scleral icterus. Neck: Normal range of motion. Neck supple. No JVD. No appreciable thyromegaly. Cardiovascular: Normal rate, regular rhythm, normal heart sounds and intact distal pulses. Exam reveals no gallop, murmurs or friction rub. Pulmonary/Chest: Effort normal and breath sounds normal. No respiratory distress. No wheezes. Right-sided sharp chest discomfort and pain  Abdominal: Soft. Bowel sounds are normal. No organomegally or masses. No tenderness. There is no rebound and no guarding. Musculoskeletal: Normal range of motion. No edema or tenderness. Lymphadenopathy: No cervical, axillary or inguinal lymphadenopathy. Neurological: Alert and oriented to person, place, and time. Cranial nerves are intact. Neurological exam is nonfocal  Skin: Skin is warm and dry.  No rash noted. No erythema. No pallor. Psychiatric: Judgment normal.         DATA:    Labs:  General Labs:  CBC:   Lab Results   Component Value Date    WBC 13.5 (H) 08/07/2021    HGB 10.7 (L) 08/07/2021    HCT 32.7 (L) 08/07/2021    MCV 96.2 08/07/2021     08/07/2021       CMP:    Lab Results   Component Value Date     08/07/2021    K 4.1 08/07/2021     08/07/2021    CO2 28 08/07/2021    BUN 12 08/07/2021    CREATININE 0.6 08/07/2021    GLUCOSE 100 08/07/2021    CALCIUM 9.0 08/07/2021    PROT 7.4 08/06/2021    LABALBU 4.0 08/06/2021    BILITOT 0.3 08/06/2021    ALKPHOS 72 08/06/2021    AST 17 08/06/2021    ALT 12 08/06/2021    LABGLOM >60 08/07/2021    GFRAA >59 08/07/2021         30 Day lookback of cultures:    Blood Culture Recent: No results for input(s): BC in the last 720 hours. Gram Stain Recent: No results for input(s): LABGRAM in the last 720 hours. Resp Culture Recent: No results for input(s): CULTRESP in the last 720 hours. Body Fluid Recent : No results for input(s): BFCX in the last 720 hours. MRSA Recent : No results for input(s): [address] [facility] [address] in the last 720 hours. Urine Culture Recent : No results for input(s): LABURIN in the last 720 hours. Organism Recent : No results for input(s): ORG in the last 720 hours. IMPRESSION/RECOMMENDATIONS:    Jim Infante is a very pleasant 51-year-old  female admitted to [facility] to the ED on 8/6/2021 with bilateral pulmonary emboli. Jim Infante presented to [address] [place] [address] ED with right-sided sharp pleuritic chest discomfort over the previous 12-24 hours. The pain was a stabbing knifelike sensation. CTA of the chest with contrast on 8/6/2021  · Small to moderate amount of pulmonary embolus greatest in the right lower lobe pulmonary artery branches. There is bilateral pulmonary embolus. · No definite CT evidence of right heart strain. · Mild cardiomegaly.   · Focal parenchymal consolidation in the right lower lobe with surrounding groundglass opacity in the same distribution as thegreatest amount of pulmonary embolus. This may represent a pulmonary infarct.     PT 14.0/INR 1.06 and aPTT 21.3 on 8/6/2021  COVID-19, rapid negative on 8/6/2021     Frank Toledo was placed on on heparin drip and admitted for further evaluation and recommendations. Oncology consultation requested. Frank Toledo had a diagnosis of left breast DCIS made on on needle biopsy 4/30/2021 at [facility]. This was again confirmed on biopsy at [address] [place] [address] on 6/2/2021, ER: 94%. Frank oTledo was placed on tamoxifen by Dr. Garce Martinez on 6/21/2021. Frank Toledo underwent left skin and nipple sparing mastectomy with placement of tissue expander on 7/27/2021. Frank Toledo is anticipating left breast abdominal flap reconstructive surgery in the near future. #1  Bilateral pulmonary emboli     CTA of the chest with contrast on 8/6/2021  · Small to moderate amount of pulmonary embolus greatest in the right lower lobe pulmonary artery branches. There is bilateral pulmonary embolus. · No definite CT evidence of right heart strain. · Mild cardiomegaly. · Focal parenchymal consolidation in the right lower lobe with surrounding groundglass opacity in the same distribution as thegreatest amount of pulmonary embolus. This may represent a pulmonary infarct.     PT 14.0/INR 1.06 and aPTT 21.3 on 8/6/2021  COVID-19, rapid negative on 8/6/2021      Prothrombotic work-up in process: Labs requested on 8/7/2021  · Antiphospholipid panel  · Beta-2 glycoprotein's   · Cardiolipin antibodies   · Protein C Activity  · Protein S antigen  · Antithrombin antibody  · Lupus anticoagulant  · Factor V Leiden  · Prothrombin gene  · MTHFR  · Homocystine level     Initiated on heparin drip on 8/6/2021. Okay to transition to Eliquis    Recommend initiating Eliquis 10 mg p.o. twice daily x7 days followed by Eliquis 5 mg p.o. twice daily with no stop date     Anticipating noninvasive venous study of bilateral lower extremities later on today.   Discussed with vascular diagnostics. Plan to request JAK2 with reflex in the outpatient setting     Outpatient follow-up has been made to see Bianca Handy in about 2 weeks to follow-up on laboratory testing and in continuity of care regarding all of the above. #2  New diagnosis of ductal carcinoma in situ of the left breast, ER 4%, stage 0. Initiated on preventive tamoxifen by Dr. Evan Sen on 6/21/2021  S/P simple left breast skin and nipple sparing mastectomy with left breast tissue expander and CARRIE drain by Dr. Evan Sen on 7/27/2021      Tamoxifen was started in the preventative setting. This of course is to prevent new disease developments over the future years in the right breast.  Although Bianca Handy will be on anticoagulation, she does have future surgeries scheduled for reconstruction. I would prefer that she be off of tamoxifen which can be a prothrombotic agent until after all of that has been accomplished. Recommend stopping Tamoxifen due to thrombotic risk factor  The issue of preventive therapy will be addressed after completion of left breast reconstruction. At that time, if declared postmenopausal will consider initiating preventative Femara. Reports irregular menstrual cycles and the last menstrual cycle was in March 2021. I discussed all of the above with Dr. Abby Maloney who is in agreement with the plan as outlined.       Tamoxifen discontinued, last dose 8/5/2021      Cheryl Murphy MD    08/07/21  12:35 PM

## 2021-08-07 NOTE — PROGRESS NOTES
Vascular lab preliminary results. Bilateral lower extremity venous duplex scan performed. No evidence of DVT, SVT, or reflux noted at this time. Final report pending.

## 2021-08-07 NOTE — PROGRESS NOTES
[facility]    Patient:  Dread Bell  YOB: 1970  Date of Service: 8/7/2021  MRN: [id number]   Acct: [de-identified]   Primary Care Physician: ENRIQUE Vick CNP  Advance Directive: Full Code  Admit Date: 8/6/2021       Hospital Day: 1        CHIEF COMPLAINT:   Pleuritic chest pain  Chief Complaint   Patient presents with    Other     possible DVT, Dr. Kandice Bridges sent to be checked       8/7/2021 8:54 AM  Subjective / Interval History:   08/07/2021  Seen and examined. Continues to report some right-sided pleuritic chest pain, although improved. Laying comfortably in bed in no acute distress. Denies any acute complaints or distress at this time. Review of Systems:   Review of Systems  ROS: 14 point review of systems is negative except as specifically addressed above. ADULT DIET;  Regular    Intake/Output Summary (Last 24 hours) at 8/7/2021 0854  Last data filed at 8/7/2021 0615  Gross per 24 hour   Intake 774.79 ml   Output 40 ml   Net 734.79 ml       Medications:   heparin (PORCINE) Infusion 408 Units/hr (08/06/21 1554)    sodium chloride       Current Facility-Administered Medications   Medication Dose Route Frequency Provider Last Rate Last Admin    heparin (porcine) injection 6,530 Units  80 Units/kg Intravenous PRN Shira Velazquez MD        heparin (porcine) injection 3,260 Units  40 Units/kg Intravenous PRN Shira Velazquez MD        heparin 25,000 units in dextrose 5% 250 mL (premix) infusion  408-2,100 Units/hr Intravenous Continuous Shira Velazquez MD 4.1 mL/hr at 08/06/21 1554 408 Units/hr at 08/06/21 1554    cefTRIAXone (ROCEPHIN) 1,000 mg in sodium chloride (PF) 10 mL IV syringe  1,000 mg Intravenous Q24H Shira Velazquez MD   1,000 mg at 08/06/21 2102    azithromycin (ZITHROMAX) 500 mg in D5W 250ml Vial Mate  500 mg Intravenous Q24H Shira Velazquez MD   Stopped at 08/06/21 2201    sodium chloride flush 0.9 % injection 10 mL  10 mL Intravenous 2 times per day Gregoria Nix MD        sodium chloride flush 0.9 % injection 10 mL  10 mL Intravenous PRN Gregoria Nix MD        0.9 % sodium chloride infusion  25 mL Intravenous PRN Gregoria Nix MD        potassium chloride (KLOR-CON M) extended release tablet 40 mEq  40 mEq Oral PRN Gregoria Nix MD        Or    potassium bicarb-citric acid (EFFER-K) effervescent tablet 40 mEq  40 mEq Oral PRN Gregoria Nix MD        Or    potassium chloride 10 mEq/100 mL IVPB (Peripheral Line)  10 mEq Intravenous PRN Gregoria Nix MD        ondansetron (ZOFRAN-ODT) disintegrating tablet 4 mg  4 mg Oral Q8H PRN Gregoria Nix MD        Or    ondansetron [facility]) injection 4 mg  4 mg Intravenous Q6H PRN Gregoria Nix MD        magnesium hydroxide (MILK OF MAGNESIA) 400 MG/5ML suspension 30 mL  30 mL Oral Daily PRN Gregoria Nix MD        acetaminophen (TYLENOL) tablet 650 mg  650 mg Oral Q6H PRN Gregoria Nix MD   650 mg at 08/06/21 2103    Or    acetaminophen (TYLENOL) suppository 650 mg  650 mg Rectal Q6H PRN Gregoria Nix MD             heparin (PORCINE) Infusion 408 Units/hr (08/06/21 1554)    sodium chloride        cefTRIAXone (ROCEPHIN) IV  1,000 mg Intravenous Q24H    azithromycin  500 mg Intravenous Q24H    sodium chloride flush  10 mL Intravenous 2 times per day     heparin (porcine), heparin (porcine), sodium chloride flush, sodium chloride, potassium chloride **OR** potassium alternative oral replacement **OR** potassium chloride, ondansetron **OR** ondansetron, magnesium hydroxide, acetaminophen **OR** acetaminophen  ADULT DIET;  Regular       Labs:   CBC with DIFF:  Recent Labs     08/06/21  1416 08/07/21  0351   WBC 15.4* 13.5*   RBC 3.36* 3.40*   HGB 10.8* 10.7*   HCT 32.5* 32.7*   MCV 96.7 96.2   MCH 32.1* 31.5*   MCHC 33.2 32.7*   RDW 13.2 13.2    300   MPV 9.6 9.7   NEUTOPHILPCT 74.1* 69.0*   LYMPHOPCT 11.3* 15.2*   MONOPCT 8.9 8.9   EOSRELPCT 4.9 6.1*   BASOPCT 0.4 0.3   NEUTROABS 11.4* 9.3*   LYMPHSABS 1.7 2.1   MONOSABS 1.40* 1.20*   EOSABS 0.80* 0.80*   BASOSABS 0.10 0.00       CMP/BMP:  Recent Labs     08/06/21  1416 08/07/21  0351    137   K 4.2 4.1    100   CO2 22 28   ANIONGAP 11 9   GLUCOSE 96 100   BUN 12 12   CREATININE 0.5 0.6   LABGLOM >60 >60   CALCIUM 9.0 9.0   PROT 7.4  --    LABALBU 4.0  --    BILITOT 0.3  --    ALKPHOS 72  --    ALT 12  --    AST 17  --          CRP:    Recent Labs     08/06/21 2136   CRP 6.96*     Sed Rate:    Recent Labs     08/06/21 2136   SEDRATE 37*         HgBA1c:  No components found for: HGBA1C  FLP:    Lab Results   Component Value Date    TRIG 68 04/13/2021    HDL 55 04/13/2021    LDLCALC 132 04/13/2021     TSH:    Lab Results   Component Value Date    TSH 2.150 04/13/2021     Troponin T:   Recent Labs     08/06/21 1416   TROPONINI <0.01     Pro-BNP: No results for input(s): BNP in the last 72 hours. INR:   Recent Labs     08/06/21  1542   INR 1.06     ABGs: No results found for: PHART, PO2ART, AEN3VXY  UA:No results for input(s): NITRITE, COLORU, PHUR, LABCAST, WBCUA, RBCUA, MUCUS, TRICHOMONAS, YEAST, BACTERIA, CLARITYU, SPECGRAV, LEUKOCYTESUR, UROBILINOGEN, BILIRUBINUR, BLOODU, GLUCOSEU, AMORPHOUS in the last 72 hours. Invalid input(s): Irven Guess      Culture Results:    No results for input(s): CXSURG in the last 720 hours. Blood Culture Recent: No results for input(s): BC in the last 720 hours. No results for input(s): BC, BLOODCULT2, ORG in the last 72 hours. Cultures:   No results for input(s): CULTURE in the last 72 hours. No results for input(s): BC, Elda Cava in the last 72 hours. No results for input(s): CXSURG in the last 72 hours. No results for input(s): MG, PHOS in the last 72 hours.   Recent Labs     08/06/21  1416   AST 17   ALT 12   BILITOT 0.3   ALKPHOS 72         RAD:   CTA PULMONARY W CONTRAST    Result Date: 8/6/2021  EXAMINATION:  CTA PULMONARY W CONTRAST  8/6/2021 4:25 PM HISTORY: Right chest pain. Left mastectomy 10 days ago. There is clinical concern for pulmonary embolus. COMPARISON: No comparison study. DLP: 456 mGy-cm. Automated exposure control was utilized. TECHNIQUE: Spiral CT angiography was performed of the chest with contrast. Sagittal, coronal and 3-D images were reconstructed. MEDIASTINUM/VASCULAR: There is pulmonary embolus bilaterally. There is a greater amount of pulmonary embolus in right lower lobe pulmonary artery branches. The right heart chambers do not appear to be dilated compared to the left. The main pulmonary artery segment measures 2.7 cm. There is no reflux of contrast into the inferior vena cava. The thoracic aorta is normal caliber. There is mild cardiomegaly. LUNGS: There is focal parenchymal consolidation in the right lower lobe with surrounding groundglass opacity. This is in the same distribution as the greatest amount of pulmonary embolus. Therefore, this could represent an area of pulmonary infarction. There is mild left lower lobe atelectasis. UPPER ABDOMEN: There is no significant abnormality identified in the upper abdomen. BONES/SOFT TISSUES/: No acute appearing bony abnormality is seen. There has been recent left breast surgery with mastectomy and reconstruction with a soft tissue expander. There is some edema surrounding the tissue expander. There is air density within the expander. There is a surgical drain extending around the soft tissue expander. 1. Small to moderate amount of pulmonary embolus greatest in the right lower lobe pulmonary artery branches. There is bilateral pulmonary embolus. 2. No definite CT evidence of right heart strain. There is mild cardiomegaly. 3. Focal parenchymal consolidation in the right lower lobe with surrounding groundglass opacity in the same distribution as the greatest amount of pulmonary embolus. This may represent a pulmonary infarct. 4. Postoperative changes of the left breast, as described. The pulmonary embolus result was discussed with Dr. Shira Scott by Dr. Emmanuel Hightower around 3:28 PM on 8/6/2021. Signed by Dr Carr Valdese Date: 8/2/2021  INDICATIONS: Preoperative marking for surgical planning TECHNIQUE: Targeted ultrasound was done of the upper outer quadrant of the left breast. Multiple longitudinal and transverse sonographic images were obtained and reviewed in real-time with the technologist. The clip was seen at the 2:00 position 6 cm from the nipple. The skin was marked with an indelible marker to facilitate surgical planning. Well-visualized clip left breast RECOMMENDATIONS: Proceed with surgery when clinically indicated Signed by Dr Mumtaz Singh        Objective:   Vitals:   /83   Pulse 90   Temp 97.6 °F (36.4 °C) (Temporal)   Resp 16   Ht 5' 5\" (1.651 m)   Wt 180 lb (81.6 kg)   SpO2 98%   BMI 29.95 kg/m²       Patient Vitals for the past 24 hrs:   BP Temp Temp src Pulse Resp SpO2 Height Weight   08/07/21 0752 118/83 97.6 °F (36.4 °C) Temporal 90 16 98 % -- --   08/07/21 0324 105/63 97.8 °F (36.6 °C) Temporal 85 18 96 % -- --   08/06/21 1945 (!) 129/91 97.8 °F (36.6 °C) Temporal 89 16 98 % -- --   08/06/21 1715 119/78 -- -- 89 -- 98 % -- --   08/06/21 1700 122/81 -- -- 86 -- 99 % -- --   08/06/21 1318 131/89 98.7 °F (37.1 °C) Oral 93 18 95 % 5' 5\" (1.651 m) 180 lb (81.6 kg)       24HR INTAKE/OUTPUT:      Intake/Output Summary (Last 24 hours) at 8/7/2021 0854  Last data filed at 8/7/2021 0615  Gross per 24 hour   Intake 774.79 ml   Output 40 ml   Net 734.79 ml       Physical Exam  Vitals and nursing note reviewed. Constitutional:       General: She is not in acute distress. Appearance: Normal appearance. She is not ill-appearing, toxic-appearing or diaphoretic. HENT:      Head: Normocephalic and atraumatic.       Right Ear: External ear normal.      Left Ear: External ear normal. Nose: Nose normal. No congestion or rhinorrhea. Mouth/Throat:      Mouth: Mucous membranes are moist.      Pharynx: Oropharynx is clear. No oropharyngeal exudate or posterior oropharyngeal erythema. Eyes:      General: No scleral icterus. Right eye: No discharge. Left eye: No discharge. Extraocular Movements: Extraocular movements intact. Conjunctiva/sclera: Conjunctivae normal.      Pupils: Pupils are equal, round, and reactive to light. Neck:      Vascular: No carotid bruit. Cardiovascular:      Rate and Rhythm: Normal rate and regular rhythm. Pulses: Normal pulses. Heart sounds: Normal heart sounds. No murmur heard. No friction rub. No gallop. Pulmonary:      Effort: Pulmonary effort is normal. No respiratory distress. Breath sounds: Normal breath sounds. No stridor. No wheezing, rhonchi or rales. Chest:      Chest wall: No tenderness. Abdominal:      General: Bowel sounds are normal. There is no distension. Palpations: Abdomen is soft. Tenderness: There is no abdominal tenderness. There is no guarding or rebound. Musculoskeletal:         General: No swelling, tenderness, deformity or signs of injury. Normal range of motion. Cervical back: Normal range of motion and neck supple. No rigidity. No muscular tenderness. Right lower leg: No edema. Left lower leg: No edema. Skin:     General: Skin is warm and dry. Capillary Refill: Capillary refill takes less than 2 seconds. Coloration: Skin is not jaundiced or pale. Findings: No bruising, erythema, lesion or rash. Neurological:      General: No focal deficit present. Mental Status: She is alert and oriented to person, place, and time. Cranial Nerves: No cranial nerve deficit. Sensory: No sensory deficit. Motor: No weakness.       Coordination: Coordination normal.   Psychiatric:         Mood and Affect: Mood normal.         Behavior: Behavior normal.         Thought Content: Thought content normal.         Judgment: Judgment normal.           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 *      Brief Summary  Ms. Jimbo Escobedo, a 54 y.o. female with a history of HTN, breast cancer (status post mastectomy-07/27/2021), presented to [facility] ED (08/06/2021), on account of chronic acute onset of severe sharp/knifelike left-sided pleuritic chest pain/discomfort, which started prior to going to bed last night. Pain worsens with deep inspiration.     Pain and discomfort has continued to persist upon awakening.     CTA chest (08/06/2021): Bilateral pulmonary embolus. Focal parenchymal consolidation in the right lower lobe with surrounding groundglass opacity in the same distribution as the greatest amount of pulmonary embolus.      Heparin infusion initiated  Patient admitted for further work-up and management.         Unprovoked Acute Bilateral PE  · CTA chest (08/06/2021): Impression: Small to moderate amount of pulmonary embolus greatest in the right lower lobe pulmonary artery branches. There is bilateral pulmonary embolus. No definite CT evidence of right heart strain. There is mild cardiomegaly. Focal parenchymal consolidation in the right lower lobe with surrounding groundglass opacity in the same distribution as the greatest amount of pulmonary embolus.  This may represent a pulmonary infarct. Postoperative changes of the left breast, as described. · Heparin ggt initiated in the ED  · Consider switching to Apixaban  · Bilateral lower extremity duplex ultrasound (08/07/2021): Pending  · Consider hematology consult, for further work-up and to establish care for likely outpatient follow-up     ? Pneumonia, due to unspecified organism. · -Afebrile  ·  Leukocytosis (WBC: 15.4) - 08/06/2021  · Leukocytosis improving  · CTA chest (08/06/2021): Focal parenchymal consolidation in the right lower lobe with surrounding groundglass opacity  · Procalcitonin within lab reference range: 0.05  · Elevated CRP: 6.96 (08/06/2021)  · Elevated ESR  · - Blood culture pending  · - Urine legionella antigen  · - Urine strep antigen  · - Start Ceftriaxone and Azithromycin  · - Oxygen supplementation to keep SPO2 > 92%  · - Bronchodilators as needed  · - Dextromethorphan/Guaifenesin for symptomatic relief  · - Continue to monitor       History of breast cancer  · Status post mastectomy (07/27/2021)  · General surgery consulted from ED           Continue management of other chronic medical conditions - see above and orders. Advance Directive: Full Code    ADULT DIET; Regular         Consults Made:   IP CONSULT TO GENERAL SURGERY  IP CONSULT TO SOCIAL WORK  IP CONSULT TO ONCOLOGY    DVT prophylaxis: Heparin      Discharge planning: tbd    Time Spent is 25 mins in the examination, evaluation, counseling and review of medications, assessment and plan.      Electronically signed by   Gay Orozco MD, MPH, MD,   Internal Medicine Hospitalist   8/7/2021 8:54 AM

## 2021-08-08 VITALS
SYSTOLIC BLOOD PRESSURE: 119 MMHG | DIASTOLIC BLOOD PRESSURE: 84 MMHG | OXYGEN SATURATION: 99 % | HEIGHT: 65 IN | HEART RATE: 94 BPM | BODY MASS INDEX: 29.99 KG/M2 | TEMPERATURE: 97 F | RESPIRATION RATE: 16 BRPM | WEIGHT: 180 LBS

## 2021-08-08 LAB
ANION GAP SERPL CALCULATED.3IONS-SCNC: 11 MMOL/L (ref 7–19)
APTT: 53.6 SEC (ref 26–36.2)
APTT: 82.1 SEC (ref 26–36.2)
BASOPHILS ABSOLUTE: 0.1 K/UL (ref 0–0.2)
BASOPHILS RELATIVE PERCENT: 0.6 % (ref 0–1)
BUN BLDV-MCNC: 10 MG/DL (ref 6–20)
CALCIUM SERPL-MCNC: 8.7 MG/DL (ref 8.6–10)
CHLORIDE BLD-SCNC: 106 MMOL/L (ref 98–111)
CO2: 24 MMOL/L (ref 22–29)
CREAT SERPL-MCNC: 0.6 MG/DL (ref 0.5–0.9)
EOSINOPHILS ABSOLUTE: 0.8 K/UL (ref 0–0.6)
EOSINOPHILS RELATIVE PERCENT: 6.5 % (ref 0–5)
GFR AFRICAN AMERICAN: >59
GFR NON-AFRICAN AMERICAN: >60
GLUCOSE BLD-MCNC: 109 MG/DL (ref 74–109)
HCT VFR BLD CALC: 30.7 % (ref 37–47)
HEMOGLOBIN: 10.2 G/DL (ref 12–16)
IMMATURE GRANULOCYTES #: 0.1 K/UL
LYMPHOCYTES ABSOLUTE: 1.9 K/UL (ref 1.1–4.5)
LYMPHOCYTES RELATIVE PERCENT: 15.3 % (ref 20–40)
MCH RBC QN AUTO: 32.2 PG (ref 27–31)
MCHC RBC AUTO-ENTMCNC: 33.2 G/DL (ref 33–37)
MCV RBC AUTO: 96.8 FL (ref 81–99)
MONOCYTES ABSOLUTE: 1.2 K/UL (ref 0–0.9)
MONOCYTES RELATIVE PERCENT: 9.5 % (ref 0–10)
NEUTROPHILS ABSOLUTE: 8.6 K/UL (ref 1.5–7.5)
NEUTROPHILS RELATIVE PERCENT: 67.6 % (ref 50–65)
PDW BLD-RTO: 13.1 % (ref 11.5–14.5)
PLATELET # BLD: 281 K/UL (ref 130–400)
PMV BLD AUTO: 10 FL (ref 9.4–12.3)
POTASSIUM REFLEX MAGNESIUM: 4.4 MMOL/L (ref 3.5–5)
RBC # BLD: 3.17 M/UL (ref 4.2–5.4)
SODIUM BLD-SCNC: 141 MMOL/L (ref 136–145)
WBC # BLD: 12.6 K/UL (ref 4.8–10.8)

## 2021-08-08 PROCEDURE — 99233 SBSQ HOSP IP/OBS HIGH 50: CPT | Performed by: INTERNAL MEDICINE

## 2021-08-08 PROCEDURE — 6370000000 HC RX 637 (ALT 250 FOR IP): Performed by: INTERNAL MEDICINE

## 2021-08-08 PROCEDURE — 80048 BASIC METABOLIC PNL TOTAL CA: CPT

## 2021-08-08 PROCEDURE — 85025 COMPLETE CBC W/AUTO DIFF WBC: CPT

## 2021-08-08 PROCEDURE — 36415 COLL VENOUS BLD VENIPUNCTURE: CPT

## 2021-08-08 PROCEDURE — 99024 POSTOP FOLLOW-UP VISIT: CPT | Performed by: SURGERY

## 2021-08-08 PROCEDURE — 85730 THROMBOPLASTIN TIME PARTIAL: CPT

## 2021-08-08 RX ORDER — CEFDINIR 300 MG/1
300 CAPSULE ORAL 2 TIMES DAILY
Qty: 10 CAPSULE | Refills: 0 | Status: SHIPPED | OUTPATIENT
Start: 2021-08-08 | End: 2021-08-13

## 2021-08-08 RX ADMIN — APIXABAN 10 MG: 5 TABLET, FILM COATED ORAL at 11:05

## 2021-08-08 NOTE — PROGRESS NOTES
IV removed. DC instructions reviewed with patient. She verbalizes understanding. Coupon for elequis given to patient.

## 2021-08-08 NOTE — CARE COORDINATION
Pt eliquis cost was around $700 with [facility] pharmacy. SW called in with 30 day coupon and spoke with pharmacy staff. 30 day coupon worked and Pt has no co-pay for the medication. Pt and nurse have been updated.    Electronically signed by Donald Ramirez on 8/8/2021 at 10:54 AM

## 2021-08-08 NOTE — PROGRESS NOTES
Patient name: Lala Copeland  Patient : 1970  9:15 AM  ROOM 527    Portions of this note have been copied forward, however, changed to reflect the most current clinical status of this patient. Subjective: Denies any shortness of breath or chest pain. Eager to be discharged home. No new complaints or concerns. HISTORY OF PRESENT ILLNESS:   [facility] is a very pleasant 53-year-old  female admitted to [facility] to the ED on 2021 with bilateral pulmonary emboli. [facility] presented to [address] [place] [address] ED with right-sided sharp pleuritic chest discomfort over the previous 12-24 hours. The pain was a stabbing knifelike sensation.     CTA of the chest with contrast on 2021  · Small to moderate amount of pulmonary embolus greatest in the right lower lobe pulmonary artery branches. There is bilateral pulmonary embolus. · No definite CT evidence of right heart strain.   · Mild cardiomegaly. · Focal parenchymal consolidation in the right lower lobe with surrounding groundglass opacity in the same distribution as thegreatest amount of pulmonary embolus. This may represent a pulmonary infarct.     PT 14.0/INR 1.06 and aPTT 21.3 on 2021  COVID-19, rapid negative on 2021     [facility] was placed on on heparin drip and admitted for further evaluation and recommendations. Oncology consultation requested.         [facility] had a diagnosis of left breast DCIS made on on needle biopsy 2021 at [facility]". This was again confirmed on biopsy at [facility] on 2021, ER: 94%. [facility] was placed on tamoxifen by Dr. Iliana Brody on 2021.     [facility] underwent left skin and nipple sparing mastectomy with placement of tissue expander on 2021.   [facility] is anticipating left breast abdominal flap reconstructive surgery in the near future.        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   Mely Guadalupe [facility]  A Pap smear was completed and she was incidentally noted to have an elevated blood pressure and placed on lisinopril with recommendations for close monitoring of BP.  Orders were also given for routine screening arrangements were made for bilateral mammogram.     Bilateral mammogram at [facility] on 4/19/2021  · Left breast with multiple groups of calcifications, which have increased in size and number compared to 07/14/2014. · No mammographic evidence of malignancy in the right breast.      Left mammogram with CAD at [facility] on 4/23/2021  · Multiple similar appearing groups of indeterminate calcifications in   the left breast. Recommend stereotactic left breast biopsy.      Ultrasound-guided left breast biopsy at [facility] on 4/30/2021   · Pathology indicating atypical ductal hyperplasia.    · Tissue was sent to Dr. Jennifer Acosta for consultation and she reported \"the changes are concerning for low-grade ductal carcinoma in situ, but are insufficiently developed to establish the diagnosis and the sample.  Final classification should be made on excisional biopsy specimen\".     MRI of bilateral breast on 5/26/2021 at [facility]  · Large 8.5 x 3.3 x 5.4 cm area of regional non-mass enhancement in the LEFT upper outer breast, with differential diagnosis including DCIS. · No suspicious focal mass in either breast. No focal mass adjacentto the LEFT breast biopsy clip marker.   · No axillary or internal mammary lymphadenopathy        Mely Guadalupe was seen in initial consultation by KATHLEEN Aguilar on 5/21/2021 followed by an appointment with Dr. Grady Lugo on 5/27/2021.     Left breast biopsy on 6/2/2021 by Dr. Grady Lugo  · Micropapillary ductal carcinoma in situ.   · Negative for invasion  · ER is positive at 94%.       Seen by Dr. Thien Duron on 6/21/2021 and initiated on preventive tamoxifen 20 mg PO.       Left nipple sparing mastectomy with immediate tissue expander reconstruction on 7/27/2021 by Dr. Thien Duron  Pathology:   1.  Extensive low-grade ductal carcinoma in situ (micropapillary and cribriform types). 2.  In situ carcinoma demonstrates extensive intraluminal mucin production. 3.  In situ carcinoma measures at least 8.0 cm in greatest dimension. 4.  In situ carcinoma extends to within 0.1 cm of the deep surgical excision margin and 0.4 cm of the anterior margin. 5.  Negative for invasive carcinoma. AJCC STAGE: pTis, pNx, pMx         Treatment summary:  · Initiated on preventative tamoxifen 20 mg on 6/21/2021  · Left nipple sparing mastectomy with immediate tissue expander reconstruction on 7/27/2021 by Dr. Thien Duron  · Preventative tamoxifen held on 8/7/2021           Current Pain Assessment  Pain Assessment  Pain Assessment: 0-10  Pain Level: 5  Patient's Stated Pain Goal: No pain  Pain Type: Acute pain  Pain Location: Chest  Pain Orientation: Right    OBJECTIVE:  VITALS: BP (!) 131/93   Pulse 79   Temp 96.9 °F (36.1 °C) (Temporal)   Resp 16   Ht 5' 5\" (1.651 m)   Wt 180 lb (81.6 kg)   SpO2 99%   BMI 29.95 kg/m²   I&O:   Intake/Output Summary (Last 24 hours) at 8/8/2021 0915  Last data filed at 8/7/2021 2042  Gross per 24 hour   Intake 240 ml   Output 10 ml   Net 230 ml         Physical Exam  Vitals reviewed. Constitutional:       General: She is not in acute distress. Appearance: She is well-developed. She is not diaphoretic. HENT:      Head: Normocephalic and atraumatic. Mouth/Throat:      Pharynx: Uvula midline. Tonsils: No tonsillar exudate. Eyes:      General: Lids are normal. No scleral icterus. Right eye: No discharge. Left eye: No discharge.       Conjunctiva/sclera: Conjunctivae normal.      Pupils: Pupils are equal, round, and reactive to light. Neck:      Thyroid: No thyroid mass or thyromegaly. Vascular: No JVD. Trachea: Trachea normal. No tracheal deviation. Cardiovascular:      Rate and Rhythm: Normal rate and regular rhythm. Heart sounds: Normal heart sounds. No murmur heard. No friction rub. No gallop. Pulmonary:      Effort: Pulmonary effort is normal. No respiratory distress. Breath sounds: Normal breath sounds. No wheezing or rales. Chest:      Chest wall: No tenderness. Abdominal:      General: Bowel sounds are normal. There is no distension. Palpations: Abdomen is soft. There is no mass. Tenderness: There is no abdominal tenderness. There is no guarding or rebound. Hernia: No hernia is present. Musculoskeletal:         General: No tenderness or deformity. Cervical back: Normal range of motion and neck supple. Comments: Range of motion within normal limits x4 extremities   Skin:     General: Skin is warm. Coloration: Skin is not pale. Findings: No erythema or rash. Comments: CARRIE drain to left chest wall   Neurological:      Mental Status: She is alert and oriented to person, place, and time. Cranial Nerves: No cranial nerve deficit. Coordination: Coordination normal.   Psychiatric:         Behavior: Behavior normal.         Thought Content:  Thought content normal.         BMP:   Recent Labs     08/07/21  0351 08/08/21  0318    141   K 4.1 4.4    106   CO2 28 24   BUN 12 10   CREATININE 0.6 0.6   GLUCOSE 100 109   CALCIUM 9.0 8.7     Recent Labs     08/08/21  0318 08/07/21  0351 08/06/21  1416   WBC 12.6* 13.5* 15.4*   HGB 10.2* 10.7* 10.8*   HCT 30.7* 32.7* 32.5*   MCV 96.8 96.2 96.7    300 268     CMP:    Recent Labs     08/06/21  1416 08/06/21  1416 08/07/21  0351 08/08/21  0318      < > 137 141   K 4.2  --  4.1 4.4      < > 100 106   CO2 22   < > 28 24   BUN 12   < > 12 10   CREATININE 0.5   < > 0.6 0.6   GFRAA >59   < > >59 >59   LABGLOM >60   < > >60 >60   GLUCOSE 96   < > 100 109   PROT 7.4  --   --   --    LABALBU 4.0  --   --   --    CALCIUM 9.0   < > 9.0 8.7   BILITOT 0.3  --   --   --    ALKPHOS 72  --   --   --    AST 17  --   --   --    ALT 12  --   --   --     < > = values in this interval not displayed. 30Day lookback of cultures:    Blood Culture Recent:   Recent Labs     08/06/21  2137   BC No Growth to date. Any change in status will be called. Gram Stain Recent: No results for input(s): LABGRAM in the last 720 hours. Resp Culture Recent: No results for input(s): CULTRESP in the last 720 hours. Body Fluid Recent : No results for input(s): BFCX in the last 720 hours. MRSA Recent : No results for input(s): [address] [facility] [address] in the last 720 hours. Urine Culture Recent : No results for input(s): LABURIN in the last 720 hours. Organism Recent : No results for input(s): ORG in the last 720 hours. Radiology:   CTA PULMONARY W CONTRAST    Result Date: 8/6/2021  EXAMINATION:  CTA PULMONARY W CONTRAST  8/6/2021 4:25 PM HISTORY: Right chest pain. Left mastectomy 10 days ago. There is clinical concern for pulmonary embolus. COMPARISON: No comparison study. DLP: 456 mGy-cm. Automated exposure control was utilized. TECHNIQUE: Spiral CT angiography was performed of the chest with contrast. Sagittal, coronal and 3-D images were reconstructed. MEDIASTINUM/VASCULAR: There is pulmonary embolus bilaterally. There is a greater amount of pulmonary embolus in right lower lobe pulmonary artery branches. The right heart chambers do not appear to be dilated compared to the left. The main pulmonary artery segment measures 2.7 cm. There is no reflux of contrast into the inferior vena cava. The thoracic aorta is normal caliber. There is mild cardiomegaly. LUNGS: There is focal parenchymal consolidation in the right lower lobe with surrounding groundglass opacity.  This is in the same distribution as the greatest amount of pulmonary embolus. Therefore, this could represent an area of pulmonary infarction. There is mild left lower lobe atelectasis. UPPER ABDOMEN: There is no significant abnormality identified in the upper abdomen. BONES/SOFT TISSUES/: No acute appearing bony abnormality is seen. There has been recent left breast surgery with mastectomy and reconstruction with a soft tissue expander. There is some edema surrounding the tissue expander. There is air density within the expander. There is a surgical drain extending around the soft tissue expander. 1. Small to moderate amount of pulmonary embolus greatest in the right lower lobe pulmonary artery branches. There is bilateral pulmonary embolus. 2. No definite CT evidence of right heart strain. There is mild cardiomegaly. 3. Focal parenchymal consolidation in the right lower lobe with surrounding groundglass opacity in the same distribution as the greatest amount of pulmonary embolus. This may represent a pulmonary infarct. 4. Postoperative changes of the left breast, as described. The pulmonary embolus result was discussed with Dr. Shraddha Carmona by Dr. Nicolette Patton around 3:28 PM on 8/6/2021. Signed by Dr Kendrick Knee Date: 8/2/2021  INDICATIONS: Preoperative marking for surgical planning TECHNIQUE: Targeted ultrasound was done of the upper outer quadrant of the left breast. Multiple longitudinal and transverse sonographic images were obtained and reviewed in real-time with the technologist. The clip was seen at the 2:00 position 6 cm from the nipple. The skin was marked with an indelible marker to facilitate surgical planning.     Well-visualized clip left breast RECOMMENDATIONS: Proceed with surgery when clinically indicated Signed by Dr Danny Menezes      Medications  Current Facility-Administered Medications   Medication Dose Route Frequency Provider Last Rate Last Admin    heparin (porcine) injection 6,530 Units  80 Units/kg Intravenous PRN Kimani Main MD        heparin (porcine) injection 3,260 Units  40 Units/kg Intravenous PRN Kimani Main MD        heparin 25,000 units in dextrose 5% 250 mL (premix) infusion  408-2,100 Units/hr Intravenous Continuous Kimani Main MD 14.7 mL/hr at 08/07/21 0931 18 Units/kg/hr at 08/07/21 0931    cefTRIAXone (ROCEPHIN) 1,000 mg in sodium chloride (PF) 10 mL IV syringe  1,000 mg Intravenous Q24H Kimani Main MD   1,000 mg at 08/07/21 2144    azithromycin (ZITHROMAX) 500 mg in D5W 250ml Vial Mate  500 mg Intravenous Q24H Kimani Main MD   Stopped at 08/07/21 2144    sodium chloride flush 0.9 % injection 10 mL  10 mL Intravenous 2 times per day Kimani Main MD        sodium chloride flush 0.9 % injection 10 mL  10 mL Intravenous PRN Kimani Main MD        0.9 % sodium chloride infusion  25 mL Intravenous PRN Kimani Main MD        potassium chloride (KLOR-CON M) extended release tablet 40 mEq  40 mEq Oral PRN Kimani Main MD        Or    potassium bicarb-citric acid (EFFER-K) effervescent tablet 40 mEq  40 mEq Oral PRN Kimani Main MD        Or    potassium chloride 10 mEq/100 mL IVPB (Peripheral Line)  10 mEq Intravenous PRN Kimani Main MD        ondansetron (ZOFRAN-ODT) disintegrating tablet 4 mg  4 mg Oral Q8H PRN Kimani Main MD        Or    ondansetron [facility]) injection 4 mg  4 mg Intravenous Q6H PRN Kimani Main MD        magnesium hydroxide (MILK OF MAGNESIA) 400 MG/5ML suspension 30 mL  30 mL Oral Daily PRN Kimani Main MD        acetaminophen (TYLENOL) tablet 650 mg  650 mg Oral Q6H PRN Kimani Main MD   650 mg at 08/07/21 0935    Or    acetaminophen (TYLENOL) suppository 650 mg  650 mg Rectal Q6H PRN Kimani Main MD           Allergies  Patient has no known allergies.         ASSESSMENT/PLAN:  #1  Provoked bilateral pulmonary emboli     CTA of the chest with contrast on 8/6/2021  · Small to moderate amount of pulmonary embolus greatest in the right lower lobe pulmonary artery branches. There is bilateral pulmonary embolus. · No definite CT evidence of right heart strain.   · Mild cardiomegaly. · Focal parenchymal consolidation in the right lower lobe with surrounding groundglass opacity in the same distribution as thegreatest amount of pulmonary embolus. This may represent a pulmonary infarct.     PT 14.0/INR 1.06 and aPTT 21.3 on 8/6/2021  COVID-19, rapid negative on 8/6/2021      Prothrombotic work-up in process: Labs requested on 8/7/2021  · Antiphospholipid panel  · Beta-2 glycoprotein's   · Cardiolipin antibodies   · Protein C Activity  · Protein S antigen  · Antithrombin antibody  · Lupus anticoagulant  · Factor V Leiden  · Prothrombin gene  · MTHFR  · Homocystine level     Heparin drip 8/6/2021-8/8/2021. Initiated Eliquis 10 mg p.o. twice daily x7 days followed by Eliquis 5 mg p.o. twice daily on 8/8/2021.     Noninvasive venous study of bilateral lower extremities  on 8/7/2021 with no evidence of DVT, SVT or reflux. Plan to request JAK2 with reflex in the outpatient setting     Outpatient follow-up has been made to see Amber De Leon in about 2 weeks to follow-up on laboratory testing and in continuity of care regarding all of the above.     #2  New diagnosis of ductal carcinoma in situ of the left breast, ER 4%, stage 0. Initiated on preventive tamoxifen by Dr. Kandice Bridges on 6/21/2021  S/P simple left breast skin and nipple sparing mastectomy with left breast tissue expander and CARRIE drain by Dr. Kandice Bridges on 7/27/2021      Tamoxifen was started in the preventative setting. This of course is to prevent new disease developments over the future years in the right breast.  Although Amber De Leon will be on anticoagulation, she does have future surgeries scheduled for reconstruction.   I would prefer that she be off of tamoxifen which can be a prothrombotic agent until after all of that has been accomplished.     Recommend stopping Tamoxifen due to thrombotic risk factor  The issue of preventive therapy will be addressed after completion of left breast reconstruction.    At that time, if declared postmenopausal will consider initiating preventative Femara. Reports irregular menstrual cycles and the last menstrual cycle was in March 2021.      Dr. aJtin Mike discussed all of the above with Dr. Brooklynn Dietz who is in agreement with the plan as outlined on 8/7/2021.     Tory Susanne verbalized the understanding to discontinue taking tamoxifen and this would be readdressed after surgical interventions are completed. Okay from hematology/oncology standpoint to discharge when appropriate with others. Follow-up appointment will be made for 2 weeks with ENRIQUE Thomas. ,  Tory Skinner will be contacted tomorrow, 8/9/2021 with appointment date and time. ENRIQUE Jennings    08/08/21  9:15 AM      Physician's attestation and contribution:  I, Dr Que Mott, personally and independently performed an evaluation on  Marivel Carr        I have reviewed relevant medical information/data to include but not limited to the medication list, relevant appropriate lab work and imaging when applicable. I reviewed other physician's notes, ancillary services and nurses assessments. I have reviewed the above documentation completed by Quin NUNEZ   Please see my additional addended and/or modified contributions to the history of present illness, physical examination, and assessment/medical decision-making and plan that reflects my findings and impressions. I discussed essential elements of the care plan with Amara NUNEZ and the patient. I have encouraged and answered all the questions raised to the patient's understanding and satisfaction. I concur with the above stated. Subjective- Denies any shortness of breath or chest pain. Eager to be discharged home.   No new complaints or concerns. Objective- awake alert and oriented. Examination is stable without new changes. Assessment/plan:   I had an extended discussion and conference with Nay Obrien this morning explaining plans going forward regarding preventative endocrine therapy, anticoagulation, her future surgical interventions etc.  I will continue to coordinate in the outpatient setting with ENRIQUE Johnson regarding both anticoagulation and her new diagnosis of left breast DCIS and preventive therapy. The above plan was outlined and developed in coordination with Dr. Saira Jurado, her surgeon.      Electronically signed by Crystal Jain MD on 8/8/21 at 12:09 PM CDT

## 2021-08-08 NOTE — PROGRESS NOTES
PTT came back 91.5 decreased heparin gtt by 1 unit/kg/hr to 1388 units/hr 13.9mL/hr Electronically signed by Beth Love on 8/7/2021 at 11:03 PM Electronically signed by Beth Love on 8/7/2021 at 11:03 PM

## 2021-08-08 NOTE — DISCHARGE SUMMARY
[place], [place], [address]  DEPARTMENT OF HOSPITALIST MEDICINE    DISCHARGE SUMMARY:        Warren Morel  :  1970  MRN:  [id number]    Admit date:  2021  Discharge date:  2021      Admitting Physician:  Ajay Marti MD    Advance Directive: Full Code    Consults Made:   IP CONSULT TO GENERAL SURGERY  IP CONSULT TO SOCIAL WORK  IP CONSULT TO ONCOLOGY      Primary Care Physician:  Carmen Chatterjee, APRN - CNP    Discharge Diagnoses: Active Problems:    Bilateral pulmonary embolism (HCC)  Resolved Problems:    * No resolved hospital problems.  *          Pertinent Labs:  CBC with DIFF:  Recent Labs     21  1416 21  03521   WBC 15.4* 13.5* 12.6*   RBC 3.36* 3.40* 3.17*   HGB 10.8* 10.7* 10.2*   HCT 32.5* 32.7* 30.7*   MCV 96.7 96.2 96.8   MCH 32.1* 31.5* 32.2*   MCHC 33.2 32.7* 33.2   RDW 13.2 13.2 13.1    300 281   MPV 9.6 9.7 10.0   NEUTOPHILPCT 74.1* 69.0* 67.6*   LYMPHOPCT 11.3* 15.2* 15.3*   MONOPCT 8.9 8.9 9.5   EOSRELPCT 4.9 6.1* 6.5*   BASOPCT 0.4 0.3 0.6   NEUTROABS 11.4* 9.3* 8.6*   LYMPHSABS 1.7 2.1 1.9   MONOSABS 1.40* 1.20* 1.20*   EOSABS 0.80* 0.80* 0.80*   BASOSABS 0.10 0.00 0.10       CMP/BMP:  Recent Labs     21  1416 21  03521    137 141   K 4.2 4.1 4.4    100 106   CO2 22 28 24   ANIONGAP 11 9 11   GLUCOSE 96 100 109   BUN 12 12 10   CREATININE 0.5 0.6 0.6   LABGLOM >60 >60 >60   CALCIUM 9.0 9.0 8.7   PROT 7.4  --   --    LABALBU 4.0  --   --    BILITOT 0.3  --   --    ALKPHOS 72  --   --    ALT 12  --   --    AST 17  --   --          CRP:    Recent Labs     21   CRP 6.96*     Sed Rate:    Recent Labs     21   SEDRATE 37*         HgBA1c:  No components found for: HGBA1C  FLP:    Lab Results   Component Value Date    TRIG 68 2021    HDL 55 2021    LDLCALC 132 2021     TSH:    Lab Results   Component Value Date    TSH 2.150 2021     Troponin T: Recent Labs     08/06/21  1416   TROPONINI <0.01     Pro-BNP: No results for input(s): BNP in the last 72 hours. INR:   Recent Labs     08/06/21  1542   INR 1.06     ABGs: No results found for: PHART, PO2ART, [id number]  UA:No results for input(s): NITRITE, COLORU, PHUR, LABCAST, WBCUA, RBCUA, MUCUS, TRICHOMONAS, YEAST, BACTERIA, CLARITYU, SPECGRAV, LEUKOCYTESUR, UROBILINOGEN, BILIRUBINUR, BLOODU, GLUCOSEU, AMORPHOUS in the last 72 hours. Invalid input(s): Kourtney Terrell      Culture Results:    No results for input(s): CXSURG in the last 720 hours. Blood Culture Recent:   Recent Labs     08/06/21 2137   BC No Growth to date. Any change in status will be called. Cultures:   No results for input(s): CULTURE in the last 72 hours. Recent Labs     08/06/21 2137 08/06/21 2150   BC No Growth to date. Any change in status will be called. --    BLOODCULT2  --  No Growth to date. Any change in status will be called. No results for input(s): CXSURG in the last 72 hours. No results for input(s): MG, PHOS in the last 72 hours. Recent Labs     08/06/21  1416   AST 17   ALT 12   BILITOT 0.3   ALKPHOS 72           Significant Diagnostic Studies:   CTA PULMONARY W CONTRAST    Result Date: 8/6/2021  EXAMINATION:  CTA PULMONARY W CONTRAST  8/6/2021 4:25 PM HISTORY: Right chest pain. Left mastectomy 10 days ago. There is clinical concern for pulmonary embolus. COMPARISON: No comparison study. DLP: 456 mGy-cm. Automated exposure control was utilized. TECHNIQUE: Spiral CT angiography was performed of the chest with contrast. Sagittal, coronal and 3-D images were reconstructed. MEDIASTINUM/VASCULAR: There is pulmonary embolus bilaterally. There is a greater amount of pulmonary embolus in right lower lobe pulmonary artery branches. The right heart chambers do not appear to be dilated compared to the left. The main pulmonary artery segment measures 2.7 cm. There is no reflux of contrast into the inferior vena cava. The thoracic aorta is normal caliber. There is mild cardiomegaly. LUNGS: There is focal parenchymal consolidation in the right lower lobe with surrounding groundglass opacity. This is in the same distribution as the greatest amount of pulmonary embolus. Therefore, this could represent an area of pulmonary infarction. There is mild left lower lobe atelectasis. UPPER ABDOMEN: There is no significant abnormality identified in the upper abdomen. BONES/SOFT TISSUES/: No acute appearing bony abnormality is seen. There has been recent left breast surgery with mastectomy and reconstruction with a soft tissue expander. There is some edema surrounding the tissue expander. There is air density within the expander. There is a surgical drain extending around the soft tissue expander. 1. Small to moderate amount of pulmonary embolus greatest in the right lower lobe pulmonary artery branches. There is bilateral pulmonary embolus. 2. No definite CT evidence of right heart strain. There is mild cardiomegaly. 3. Focal parenchymal consolidation in the right lower lobe with surrounding groundglass opacity in the same distribution as the greatest amount of pulmonary embolus. This may represent a pulmonary infarct. 4. Postoperative changes of the left breast, as described. The pulmonary embolus result was discussed with Dr. Mariel Morales by Dr. Yaritza Martinez around 3:28 PM on 8/6/2021. Signed by Dr Shayan Aguero Course:   Ms. Man Bales 54 y.o. female with a history of HTN, breast cancer (status post mastectomy-07/27/2021), presented to [facility] ED (08/06/2021), on account of chronic acute onset of severe sharp/knifelike left-sided pleuritic chest pain/discomfort, which started prior to going to bed last night.  Pain worsens with deep inspiration.     Pain and discomfort has continued to persist upon awakening.     CTA chest (08/06/2021): Bilateral pulmonary embolus.  Focal parenchymal consolidation in the right lower lobe with surrounding groundglass opacity in the same distribution as the greatest amount of pulmonary embolus.      Heparin infusion initiated  Patient admitted for further work-up and management.           Unprovoked Acute Bilateral PE  · CTA chest (08/06/2021): Impression: Small to moderate amount of pulmonary embolus greatest in the right lower lobe pulmonary artery branches. There is bilateral pulmonary embolus. No definite CT evidence of right heart strain. There is mild cardiomegaly. Focal parenchymal consolidation in the right lower lobe with surrounding groundglass opacity in the same distribution as the greatest amount of pulmonary embolus. This may represent a pulmonary infarct. Postoperative changes of the left breast, as described. · Heparin ggt while inpatient  · Transition to Apixaban 08/08/2021  · Initiation: Apixaban 10 mg p.o. twice daily x7 days (08/08/2021-08/14/2021)  · Maintenance: Apixaban 5 mg p.o. twice daily (start date: 08/15/2021)  · Bilateral lower extremity duplex ultrasound (08/07/2021): No evidence of DVT or SVT  · Hematology consulted, for further work-up and to establish care for likely outpatient follow-up  · Prothrombotic [id number]  o Cardiolipin antibodies   o Factor V Leiden   o Anticardiolipin antibodies  o Lupus anticoagulant  o Beta-2 Glycoprotein Antibodies  o Protein C Functional  o Protein S Antigen  o JAK2 with reflex  o Antithrombin lll   o Prothrombin Gene  o MTHFR mutation  o Homocystine    · Follow-up with hematology outpatient  · Seen by general surgery  · Recommend possibly stopping tamoxifen  · Further discussion outpatient with general surgery upon follow-up     ? Pneumonia, due to unspecified organism.   · -Afebrile  ·  Leukocytosis (WBC: 15.4) - 08/06/2021  · Leukocytosis improved: WBC of 12.6 (08/08/2021)  · CTA chest (08/06/2021): Focal parenchymal consolidation in the right lower lobe with surrounding groundglass opacity  · Procalcitonin within lab reference range: 0.05  · Elevated CRP: 6.96 (08/06/2021)  · Elevated ESR  · - Blood culture no growth to date  · -Ceftriaxone and Azithromycin while inpatient  · Cefdinir, to complete antibiotic course. · - Oxygen supplementation to keep SPO2 > 92%, however patient with no oxygen requirement. · - Continued to monitor        History of breast cancer  · Status post mastectomy (07/27/2021)  · General surgery consulted from ED  · Follow-up with general surgery and oncology outpatient                      Continued management of other chronic medical conditions        Physical Exam:  Vital Signs: /84   Pulse 94   Temp 97 °F (36.1 °C) (Temporal)   Resp 16   Ht 5' 5\" (1.651 m)   Wt 180 lb (81.6 kg)   SpO2 99%   BMI 29.95 kg/m²   Physical Exam  Vitals and nursing note reviewed. Constitutional:       General: She is not in acute distress. Appearance: Normal appearance. She is not ill-appearing, toxic-appearing or diaphoretic. HENT:      Head: Normocephalic and atraumatic. Right Ear: External ear normal.      Left Ear: External ear normal.      Nose: Nose normal. No congestion or rhinorrhea. Mouth/Throat:      Mouth: Mucous membranes are moist.      Pharynx: Oropharynx is clear. No oropharyngeal exudate or posterior oropharyngeal erythema. Eyes:      General: No scleral icterus. Right eye: No discharge. Left eye: No discharge. Extraocular Movements: Extraocular movements intact. Conjunctiva/sclera: Conjunctivae normal.      Pupils: Pupils are equal, round, and reactive to light. Neck:      Vascular: No carotid bruit. Cardiovascular:      Rate and Rhythm: Normal rate and regular rhythm. Pulses: Normal pulses. Heart sounds: Normal heart sounds. No murmur heard. No friction rub. No gallop. Pulmonary:      Effort: Pulmonary effort is normal. No respiratory distress. Breath sounds: Normal breath sounds. No stridor. No wheezing, rhonchi or rales.    Chest: Chest wall: No tenderness. Abdominal:      General: Bowel sounds are normal. There is no distension. Palpations: Abdomen is soft. Tenderness: There is no abdominal tenderness. There is no guarding or rebound. Musculoskeletal:         General: No swelling, tenderness, deformity or signs of injury. Normal range of motion. Cervical back: Normal range of motion and neck supple. No rigidity. No muscular tenderness. Right lower leg: No edema. Left lower leg: No edema. Skin:     General: Skin is warm and dry. Capillary Refill: Capillary refill takes less than 2 seconds. Coloration: Skin is not jaundiced or pale. Findings: No bruising, erythema, lesion or rash. Neurological:      General: No focal deficit present. Mental Status: She is alert and oriented to person, place, and time. Cranial Nerves: No cranial nerve deficit. Sensory: No sensory deficit. Motor: No weakness. Coordination: Coordination normal.   Psychiatric:         Mood and Affect: Mood normal.         Behavior: Behavior normal.         Thought Content: Thought content normal.         Judgment: Judgment normal.         Discharge Medications:       Tanvi Rand   Home Medication Instructions UTK:[id number]    Printed on:08/08/21 2647   Medication Information                      apixaban starter pack (ELIQUIS) 5 MG TBPK tablet  Take 1 tablet by mouth See Admin Instructions             cefdinir (OMNICEF) 300 MG capsule  Take 1 capsule by mouth 2 times daily for 5 days             cetirizine (ZYRTEC) 10 MG tablet  Take 10 mg by mouth daily             HYDROcodone-acetaminophen (NORCO) 5-325 MG per tablet  Take 1 tablet by mouth every 6 hours as needed for Pain.              lisinopril (PRINIVIL;ZESTRIL) 5 MG tablet  Take 0.5 tablets by mouth daily             Probiotic Product (PROBIOTIC ADVANCED) CAPS  Take 1 capsule by mouth daily             tamoxifen (NOLVADEX) 20 MG tablet  Take 1 tablet by mouth daily                 Discharge Instructions: Follow up with ENRIQUE Engel CNP in 7 days. Take medications as directed. Resume activity as tolerated. Diet: ADULT DIET; Regular        DISCHARGE STATUS:    Condition: Fair  Disposition: Patient is medically stable and will be discharged home    Extended Emergency Contact Information  Primary Emergency Contact: Stann Skiff, [address] [facility] Phone: [phone]  Relation: Spouse  Secondary Emergency Contact: laisha davis  Relation: Parent       Time Spent on discharge is  34 mins in the examination, evaluation, counseling and review of medications and discharge plan. Electronically signed by   Gay Orozco MD, MPH, MD,   Internal Medicine Hospitalist   8/8/2021 12:09 PM      Thank you ENRIQUE Engel CNP for the opportunity to be involved in this patient's care. If you have any questions or concerns please feel free to contact me at [phone]        EMR Dragon/Transcription disclaimer:   Much of this encounter note is an electronic transcription/translation of spoken language to printed text.  The electronic translation of spoken language may permit erroneous, or at times, nonsensical words or phrases to be inadvertently transcribed; although attempts have made to review the note for such errors, some may still exist.

## 2021-08-08 NOTE — PROGRESS NOTES
[facility] General Surgery    Progress Note    Brief postop visit. Ms. Radha Justice reports no issues related to her recent breast surgery. The hospitalist service plans discharge home today on oral anticoagulation. She has a previously scheduled follow-up in our office on Wednesday of this week with Aleta Hurst PA-C. I have encouraged her to keep this. I suspect her drain will be ready to be removed at that time. Clary Lan and Dr. Francisco Dejesus can speak with her about rescheduling her second stage breast reconstruction as well. She will otherwise call in the interim with any questions or concerns.

## 2021-08-09 ENCOUNTER — CARE COORDINATION (OUTPATIENT)
Dept: CASE MANAGEMENT | Age: 51
End: 2021-08-09

## 2021-08-09 DIAGNOSIS — I26.99 BILATERAL PULMONARY EMBOLISM (HCC): Primary | ICD-10-CM

## 2021-08-09 LAB
ANTITHROMBIN ACTIVITY: 92 % (ref 76–128)
ANTITHROMBIN ANTIGEN: 76 % (ref 82–136)
DRVVT CONFIRMATION TEST: ABNORMAL RATIO
DRVVT SCREEN: 33 SEC (ref 33–44)
DRVVT,DIL: ABNORMAL SEC (ref 33–44)
HEXAGONAL PHOSPHOLIPID NEUTRALIZAT TEST: ABNORMAL
HOMOCYSTEINE: 5 UMOL/L (ref 0–15)
LUPUS ANTICOAG INTERP: ABNORMAL
PLT NEUTA: ABNORMAL
PROTEIN C ANTIGEN: >95 % (ref 63–153)
PROTEIN S ANTIGEN, TOTAL: 107 % (ref 63–126)
PT D: 14 SEC (ref 12–15.5)
PTT D: 108 SEC (ref 32–48)
PTT-D CORR REFLEX: ABNORMAL SEC (ref 32–48)
PTT-HEPARIN NEUTRALIZED: 46 SEC (ref 32–48)
REPTILASE TIME: 14.7 SEC
THROMBIN TIME: 101.5 SEC (ref 14.7–19.5)

## 2021-08-09 NOTE — CARE COORDINATION
Cm 45 Transitions Initial Follow Up Call    Call within 2 business days of discharge: Yes    Patient: Anna Solano Patient : 1970   MRN: [id number]  Reason for Admission: Jori P. E.   Discharge Date: 21 RARS: Readmission Risk Score: 9      Last Discharge [facility]       Complaint Diagnosis Description Type Department Provider    21 Other Bilateral pulmonary embolism [facility]) ED to Hosp-Admission (Discharged) (ADMIT) L 5 SURG Roberto Thomas MD; Cass Corey. .. Spoke with: [address]: [address] [employer]      Non-face-to-face services provided:  Reviewed encounter information for continuity of care prior to follow up Care Transitions phone call - chart notes, consults, progress notes, test results, med list, appointments, AVS, other information. Care Transitions 24 Hour Call    Schedule Follow Up Appointment with PCP: Declined  Do you have a copy of your discharge instructions?: Yes  Do you have all of your prescriptions and are they filled?: Yes  Have you been contacted by a [address]?: No  Have you scheduled your follow up appointment?: Yes  How are you going to get to your appointment?: Car - family or friend to transport  Were you discharged with any Home Care or Post Acute Services: No  Do you feel like you have everything you need to keep you well at home?: Yes  Care Transitions Interventions       Placed a call to the number listed for patient and spoke with her for an initial follow up call for Care Transitions Coordinatin following discharge from the hospital on 2021. She reported that she is still having a little of pain on the right side when she sneezes, coughs, laughs or anything else that involves sudden movement. Described it as a \"catch\". She is taking meds as ordered. She said the incision looks good, no issues there. Not a lot of pain involved from a surgical standpoint now.   Has follow up with Dr. Daya Lan on Wednesday. Does not have one with PCP and does not feel it is necessary at this time. She is not aware of any other issues. Discussed COVID 19 information, risks, signs, quarantine, infection control, vaccines, etc.  Patient aware and voices understanding. She does not see a need for further follow up calls. Given the opportunity to ask questions and answered appropriately. Ensured to have CTN contact information to be used as needed. Encouraged to call for new issues, questions, concerns, etc.  No further scheduled calls needed at this time. Transitions of Care Initial Call    Was this an external facility discharge? No    Challenges to be reviewed by the provider   Additional needs identified to be addressed with provider: No       Method of communication with provider : none    Advance Care Planning:   Does patient have an Advance Directive: not on file. Advance Care Planning   Healthcare Decision Maker:  Patient does not have a current health care decision maker listed and is not able to name one at this time. Was this a readmission? No  Patient stated reason for admission: \"I was having pain when taking deep breaths, could not catch my breath. \"  Patients top risk factors for readmission: functional physical ability, medical condition-P.E., post op status, cancer and medication management    Care Transition Nurse (CTN) contacted the patient by telephone to perform post hospital discharge assessment. Verified name and  with patient as identifiers. Provided introduction to self, and explanation of the CTN role. CTN reviewed discharge instructions, medical action plan and red flags with patient who verbalized understanding. Patient given an opportunity to ask questions and does not have any further questions or concerns at this time. Were discharge instructions available to patient? Yes.  Reviewed appropriate site of care based on symptoms and resources available to patient including: PCP, Specialist, Urgent care clinics, Home health, When to call 911 and [address]. The patient agrees to contact the PCP office for questions related to their healthcare. Medication reconciliation was performed with patient, who verbalizes understanding of administration of home medications. Advised obtaining a 90-day supply of all daily and as-needed medications. Covid Risk Education     Educated patient about risk for severe COVID-19 due to risk factors according to CDC guidelines. CTN reviewed discharge instructions, medical action plan and red flag symptoms with the patient who verbalized understanding. Discussed COVID vaccination status: Yes. Education provided on COVID-19 vaccination as appropriate. Discussed exposure protocols and quarantine with CDC Guidelines. Patient was given an opportunity to verbalize any questions and concerns and agrees to contact CTN or health care provider for questions related to their healthcare. CTN provided contact information. No further follow-up call indicated based on severity of symptoms and risk factors.       Follow Up  Future Appointments   Date Time Provider Jami Wilkinson   8/11/2021 11:30 AM JOSIE Mcgovern [facility]   8/23/2021  2:00 PM SCHEDULE, L MED ONC MA L MED ONC [facility]   8/23/2021  2:15 PM ENRIQUE Kaur HEMONC [facility]   9/13/2021  3:30 PM MD KAYKAY Gonzales [facility]   10/26/2021 11:30 AM ENRIQUE Mcpherson - CNP LPS [place] [facility] [place]       Irina Hanley RN

## 2021-08-10 LAB
ANTICARDIOLIPIN IGG ANTIBODY: <10 GPL (ref 0–14)
BETA-2 GLYCOPROTEIN 1 IGG ANTIBODY: 0 SGU (ref 0–20)
BETA-2 GLYCOPROTEIN 1 IGM ANTIBODY: 2 SMU (ref 0–20)
CARDIOLIPIN AB IGM: <10 MPL (ref 0–12)

## 2021-08-11 ENCOUNTER — OFFICE VISIT (OUTPATIENT)
Dept: SURGERY | Age: 51
End: 2021-08-11

## 2021-08-11 VITALS
HEIGHT: 65 IN | BODY MASS INDEX: 31.16 KG/M2 | SYSTOLIC BLOOD PRESSURE: 118 MMHG | DIASTOLIC BLOOD PRESSURE: 70 MMHG | WEIGHT: 187 LBS | TEMPERATURE: 97.8 F

## 2021-08-11 DIAGNOSIS — Z90.12 S/P LEFT MASTECTOMY: Primary | ICD-10-CM

## 2021-08-11 DIAGNOSIS — I26.99 BILATERAL PULMONARY EMBOLISM (HCC): ICD-10-CM

## 2021-08-11 LAB
MTHFR BY PCR SPECIMEN: ABNORMAL
MTHFR INTERPRETATION: ABNORMAL
MTHFR MUTATION A1298C: ABNORMAL
MTHFR MUTATION C677T: ABNORMAL

## 2021-08-11 PROCEDURE — 99024 POSTOP FOLLOW-UP VISIT: CPT | Performed by: PHYSICIAN ASSISTANT

## 2021-08-11 NOTE — PROGRESS NOTES
Patient comes today in follow-up from left mastectomy with tissue expander reconstruction. Unfortunately postoperatively she had a pulmonary embolus and is currently on Eliquis. She states she is doing well. She has had minimal Jesús-Rhoades drainage. Patient Active Problem List    Diagnosis Date Noted    Bilateral pulmonary embolism ([address] 2021    S/P left mastectomy with expander reconstruction 2021    Intraductal carcinoma in situ of left breast 2021    Ductal carcinoma in situ (DCIS) of left breast 2021    Atypical ductal hyperplasia of breast 2021    Lump or mass in breast 2021     Current Outpatient Medications   Medication Sig Dispense Refill    cefdinir (OMNICEF) 300 MG capsule Take 1 capsule by mouth 2 times daily for 5 days 10 capsule 0    apixaban starter pack (ELIQUIS) 5 MG TBPK tablet Take 1 tablet by mouth See Admin Instructions 74 tablet 0    HYDROcodone-acetaminophen (NORCO) 5-325 MG per tablet Take 1 tablet by mouth every 6 hours as needed for Pain. 12 tablet 0    cetirizine (ZYRTEC) 10 MG tablet Take 10 mg by mouth daily      Probiotic Product (PROBIOTIC ADVANCED) CAPS Take 1 capsule by mouth daily      tamoxifen (NOLVADEX) 20 MG tablet Take 1 tablet by mouth daily 30 tablet 3    lisinopril (PRINIVIL;ZESTRIL) 5 MG tablet Take 0.5 tablets by mouth daily 45 tablet 1     No current facility-administered medications for this visit. Allergies: Patient has no known allergies.      Past Medical History:   Diagnosis Date    Cancer [facility])     DCIS     delivery delivered     Ductal carcinoma in situ (DCIS) of left breast 2021    History of D&C     1993    Hypertension      Past Surgical History:   Procedure Laterality Date     SECTION      DILATION AND CURETTAGE OF UTERUS      X2    MASTECTOMY Left 2021    LEFT NIPPLE SPARING MASTECTOMY VIA WISE PATTERN, TISSUE EXPANDER RECONSTRUCTION, PEC BLOCK performed by Susana Car MD at [address] LEFT Left 6/2/2021     BREAST NEEDLE BIOPSY LEFT 6/2/2021 [facility] GENERAL SURGERY     Family History   Problem Relation Age of Onset    Hypertension Mother     No Known Problems Father     Thyroid Disease Sister     Cancer Maternal Great Grandmother         Unknown     Social History     Tobacco Use    Smoking status: Never Smoker    Smokeless tobacco: Never Used   Substance Use Topics    Alcohol use: Never       Exam  Blood pressure 118/70, temperature 97.8 °F (36.6 °C), temperature source Temporal, height 5' 5\" (1.651 m), weight 187 lb (84.8 kg), not currently breastfeeding. On examination, her incision is looking well. There is some mild ecchymosis. There is no evidence of infection. Impression  Status post left mastectomy with tissue expander reconstruction  New recent diagnosis of pulmonary emboli currently on Eliquis    Plan  I have removed her Jesús-Rhoades drain today. I have given her instructions for showering and activity. We will plan to see her back in the office in 1 week.

## 2021-08-12 LAB
ANTICARDIOLIPIN IGA ANTIBODY: 1.5 APL (ref 0–14)
ANTICARDIOLIPIN IGG ANTIBODY: 1.3 GPL (ref 0–10)
ANTICARDIOLIPIN IGM ANTIBODY: 3.8 MPL (ref 0–10)
BLOOD CULTURE, ROUTINE: NORMAL
CULTURE, BLOOD 2: NORMAL
FACTOR V LEIDEN: NEGATIVE
PROTHROMBIN G20210A MUTATION: NEGATIVE
PT PCR SPECIMEN: NORMAL
SPECIMEN: NORMAL

## 2021-08-20 DIAGNOSIS — D05.12 INTRADUCTAL CARCINOMA IN SITU OF LEFT BREAST: ICD-10-CM

## 2021-08-20 DIAGNOSIS — D05.12 DUCTAL CARCINOMA IN SITU (DCIS) OF LEFT BREAST: Primary | ICD-10-CM

## 2021-08-20 DIAGNOSIS — I26.99 BILATERAL PULMONARY EMBOLISM (HCC): ICD-10-CM

## 2021-08-23 ENCOUNTER — OFFICE VISIT (OUTPATIENT)
Dept: HEMATOLOGY | Age: 51
End: 2021-08-23
Payer: COMMERCIAL

## 2021-08-23 ENCOUNTER — HOSPITAL ENCOUNTER (OUTPATIENT)
Dept: INFUSION THERAPY | Age: 51
Discharge: HOME | End: 2021-08-23
Payer: COMMERCIAL

## 2021-08-23 VITALS
WEIGHT: 190 LBS | SYSTOLIC BLOOD PRESSURE: 136 MMHG | HEART RATE: 92 BPM | OXYGEN SATURATION: 98 % | DIASTOLIC BLOOD PRESSURE: 98 MMHG | HEIGHT: 65 IN | BODY MASS INDEX: 31.65 KG/M2

## 2021-08-23 DIAGNOSIS — I26.99 BILATERAL PULMONARY EMBOLISM (HCC): ICD-10-CM

## 2021-08-23 DIAGNOSIS — D05.12 DUCTAL CARCINOMA IN SITU (DCIS) OF LEFT BREAST: Primary | ICD-10-CM

## 2021-08-23 DIAGNOSIS — D05.12 INTRADUCTAL CARCINOMA IN SITU OF LEFT BREAST: ICD-10-CM

## 2021-08-23 DIAGNOSIS — D05.12 DUCTAL CARCINOMA IN SITU (DCIS) OF LEFT BREAST: ICD-10-CM

## 2021-08-23 LAB
BASOPHILS ABSOLUTE: 0.05 K/UL (ref 0.01–0.08)
BASOPHILS RELATIVE PERCENT: 0.6 % (ref 0.1–1.2)
EOSINOPHILS ABSOLUTE: 0.58 K/UL (ref 0.04–0.54)
EOSINOPHILS RELATIVE PERCENT: 6.7 % (ref 0.7–7)
HCT VFR BLD CALC: 37.9 % (ref 34.1–44.9)
HEMOGLOBIN: 12.1 G/DL (ref 11.2–15.7)
LYMPHOCYTES ABSOLUTE: 2.22 K/UL (ref 1.18–3.74)
LYMPHOCYTES RELATIVE PERCENT: 25.6 % (ref 19.3–53.1)
MCH RBC QN AUTO: 31.5 PG (ref 25.6–32.2)
MCHC RBC AUTO-ENTMCNC: 31.9 G/DL (ref 32.3–35.5)
MCV RBC AUTO: 98.7 FL (ref 79.4–94.8)
MONOCYTES ABSOLUTE: 0.56 K/UL (ref 0.24–0.82)
MONOCYTES RELATIVE PERCENT: 6.5 % (ref 4.7–12.5)
NEUTROPHILS ABSOLUTE: 5.27 K/UL (ref 1.56–6.13)
NEUTROPHILS RELATIVE PERCENT: 60.6 % (ref 34–71.1)
PDW BLD-RTO: 13 % (ref 11.7–14.4)
PLATELET # BLD: 214 K/UL (ref 182–369)
PMV BLD AUTO: 10.8 FL (ref 7.4–10.4)
RBC # BLD: 3.84 M/UL (ref 3.93–5.22)
WBC # BLD: 8.68 K/UL (ref 3.98–10.04)

## 2021-08-23 PROCEDURE — 99211 OFF/OP EST MAY X REQ PHY/QHP: CPT

## 2021-08-23 PROCEDURE — 85025 COMPLETE CBC W/AUTO DIFF WBC: CPT

## 2021-08-23 PROCEDURE — 99214 OFFICE O/P EST MOD 30 MIN: CPT | Performed by: NURSE PRACTITIONER

## 2021-08-23 RX ORDER — MULTIVIT-MIN/IRON/FOLIC ACID/K 18-600-40
500 CAPSULE ORAL DAILY
COMMUNITY

## 2021-08-23 NOTE — PROGRESS NOTES
Progress Note      Pt Name: Jeanmarie Push: 1970  MRN: [id number]    Date of evaluation: 8/23/2021  History Obtained From:  patient, electronic medical record    CHIEF COMPLAINT:    Chief Complaint   Patient presents with    Follow-Up from Hospital     Pulmonary Embolism / Breast Cancer    Breast Cancer     HISTORY OF PRESENT ILLNESS:    José Benítez is a 54 y.o.  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 Harsha Monsivais was initiated on preventive endocrine therapy with tamoxifen on 6/21/2021 followed by left nipple sparing mastectomy with immediate tissue expander reconstruction on 7/27/2021. She presented to [address] [place] [address] ED on 8/6/2021 right sharp pleuritic chest discomfort and shortness of breath. CTA of the chest revealed bilateral pulmonary embolus with no evidence of right heart strain and she was initiated on heparin drip and then transitioned to Eliquis on 8/8/2021. Harsha Monsivais returns today in hospital follow-up for evaluation, lab monitoring and further treatment recommendations. She confirmed that she is not taking her tamoxifen as recommended. Harsha Monsivais presents today indicating that overall she is doing well, denies any bleeding to include melena, epistaxis, hematuria or hematochezia. She reports being compliant with Eliquis 5 mg p.o. twice daily. She reports her shortness of air has significantly improved and she is no longer experiencing pleuritic chest pain. She was seen in follow-up by KATHLEEN Baez with Dr. Maria Luisa Henley and he removed her CARRIE drain on 8/11/2021, she has a follow-up appointment on 8/25/2021 and is anticipating tissue expander fill. Harsha Monsivais is anticipating left breast abdominal flap reconstructive surgery in the near future.     I reviewed the following lab results with Harsha Monsivais, no underlying concerning findings identified that would place Priti Polanco at a increased risk for a thrombotic event. Serology studies on 8/7/2021  · Anticardiolipin IgA 1.5, IgG 1.3, IgM 3.8 -negative  · Beta 2 Glycoprotein IgG 0, IgM 2 (0-20)  · Cardiolipin antibodies IgG <10, IgM <10 (0-14)  · Protein C Ag >95 (%)  · Protein S Ag 107 (%)  · Antithrombin activity 92 ()  · Antithrombin Ag 76 ()  · Lupus anticoagulant-  not detected  · Factor V Leiden - negative  · Prothrombin gene mutation - negative  · MTHFR: compound heterozygous Mutation C677T and Mutation [id number] (may be associated with a mild decrease in MTHFR enzyme activity although clinically insignificant and most likely not a factor recent finding of pulmonary emboli)   · Homocysteine 5 (0-15)       ONCOLOGIC HISTORY:  Ductal Carcinoma in Situ of the left breast, ER 94%, stage 0, pTis, pNx, pMx  Priti Polanco was seen in initial medical oncology and hematology consultation on 8/7/2021 regarding bilateral pulmonary emboli in the setting of a recent left mastectomy for DCIS.   Priti Polanco established care and a well visit with [facility] on 4/13/2021.  A Pap smear was completed and she was incidentally noted to have an elevated blood pressure and placed on lisinopril with recommendations for close monitoring of BP.  Orders were also given for routine screening arrangements were made for bilateral mammogram.     Bilateral mammogram at [facility] on 4/19/2021  · Left breast with multiple groups of calcifications, which have increased in size and number compared to 07/14/2014. · No mammographic evidence of malignancy in the right breast.      Left mammogram with CAD at [facility] on 4/23/2021  · Multiple similar appearing groups of indeterminate calcifications in   the left breast. Recommend stereotactic left breast biopsy.      Ultrasound-guided left breast biopsy at [facility] on 4/30/2021   · Pathology indicating atypical ductal hyperplasia. developments over the future years in the right breast.  Although Angélica Norman will be on anticoagulation, she does have future surgeries scheduled for reconstruction. Dr. Trevor Sweeney prefered that she be off of tamoxifen which can be a prothrombotic agent until after all of that has been accomplished.     Recommend stopping Tamoxifen due to thrombotic risk factor on 8/7/2021  The issue of preventive therapy will be addressed after completion of left breast reconstruction.    At that time, if declared postmenopausal will consider initiating preventative Femara. Reports irregular menstrual cycles and the last menstrual cycle was in March 2021.        Treatment summary:  · Initiated on preventative tamoxifen 20 mg on 6/21/2021  · Left nipple sparing mastectomy with immediate tissue expander reconstruction on 7/27/2021 by Dr. Hamida Cuevas  · Preventative tamoxifen held on 8/7/2021       HEMATOLOGIC HISTORY:  Provoked bilateral pulmonary emboli, 8/6/2021 (suspect secondary to tamoxifen and surgical intervention)  Angélica Norman was admitted to [address] [place] [address] to the ED on 8/6/2021 with bilateral pulmonary emboli.   Angélica Norman presented to [address] [place] [address] ED with right-sided sharp pleuritic chest discomfort over the previous 12-24 hours. The pain was a stabbing knifelike sensation    CTA of the chest with contrast on 8/6/2021  · Small to moderate amount of pulmonary embolus greatest in the right lower lobe pulmonary artery branches. There is bilateral pulmonary embolus. · No definite CT evidence of right heart strain.   · Mild cardiomegaly. · Focal parenchymal consolidation in the right lower lobe with surrounding groundglass opacity in the same distribution as thegreatest amount of pulmonary embolus.  This may represent a pulmonary infarct.     PT 14.0/INR 1.06 and aPTT 21.3 on 8/6/2021  COVID-19, rapid negative on 8/6/2021     Angélica Norman was placed on on heparin drip and admitted for further evaluation and recommendations.     Noninvasive venous study of bilateral lower extremities  on 2021 with no evidence of DVT, SVT or reflux.     Serology studies on 2021  · Anticardiolipin IgA 1.5, IgG 1.3, IgM 3.8 -negative  · Beta 2 Glycoprotein IgG 0, IgM 2 (0-20)  · Cardiolipin antibodies IgG <10, IgM <10 (0-14)  · Protein C Ag >95 (%)  · Protein S Ag 107 (%)  · Antithrombin activity 92 ()  · Antithrombin Ag 76 ()  · Lupus anticoagulant-  not detected  · Factor V Leiden - negative  · Prothrombin gene mutation - negative  · MTHFR: compound heterozygous Mutation C677T and Mutation [id number] (may be associated with a mild decrease in MTHFR enzyme activity although clinically insignificant and most likely not a factor recent finding of pulmonary emboli)   · Homocysteine 5 (0-15)    Heparin discontinued and initiated Eliquis 10 mg p.o. twice daily x7 days followed by Eliquis 5 mg p.o. twice daily on 2021.       Past Medical History:    Past Medical History:   Diagnosis Date    Cancer [facility])     DCIS     delivery delivered     Ductal carcinoma in situ (DCIS) of left breast 2021    History of D&C     1993    Hypertension        Past Surgical History:    Past Surgical History:   Procedure Laterality Date     SECTION      DILATION AND CURETTAGE OF UTERUS      X2    MASTECTOMY Left 2021    LEFT NIPPLE SPARING MASTECTOMY VIA WISE PATTERN, TISSUE EXPANDER RECONSTRUCTION, PEC BLOCK performed by Albertina [facility]quez MD at [facility] LEFT Left 2021     BREAST NEEDLE BIOPSY LEFT 2021 [facility] GENERAL SURGERY       Current Medications:    Current Outpatient Medications   Medication Sig Dispense Refill    Ascorbic Acid (VITAMIN C) 500 MG CAPS       Fluticasone Propionate (FLONASE ALLERGY RELIEF NA)       levonorgestrel (MIRENA, 52 MG,) IUD 52 mg       cetirizine (ZYRTEC) 10 MG tablet Take 10 mg by mouth daily      Probiotic Product (PROBIOTIC ADVANCED) CAPS Take 1 capsule by mouth daily  lisinopril (PRINIVIL;ZESTRIL) 5 MG tablet Take 0.5 tablets by mouth daily 45 tablet 1    apixaban (ELIQUIS) 5 MG TABS tablet Take 1 tablet by mouth 2 times daily 60 tablet 1     No current facility-administered medications for this visit. Allergies: No Known Allergies    Social History:    Social History     Tobacco Use    Smoking status: Never Smoker    Smokeless tobacco: Never Used   Vaping Use    Vaping Use: Never used   Substance Use Topics    Alcohol use: Never    Drug use: Never       Family History:   Family History   Problem Relation Age of Onset    Hypertension Mother     No Known Problems Father     Thyroid Disease Sister     Cancer Maternal Great Grandmother         Unknown       Vitals:  Vitals:    08/23/21 1350   BP: (!) 136/98   Pulse: 92   SpO2: 98%   Weight: 190 lb (86.2 kg)   Height: 5' 5\" (1.651 m)        Subjective   REVIEW OF SYSTEMS:   Review of Systems   Constitutional: Positive for fatigue. Negative for chills, diaphoresis and fever. HENT: Negative. Negative for congestion, ear pain, hearing loss, nosebleeds, sore throat and tinnitus. Eyes: Negative. Negative for pain, discharge and redness. Respiratory: Positive for shortness of breath (Mild with exertion). Negative for cough and wheezing. Cardiovascular: Negative. Negative for chest pain, palpitations and leg swelling. Gastrointestinal: Negative. Negative for abdominal pain, blood in stool, constipation, diarrhea, nausea and vomiting. Endocrine: Negative for polydipsia. Genitourinary: Negative for dysuria, flank pain, frequency, hematuria and urgency. Musculoskeletal: Negative. Negative for back pain, myalgias and neck pain. Skin: Negative. Negative for rash. Neurological: Negative. Negative for dizziness, tremors, seizures, weakness and headaches. Hematological: Does not bruise/bleed easily. Psychiatric/Behavioral: Negative. The patient is not nervous/anxious.         Objective PHYSICAL EXAM:  Physical Exam  Vitals reviewed. Constitutional:       General: She is not in acute distress. Appearance: She is well-developed. She is not diaphoretic. HENT:      Head: Normocephalic and atraumatic. Mouth/Throat:      Pharynx: Uvula midline. Tonsils: No tonsillar exudate. Eyes:      General: Lids are normal. No scleral icterus. Right eye: No discharge. Left eye: No discharge. Conjunctiva/sclera: Conjunctivae normal.      Pupils: Pupils are equal, round, and reactive to light. Neck:      Thyroid: No thyroid mass or thyromegaly. Vascular: No JVD. Trachea: Trachea normal. No tracheal deviation. Cardiovascular:      Rate and Rhythm: Normal rate and regular rhythm. Heart sounds: Normal heart sounds. No murmur heard. No friction rub. No gallop. Pulmonary:      Effort: Pulmonary effort is normal. No respiratory distress. Breath sounds: Normal breath sounds. No wheezing or rales. Chest:      Chest wall: No tenderness. Abdominal:      General: Bowel sounds are normal. There is no distension. Palpations: Abdomen is soft. There is no mass. Tenderness: There is no abdominal tenderness. There is no guarding or rebound. Hernia: No hernia is present. Musculoskeletal:         General: No tenderness or deformity. Cervical back: Normal range of motion and neck supple. Comments: Range of motion within normal limits x4 extremities   Skin:     General: Skin is warm. Coloration: Skin is not pale. Findings: No erythema or rash. Neurological:      Mental Status: She is alert and oriented to person, place, and time. Cranial Nerves: No cranial nerve deficit. Coordination: Coordination normal.   Psychiatric:         Behavior: Behavior normal.         Thought Content:  Thought content normal.         Labs reviewed today:  Lab Results   Component Value Date    WBC 8.68 08/23/2021    HGB 12.1 08/23/2021 HCT 37.9 08/23/2021    MCV 98.7 (H) 08/23/2021     08/23/2021     Lab Results   Component Value Date    NEUTROABS 5.27 08/23/2021         ASSESSMENT/PLAN:      1. 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 Initiated on preventive tamoxifen by Dr. Ernst Argueta on 6/21/2021. S/P simple left breast skin and nipple sparing mastectomy with left breast tissue expander and CARRIE drain by Dr. Ernst Argueta on 7/27/2021.     Tamoxifen was started in the preventative setting.  This of course is to prevent new disease developments over the future years in the right breast.    Tamoxifen placed on hold on 8/7/2021 due to provoked bilateral pulmonary emboli. It is preferred that she be off of tamoxifen which can be a prothrombotic agent until after all of that has been accomplished. Plan to readdress preventive therapy after completion of left breast reconstruction. At that time, if declared postmenopausal will consider initiating preventative Femara. Reports irregular menstrual cycles and the last menstrual cycle was in March 2021. She was seen in follow-up by KATHLEEN Samaniego with Dr. Ernst Argueta and he removed her CARRIE drain on 8/11/2021, she has a follow-up appointment on 8/25/2021 and is anticipating tissue expander fill. Priti Polanco is anticipating left breast abdominal flap reconstructive surgery in the near future.        Dr. Williamson Section discussed all of the above with Dr. Iliana Brody who is in agreement with the plan as outlined on 8/7/2021.     Again today, Priti Polanco verbalized understanding not to resume her tamoxifen and this will be readdressed after she has completed left breast reconstruction. From a hematology/oncology standpoint it is okay to move forward with reconstructive surgery after she has been anticoagulated for 90 days (11/6/2021). Recommend resuming anticoagulation as soon as appropriate after surgical procedure.       2.  Provoked bilateral pulmonary embolism, 8/6/2021. Currently anticoagulated with Eliquis 5 mg p.o. twice daily, reports compliant and denies any bleeding to include melena, epistaxis, hematuria or hematochezia. Mild shortness of air with exertion and denies any pleuritic chest discomfort. Noninvasive venous study of bilateral lower extremities  on 8/7/2021 with no evidence of DVT, SVT or reflux.     -Obtain JAK2 with reflex   -Continue Eliquis 5 mg p.o. twice daily, recommend at least 6 months of anticoagulation  -Will plan on switching to Lovenox approximately 4 days prior to planned surgery, so that Lovenox can be held per surgeon's recommendations       I discussed all of the above findings included in the assessment and plan with the patient and the patient is in agreement to move forward with current recommendations/treatment. I have addressed all of their questions and concerns that were verbalized. FOLLOW UP:  Follow-up appointment given for 6 weeks, sooner if needed  Continue to follow with other medical providers as recommended. Discussed precautions related to [address] and being at increased risk. Discussed proper handwashing to be done frequently, limit exposure to other individuals and maintain social distancing of 6 feet. Recommend contacting primary care provider if having respiratory symptoms for further recommendations and consideration for testing. EMR Dragon/Transcription disclaimer:   Much of this encounter note is an electronic transcription/translation of spoken language to printed text. The electronic translation of spoken language may permit erroneous, or at times, nonsensical words or phrases to be inadvertently transcribed; although attempts have made to review the note for such errors, some may still exist. Also, portions of this note have been copied forward, however, changed to reflect the most current clinical status of this patient. Jeffrey Solis, elias scribing for ENRIQUE Harrell.  Electronically signed by Ashly Lake RN on 8/23/2021 at 1045. I, ENRIQUE Greco personally performed the services described in this documentation as scribed by Ashly Lake RN in my presence and is both accurate and complete.       Electronically signed by ENRIQUE Alvarez on 8/31/2021 at 12:27 PM

## 2021-08-25 ENCOUNTER — OFFICE VISIT (OUTPATIENT)
Dept: SURGERY | Age: 51
End: 2021-08-25

## 2021-08-25 VITALS
WEIGHT: 190 LBS | TEMPERATURE: 98.5 F | HEIGHT: 65 IN | BODY MASS INDEX: 31.65 KG/M2 | SYSTOLIC BLOOD PRESSURE: 110 MMHG | DIASTOLIC BLOOD PRESSURE: 74 MMHG

## 2021-08-25 DIAGNOSIS — Z90.12 S/P LEFT MASTECTOMY: Primary | ICD-10-CM

## 2021-08-25 PROCEDURE — 99024 POSTOP FOLLOW-UP VISIT: CPT | Performed by: PHYSICIAN ASSISTANT

## 2021-08-27 ENCOUNTER — TELEPHONE (OUTPATIENT)
Dept: SURGERY | Age: 51
End: 2021-08-27

## 2021-08-27 NOTE — TELEPHONE ENCOUNTER
Called her plastic surgeon's office and  discussed with Gemma Solano that she is ready for deep flap reconstruction in November. This has been electronically signed by Bhavna Pickard.  Nery Tapia PA-C.

## 2021-08-27 NOTE — PROGRESS NOTES
Patient comes today in follow-up from left mastectomy with tissue expander reconstruction. She comes today for tissue expander fill. Patient Active Problem List    Diagnosis Date Noted    Bilateral pulmonary embolism ([address] 2021    S/P left mastectomy with expander reconstruction 2021    Intraductal carcinoma in situ of left breast 2021    Ductal carcinoma in situ (DCIS) of left breast 2021    Atypical ductal hyperplasia of breast 2021    Lump or mass in breast 2021     Current Outpatient Medications   Medication Sig Dispense Refill    Ascorbic Acid (VITAMIN C) 500 MG CAPS       Fluticasone Propionate (FLONASE ALLERGY RELIEF NA)       levonorgestrel (MIRENA, 52 MG,) IUD 52 mg       apixaban starter pack (ELIQUIS) 5 MG TBPK tablet Take 1 tablet by mouth See Admin Instructions 74 tablet 0    HYDROcodone-acetaminophen (NORCO) 5-325 MG per tablet Take 1 tablet by mouth every 6 hours as needed for Pain. (Patient not taking: Reported on 2021) 12 tablet 0    cetirizine (ZYRTEC) 10 MG tablet Take 10 mg by mouth daily      Probiotic Product (PROBIOTIC ADVANCED) CAPS Take 1 capsule by mouth daily      tamoxifen (NOLVADEX) 20 MG tablet Take 1 tablet by mouth daily (Patient not taking: Reported on 2021) 30 tablet 3    lisinopril (PRINIVIL;ZESTRIL) 5 MG tablet Take 0.5 tablets by mouth daily 45 tablet 1     No current facility-administered medications for this visit. Allergies: Patient has no known allergies.      Past Medical History:   Diagnosis Date    Cancer [facility])     DCIS     delivery delivered     Ductal carcinoma in situ (DCIS) of left breast 2021    History of D&C     1993    Hypertension      Past Surgical History:   Procedure Laterality Date     SECTION      DILATION AND CURETTAGE OF UTERUS      X2    MASTECTOMY Left 2021    LEFT NIPPLE SPARING MASTECTOMY VIA WISE PATTERN, TISSUE EXPANDER RECONSTRUCTION, PEC BLOCK performed by Chelo Gillis MD at [facility] LEFT Left 6/2/2021     BREAST NEEDLE BIOPSY LEFT 6/2/2021 [facility] GENERAL SURGERY     Family History   Problem Relation Age of Onset    Hypertension Mother     No Known Problems Father     Thyroid Disease Sister     Cancer Maternal Great Grandmother         Unknown     Social History     Tobacco Use    Smoking status: Never Smoker    Smokeless tobacco: Never Used   Substance Use Topics    Alcohol use: Never       Exam  Blood pressure 110/74, temperature 98.5 °F (36.9 °C), temperature source Temporal, height 5' 5\" (1.651 m), weight 190 lb (86.2 kg), not currently breastfeeding. On examination, her incision is looking well. There is some mild skin breakdown at the incision intersection. There is no evidence of infection. Impression  1)Status post left mastectomy with tissue expander reconstruction  2)New recent diagnosis of pulmonary emboli currently on Eliquis      Plan  We placed 60 cc in her tissue expander today and she tolerated the procedure well. She reports her hematologist/oncologist states she may had her deep flap reconstruction in November. We will call her plastic surgeon and ask her to call as well.

## 2021-08-31 ASSESSMENT — ENCOUNTER SYMPTOMS
VOMITING: 0
GASTROINTESTINAL NEGATIVE: 1
BACK PAIN: 0
BLOOD IN STOOL: 0
EYE PAIN: 0
DIARRHEA: 0
WHEEZING: 0
SORE THROAT: 0
SHORTNESS OF BREATH: 1
EYE REDNESS: 0
NAUSEA: 0
ABDOMINAL PAIN: 0
EYE DISCHARGE: 0
COUGH: 0
CONSTIPATION: 0
EYES NEGATIVE: 1

## 2021-09-01 ENCOUNTER — OFFICE VISIT (OUTPATIENT)
Dept: SURGERY | Age: 51
End: 2021-09-01

## 2021-09-01 VITALS
TEMPERATURE: 98.3 F | SYSTOLIC BLOOD PRESSURE: 120 MMHG | HEIGHT: 65 IN | DIASTOLIC BLOOD PRESSURE: 74 MMHG | WEIGHT: 191.4 LBS | BODY MASS INDEX: 31.89 KG/M2

## 2021-09-01 DIAGNOSIS — Z90.12 S/P LEFT MASTECTOMY: Primary | ICD-10-CM

## 2021-09-01 PROCEDURE — 99024 POSTOP FOLLOW-UP VISIT: CPT | Performed by: PHYSICIAN ASSISTANT

## 2021-09-02 NOTE — PROGRESS NOTES
Patient comes today in follow-up from left mastectomy with tissue expander reconstruction. She comes today for tissue expander fill. Patient Active Problem List    Diagnosis Date Noted    Bilateral pulmonary embolism ([address] 2021    S/P left mastectomy with expander reconstruction 2021    Intraductal carcinoma in situ of left breast 2021    Ductal carcinoma in situ (DCIS) of left breast 2021    Atypical ductal hyperplasia of breast 2021    Lump or mass in breast 2021     Current Outpatient Medications   Medication Sig Dispense Refill    apixaban (ELIQUIS) 5 MG TABS tablet Take 1 tablet by mouth 2 times daily 60 tablet 1    Ascorbic Acid (VITAMIN C) 500 MG CAPS       Fluticasone Propionate (FLONASE ALLERGY RELIEF NA)       levonorgestrel (MIRENA, 52 MG,) IUD 52 mg       cetirizine (ZYRTEC) 10 MG tablet Take 10 mg by mouth daily      Probiotic Product (PROBIOTIC ADVANCED) CAPS Take 1 capsule by mouth daily      lisinopril (PRINIVIL;ZESTRIL) 5 MG tablet Take 0.5 tablets by mouth daily 45 tablet 1     No current facility-administered medications for this visit. Allergies: Patient has no known allergies.      Past Medical History:   Diagnosis Date    Cancer [facility])     DCIS     delivery delivered     Ductal carcinoma in situ (DCIS) of left breast 2021    History of D&C     1993    Hypertension      Past Surgical History:   Procedure Laterality Date     SECTION      DILATION AND CURETTAGE OF UTERUS      X2    MASTECTOMY Left 2021    LEFT NIPPLE SPARING MASTECTOMY VIA WISE PATTERN, TISSUE EXPANDER RECONSTRUCTION, PEC BLOCK performed by Concepcion Sheikh MD at [facility] LEFT Left 2021     BREAST NEEDLE BIOPSY LEFT 2021 [facility] GENERAL SURGERY     Family History   Problem Relation Age of Onset    Hypertension Mother     No Known Problems Father     Thyroid Disease Sister     Cancer Maternal Great Grandmother         Unknown     Social History     Tobacco Use    Smoking status: Never Smoker    Smokeless tobacco: Never Used   Substance Use Topics    Alcohol use: Never       Exam  Blood pressure 120/74, temperature 98.3 °F (36.8 °C), temperature source Temporal, height 5' 5\" (1.651 m), weight 191 lb 6.4 oz (86.8 kg), not currently breastfeeding. On examination, her incision is looking well. There is some mild skin breakdown at the incision intersection. There is no evidence of infection. Impression  1)Status post left mastectomy with tissue expander reconstruction  2)New recent diagnosis of pulmonary emboli currently on Eliquis      Plan  We placed 120 cc in her tissue expander today and she tolerated the procedure well. She has a small amount of wound breakdown less than a centimeter in size at the junction of the Wise pattern incisions. There is no evidence of infection. Pictures of this has been taken and we have discussed the care with Dr. Myah Macdonald. We will plan to see her back in the office next week for evaluation.

## 2021-09-07 NOTE — PROGRESS NOTES
HISTORY OF PRESENT ILLNESS:    Ms. Jacey Puentes presents today for a post op breast check. She is recently status post ultrasound guided breast biopsy  on the left which revealed a tiny micropapillary ductal   carcinoma in situ. ER is positive at 94%. MRI-5/26/2021  FINDINGS:  There is a large 8.5 x 3.3 x 5.4 cm area of non-mass enhancement in   the LEFT upper outer breast, located along the lateral and inferior   aspect of the biopsy clip marker. No focal suspicious solid mass. No   mass adjacent to the biopsy clip marker. No abnormal LEFT breast skin   thickening or nipple enhancement. No abnormal enhancement or suspicious mass in the RIGHT breast. No   abnormal RIGHT breast skin thickening or nipple enhancement. No enlarged axillary or internal mammary lymph nodes. Limited   evaluation of the anterior chest and upper abdomen is unremarkable. No   suspicious osseous lesions.       Impression   Impression:   1.  Large 8.5 x 3.3 x 5.4 cm area of regional non-mass enhancement in   the LEFT upper outer breast, with differential diagnosis including   DCIS. 2.  No suspicious focal mass in either breast. No focal mass adjacent   to the LEFT breast biopsy clip marker. 3.  No axillary or internal mammary lymphadenopathy. BI-RADS CATEGORY 6: KNOWN BIOPSY WITH PROVEN MALIGNANCY. Signed by Dr Abigail Daniels on 5/26/2021 8:20 AM     Review of her MRI and her mammogram demonstrated an area of significant concern which is distinctly separate from the site of her initial biopsy. 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 This area however spreads over an 8 cm portion of her breast    She is now status post left nipple sparing mastectomy with immediate tissue expander reconstruction on 7/27/2021.          PATHOLOGY REVEALS:  FINAL DIAGNOSIS:     A.  Breast, left skin and nipple sparing mastectomy:   1.  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 2.  In situ carcinoma demonstrates extensive intraluminal mucin   production. 3.  In situ carcinoma measures at least 8.0 cm in greatest dimension. 4.  In situ carcinoma extends to within 0.1 cm of the deep surgical   excision margin and 0.4 cm of the anterior margin. 5.  Negative for invasive carcinoma. B.  Breast, excision of additional breast tissue from beneath nipple:   Benign breast parenchyma. C.  Breast, excision of additional left breast anterior margin: Benign   breast parenchyma. AJCC STAGE: pTis, pNx, pMx     She subsequently had a pulmonary embolus and is now on Eliquis for 3 months. We have been doing her fills in lieu of proceeding with her autologous reconstruction. I have discussed her at length with Walter Breaux. PHYSICAL EXAM:  The  wounds look good with no evidence of infection, fluid accumulation, or skin necrosis. She still needs additional fills. IMPRESSION:    Doing well s/p left nipple sparing mastectomy   Pulmonary embolus on Eliquis    PLAN: I will see her back in the office for exam on 10/22/2021. She will call us with any new concerns. She should be about ready for her Shana flap by then      I have seen, examined and reviewed this patient medication list, appropriate labs and imaging studies. I reviewed relevant medical records and others physicians notes. I discussed the plans of care with the patient. I answered all the questions to the patients satisfaction. I, Dr Taryn Garber, personally performed the services described in this documentation as scribed by Cayla Gaxiola MA in my presence and is both accurate and complete.      (Please note that portions of this note were completed with a voice recognition program. Efforts were made to edit the dictations but occasionally words are mis-transcribed.)  Over 50% of the total visit time of  20 minutes in face to face encounter with the patient, out of which more than 50% of the time was spent in counseling patient or family and coordination of care. Counseling included but was not limited to time spent reviewing labs, imaging studies/ treatment plan and answering questions.

## 2021-09-08 ENCOUNTER — OFFICE VISIT (OUTPATIENT)
Dept: SURGERY | Age: 51
End: 2021-09-08

## 2021-09-08 VITALS
DIASTOLIC BLOOD PRESSURE: 78 MMHG | BODY MASS INDEX: 31.82 KG/M2 | TEMPERATURE: 98.4 F | WEIGHT: 191 LBS | HEIGHT: 65 IN | SYSTOLIC BLOOD PRESSURE: 120 MMHG

## 2021-09-08 DIAGNOSIS — Z90.12 S/P LEFT MASTECTOMY: Primary | ICD-10-CM

## 2021-09-08 PROCEDURE — 99024 POSTOP FOLLOW-UP VISIT: CPT | Performed by: PHYSICIAN ASSISTANT

## 2021-09-09 NOTE — PROGRESS NOTES
Grandmother         Unknown     Social History     Tobacco Use    Smoking status: Never Smoker    Smokeless tobacco: Never Used   Substance Use Topics    Alcohol use: Never       Exam  Blood pressure 120/78, temperature 98.4 °F (36.9 °C), temperature source Temporal, height 5' 5\" (1.651 m), weight 191 lb (86.6 kg), not currently breastfeeding. On examination, her incision is looking well. There is some mild skin breakdown at the incision intersection. There is no evidence of infection. The area is unchanged from last week's exam    Impression  1)Status post left mastectomy with tissue expander reconstruction  2)New recent diagnosis of pulmonary emboli currently on Eliquis      Plan  We will plan to see her back in the office next week for repeat evaluation and possible fill.

## 2021-09-13 ENCOUNTER — OFFICE VISIT (OUTPATIENT)
Dept: SURGERY | Age: 51
End: 2021-09-13

## 2021-09-13 VITALS — DIASTOLIC BLOOD PRESSURE: 70 MMHG | HEART RATE: 80 BPM | SYSTOLIC BLOOD PRESSURE: 134 MMHG

## 2021-09-13 DIAGNOSIS — Z90.12 S/P LEFT MASTECTOMY: ICD-10-CM

## 2021-09-13 DIAGNOSIS — D05.12 DUCTAL CARCINOMA IN SITU (DCIS) OF LEFT BREAST: Primary | ICD-10-CM

## 2021-09-13 PROCEDURE — 99024 POSTOP FOLLOW-UP VISIT: CPT | Performed by: SURGERY

## 2021-09-15 RX ORDER — ERGOCALCIFEROL 1.25 MG/1
50000 CAPSULE ORAL WEEKLY
Qty: 12 CAPSULE | Refills: 0 | Status: SHIPPED | OUTPATIENT
Start: 2021-09-15 | End: 2021-12-10

## 2021-10-13 ENCOUNTER — OFFICE VISIT (OUTPATIENT)
Dept: HEMATOLOGY | Age: 51
End: 2021-10-13
Payer: COMMERCIAL

## 2021-10-13 ENCOUNTER — HOSPITAL ENCOUNTER (OUTPATIENT)
Dept: INFUSION THERAPY | Age: 51
Discharge: HOME | End: 2021-10-13
Payer: COMMERCIAL

## 2021-10-13 VITALS
HEIGHT: 65 IN | SYSTOLIC BLOOD PRESSURE: 124 MMHG | WEIGHT: 191.1 LBS | DIASTOLIC BLOOD PRESSURE: 68 MMHG | BODY MASS INDEX: 31.84 KG/M2 | HEART RATE: 92 BPM | OXYGEN SATURATION: 100 %

## 2021-10-13 DIAGNOSIS — D05.12 DUCTAL CARCINOMA IN SITU (DCIS) OF LEFT BREAST: ICD-10-CM

## 2021-10-13 DIAGNOSIS — I26.99 BILATERAL PULMONARY EMBOLISM (HCC): Primary | ICD-10-CM

## 2021-10-13 DIAGNOSIS — I26.99 BILATERAL PULMONARY EMBOLISM (HCC): ICD-10-CM

## 2021-10-13 DIAGNOSIS — D05.12 INTRADUCTAL CARCINOMA IN SITU OF LEFT BREAST: ICD-10-CM

## 2021-10-13 LAB
BASOPHILS ABSOLUTE: 0.03 K/UL (ref 0.01–0.08)
BASOPHILS RELATIVE PERCENT: 0.3 % (ref 0.1–1.2)
EOSINOPHILS ABSOLUTE: 0.25 K/UL (ref 0.04–0.54)
EOSINOPHILS RELATIVE PERCENT: 2.6 % (ref 0.7–7)
HCT VFR BLD CALC: 41.8 % (ref 34.1–44.9)
HEMOGLOBIN: 13.8 G/DL (ref 11.2–15.7)
LYMPHOCYTES ABSOLUTE: 1.97 K/UL (ref 1.18–3.74)
LYMPHOCYTES RELATIVE PERCENT: 20.2 % (ref 19.3–53.1)
MCH RBC QN AUTO: 31.6 PG (ref 25.6–32.2)
MCHC RBC AUTO-ENTMCNC: 33 G/DL (ref 32.3–35.5)
MCV RBC AUTO: 95.7 FL (ref 79.4–94.8)
MONOCYTES ABSOLUTE: 0.69 K/UL (ref 0.24–0.82)
MONOCYTES RELATIVE PERCENT: 7.1 % (ref 4.7–12.5)
NEUTROPHILS ABSOLUTE: 6.8 K/UL (ref 1.56–6.13)
NEUTROPHILS RELATIVE PERCENT: 69.8 % (ref 34–71.1)
PDW BLD-RTO: 12.6 % (ref 11.7–14.4)
PLATELET # BLD: 246 K/UL (ref 182–369)
PMV BLD AUTO: 10.2 FL (ref 7.4–10.4)
RBC # BLD: 4.37 M/UL (ref 3.93–5.22)
WBC # BLD: 9.74 K/UL (ref 3.98–10.04)

## 2021-10-13 PROCEDURE — 99214 OFFICE O/P EST MOD 30 MIN: CPT | Performed by: NURSE PRACTITIONER

## 2021-10-13 PROCEDURE — 99211 OFF/OP EST MAY X REQ PHY/QHP: CPT

## 2021-10-13 PROCEDURE — 85025 COMPLETE CBC W/AUTO DIFF WBC: CPT

## 2021-10-13 ASSESSMENT — ENCOUNTER SYMPTOMS
NAUSEA: 0
BACK PAIN: 0
SORE THROAT: 0
BLOOD IN STOOL: 0
VOMITING: 0
DIARRHEA: 0
GASTROINTESTINAL NEGATIVE: 1
EYE DISCHARGE: 0
CONSTIPATION: 0
ABDOMINAL PAIN: 0
EYE PAIN: 0
EYE REDNESS: 0
EYES NEGATIVE: 1
COUGH: 0
WHEEZING: 0

## 2021-10-13 NOTE — PROGRESS NOTES
Chanda Esquivel on 10/22/2021. JAK2 V617F flex to CA LR/exon 12-15/MPL was negative on 9/1/2021       ONCOLOGIC HISTORY:  Ductal Carcinoma in Situ of the left breast, ER 94%, stage 0, pTis, pNx, pMx  Gloria Crocker was seen in initial medical oncology and hematology consultation on 8/7/2021 regarding bilateral pulmonary emboli in the setting of a recent left mastectomy for DCIS.   Gloria Crocker established care and a well visit with [facility], [facility]AY on 4/13/2021.  A Pap smear was completed and she was incidentally noted to have an elevated blood pressure and placed on lisinopril with recommendations for close monitoring of BP.  Orders were also given for routine screening arrangements were made for bilateral mammogram.     Bilateral mammogram at [facility] on 4/19/2021  · Left breast with multiple groups of calcifications, which have increased in size and number compared to 07/14/2014. · No mammographic evidence of malignancy in the right breast.      Left mammogram with CAD at [facility] on 4/23/2021  · Multiple similar appearing groups of indeterminate calcifications in   the left breast. Recommend stereotactic left breast biopsy.      Ultrasound-guided left breast biopsy at [facility] on 4/30/2021   · Pathology indicating atypical ductal hyperplasia.    · Tissue was sent to Dr. Ashlee Alex for consultation and she reported \"the changes are concerning for low-grade ductal carcinoma in situ, but are insufficiently developed to establish the diagnosis and the sample.  Final classification should be made on excisional biopsy specimen\".     MRI of bilateral breast on 5/26/2021 at [facility]  · Large 8.5 x 3.3 x 5.4 cm area of regional non-mass enhancement in the LEFT upper outer breast, with differential diagnosis including DCIS. · No suspicious focal mass in either breast. No focal mass adjacentto the LEFT breast biopsy clip marker.   · No axillary or internal mammary lymphadenopathy        Amy Ross was seen in initial consultation by Mariette Gaucher, PA on 5/21/2021 followed by an appointment with Dr. Stuart Jimenez on 5/27/2021.     Left breast biopsy on 6/2/2021 by Dr. Stuart Jimenez  · Micropapillary ductal carcinoma in situ.   · Negative for invasion  · ER is positive at 94%.       Seen by Dr. Stuart Jimenez on 6/21/2021 and initiated on preventive tamoxifen 20 mg PO.       Left nipple sparing mastectomy with immediate tissue expander reconstruction on 7/27/2021 by Dr. Stuart Jimenez  Pathology:   1.  Extensive low-grade ductal carcinoma in situ (micropapillary and cribriform types). 2.  In situ carcinoma demonstrates extensive intraluminal mucin production. 3.  In situ carcinoma measures at least 8.0 cm in greatest dimension. 4.  In situ carcinoma extends to within 0.1 cm of the deep surgical excision margin and 0.4 cm of the anterior margin. 5.  Negative for invasive carcinoma. AJCC STAGE: pTis, pNx, pMx        Dr. Teja José discussed the following with Dr. Brett Hernandes who is in agreement with holding tamoxifen until after completion of left breast reconstruction. At that time if declared postmenopausal can consider initiating preventative Femara or will consider resuming tamoxifen. Tamoxifen was started in the preventative setting. This of course is to prevent new disease developments over the future years in the right breast.  Although Amy Ross will be on anticoagulation, she does have future surgeries scheduled for reconstruction. Dr. Teja José prefered that she be off of tamoxifen which can be a prothrombotic agent until after all of that has been accomplished.     Recommend stopping Tamoxifen due to thrombotic risk factor on 8/7/2021  The issue of preventive therapy will be addressed after completion of left breast reconstruction.    At that time, if declared postmenopausal will consider initiating preventative Femara.   Reports irregular menstrual cycles and the last menstrual cycle was in March 2021.        Treatment summary:  · Initiated on preventative tamoxifen 20 mg on 6/21/2021  · Left nipple sparing mastectomy with immediate tissue expander reconstruction on 7/27/2021 by Dr. Abigail Walsh  · Preventative tamoxifen held on 8/7/2021       HEMATOLOGIC HISTORY:  Provoked bilateral pulmonary emboli, 8/6/2021 (suspect secondary to tamoxifen and surgical intervention)  Jonah Allan was admitted to [facility] to the ED on 8/6/2021 with bilateral pulmonary emboli.   Jonah Allan presented to [address] [place] [address] ED with right-sided sharp pleuritic chest discomfort over the previous 12-24 hours. The pain was a stabbing knifelike sensation    CTA of the chest with contrast on 8/6/2021  · Small to moderate amount of pulmonary embolus greatest in the right lower lobe pulmonary artery branches. There is bilateral pulmonary embolus. · No definite CT evidence of right heart strain.   · Mild cardiomegaly. · Focal parenchymal consolidation in the right lower lobe with surrounding groundglass opacity in the same distribution as thegreatest amount of pulmonary embolus. This may represent a pulmonary infarct.     PT 14.0/INR 1.06 and aPTT 21.3 on 8/6/2021  COVID-19, rapid negative on 8/6/2021     Jonah Allan was placed on on heparin drip and admitted for further evaluation and recommendations.     Noninvasive venous study of bilateral lower extremities  on 8/7/2021 with no evidence of DVT, SVT or reflux.     Serology studies on 8/7/2021  · Anticardiolipin IgA 1.5, IgG 1.3, IgM 3.8 -negative  · Beta 2 Glycoprotein IgG 0, IgM 2 (0-20)  · Cardiolipin antibodies IgG <10, IgM <10 (0-14)  · Protein C Ag >95 (%)  · Protein S Ag 107 (%)  · Antithrombin activity 92 ()  · Antithrombin Ag 76 ()  · Lupus anticoagulant-  not detected  · Factor V Leiden - negative  · Prothrombin gene mutation - negative  · MTHFR: compound heterozygous Mutation C677T and Mutation [id number] (may be associated with a mild decrease in MTHFR enzyme activity although clinically insignificant and most likely not a factor recent finding of pulmonary emboli)   · Homocysteine 5 (0-15)    Heparin discontinued and initiated Eliquis 10 mg p.o. twice daily x7 days followed by Eliquis 5 mg p.o. twice daily on 2021.     JAK2 V617F flex to CA LR/exon 12-15/MPL was negative on 2021    Past Medical History:    Past Medical History:   Diagnosis Date    Cancer [facility])     DCIS     delivery delivered     Ductal carcinoma in situ (DCIS) of left breast 2021    History of D&C     1993    Hypertension        Past Surgical History:    Past Surgical History:   Procedure Laterality Date     SECTION      DILATION AND CURETTAGE OF UTERUS      X2    MASTECTOMY Left 2021    LEFT NIPPLE SPARING MASTECTOMY VIA WISE PATTERN, TISSUE EXPANDER RECONSTRUCTION, PEC BLOCK performed by Mik Kimble MD at [facility] LEFT Left 2021     BREAST NEEDLE BIOPSY LEFT 2021 [facility] SURGERY       Current Medications:    Current Outpatient Medications   Medication Sig Dispense Refill    vitamin D (ERGOCALCIFEROL) 1.25 MG (00171 UT) CAPS capsule Take 1 capsule by mouth once a week 12 capsule 0    apixaban (ELIQUIS) 5 MG TABS tablet Take 1 tablet by mouth 2 times daily 60 tablet 1    Ascorbic Acid (VITAMIN C) 500 MG CAPS       Fluticasone Propionate (FLONASE ALLERGY RELIEF NA)       levonorgestrel (MIRENA, 52 MG,) IUD 52 mg       cetirizine (ZYRTEC) 10 MG tablet Take 10 mg by mouth daily      Probiotic Product (PROBIOTIC ADVANCED) CAPS Take 1 capsule by mouth daily      lisinopril (PRINIVIL;ZESTRIL) 5 MG tablet Take 0.5 tablets by mouth daily 45 tablet 1     No current facility-administered medications for this visit.         Allergies: No Known Allergies    Social History:    Social History     Tobacco Use    Smoking status: Never Smoker    Smokeless tobacco: Never Used   Vaping Use    Vaping Use: Never used   Substance Use Topics    Alcohol use: Never    Drug use: Never       Family History:   Family History   Problem Relation Age of Onset    Hypertension Mother     No Known Problems Father     Thyroid Disease Sister     Cancer Maternal Great Grandmother         Unknown       Vitals:  Vitals:    10/13/21 1414   BP: 124/68   Pulse: 92   SpO2: 100%   Weight: 191 lb 1.6 oz (86.7 kg)   Height: 5' 5\" (1.651 m)        Subjective   REVIEW OF SYSTEMS:   Review of Systems   Constitutional: Negative. Negative for chills, diaphoresis and fever. HENT: Negative. Negative for congestion, ear pain, hearing loss, nosebleeds, sore throat and tinnitus. Eyes: Negative. Negative for pain, discharge and redness. Respiratory: Negative. Negative for cough, shortness of breath and wheezing. Cardiovascular: Negative. Negative for chest pain, palpitations and leg swelling. Gastrointestinal: Negative. Negative for abdominal pain, blood in stool, constipation, diarrhea, nausea and vomiting. Endocrine: Positive for heat intolerance (Hot flashes). Negative for polydipsia. Genitourinary: Negative for dysuria, flank pain, frequency, hematuria and urgency. Musculoskeletal: Negative. Negative for back pain, myalgias and neck pain. Skin: Negative. Negative for rash. Neurological: Negative. Negative for dizziness, tremors, seizures, weakness and headaches. Hematological: Does not bruise/bleed easily. Psychiatric/Behavioral: Negative. The patient is not nervous/anxious. Objective   PHYSICAL EXAM:  Physical Exam  Vitals reviewed. Constitutional:       General: She is not in acute distress. Appearance: She is well-developed. She is not diaphoretic. HENT:      Head: Normocephalic and atraumatic. Mouth/Throat:      Pharynx: Uvula midline. Tonsils: No tonsillar exudate. Eyes:      General: Lids are normal. No scleral icterus. Right eye: No discharge. Left eye: No discharge. Conjunctiva/sclera: Conjunctivae normal.      Pupils: Pupils are equal, round, and reactive to light. Neck:      Thyroid: No thyroid mass or thyromegaly. Vascular: No JVD. Trachea: Trachea normal. No tracheal deviation. Cardiovascular:      Rate and Rhythm: Normal rate and regular rhythm. Heart sounds: Normal heart sounds. No murmur heard. No friction rub. No gallop. Pulmonary:      Effort: Pulmonary effort is normal. No respiratory distress. Breath sounds: Normal breath sounds. No wheezing or rales. Chest:      Chest wall: No tenderness. Abdominal:      General: Bowel sounds are normal. There is no distension. Palpations: Abdomen is soft. There is no mass. Tenderness: There is no abdominal tenderness. There is no guarding or rebound. Hernia: No hernia is present. Musculoskeletal:         General: No tenderness or deformity. Cervical back: Normal range of motion and neck supple. Comments: Range of motion within normal limits x4 extremities   Skin:     General: Skin is warm. Coloration: Skin is not pale. Findings: No erythema or rash. Neurological:      Mental Status: She is alert and oriented to person, place, and time. Cranial Nerves: No cranial nerve deficit. Coordination: Coordination normal.   Psychiatric:         Behavior: Behavior normal.         Thought Content: Thought content normal.         Labs reviewed today:  Lab Results   Component Value Date    WBC 9.74 10/13/2021    HGB 13.8 10/13/2021    HCT 41.8 10/13/2021    MCV 95.7 (H) 10/13/2021     10/13/2021     Lab Results   Component Value Date    NEUTROABS 6.80 (H) 10/13/2021         ASSESSMENT/PLAN:      1. 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 Initiated on preventive tamoxifen by Dr. Blayne Li on 6/21/2021.   S/P simple left breast skin and nipple sparing mastectomy with left breast tissue expander and CARRIE drain by  Mal Eason on 7/27/2021. Tamoxifen placed on hold on 8/7/2021 due to provoked bilateral pulmonary emboli. It is preferred that she be off of tamoxifen which can be a prothrombotic agent until after all of that has been accomplished. Plan to readdress preventive therapy after completion of left breast reconstruction. At that time, if declared postmenopausal will consider initiating preventative Femara. Reports irregular menstrual cycles and the last menstrual cycle was in March 2021. PATRICIA is anticipating left breast abdominal flap reconstructive surgery in the near future and has a follow-up with Dr. Baylee Ray on 10/20/2021. If Dr. Baylee Ray decides to move forward with reconstruction she could be placed on Lovenox in place of Eliquis for surgical intervention. She also has a scheduled follow-up with Dr. Mal Eason and. ENRIQUE Mccauley on 10/22/2021. From a hematology/oncology standpoint it is okay to move forward with reconstructive surgery after she has been anticoagulated for 90 days (11/6/2021). Recommend resuming anticoagulation as soon as appropriate after surgical procedure. Will recommend the use of therapeutic Lovenox prior to surgery and can be held per surgeons discretion, usually 24 hours prior to surgery.    -Continue to HOLD tamoxifen      2. Provoked bilateral pulmonary embolism, 8/6/2021. Currently anticoagulated with Eliquis 5 mg p.o. twice daily. Reports compliant and denies any bleeding to include melena, epistaxis, hematuria or hematochezia. Denies any excessive bruising. Denies shortness of air or chest pain/discomfort.   JAK2 V617F flex to CA LR/exon 12-15/MPL was negative on 9/1/2021     -Continue Eliquis 5 mg p.o. twice daily, recommend at least 6 months of anticoagulation  -Will plan on switching to Lovenox approximately 4 days prior to planned surgery, so that Lovenox can be held per surgeon's recommendations       I discussed all of the above findings included in the assessment and plan with the patient and the patient is in agreement to move forward with current recommendations/treatment. I have addressed all of their questions and concerns that were verbalized. FOLLOW UP:  Follow-up appointment given for 7 weeks, sooner if needed  Continue to follow with other medical providers as recommended. EMR Dragon/Transcription disclaimer:   Much of this encounter note is an electronic transcription/translation of spoken language to printed text. The electronic translation of spoken language may permit erroneous, or at times, nonsensical words or phrases to be inadvertently transcribed; although attempts have made to review the note for such errors, some may still exist.  Please excuse any unrecognized transcription errors and contact us if the air is unintelligible or needs documented correction. Also, portions of this note have been copied forward, however, changed to reflect the most current clinical status of this patient.       Electronically signed by ENRIQUE Lindsay on 10/21/2021 at 2:09 PM

## 2021-10-20 NOTE — PROGRESS NOTES
HISTORY OF PRESENT ILLNESS:    Ms. Cezar Flores presents today for a one month breast exam.    She is recently status post ultrasound guided breast biopsy  on the left which revealed a tiny micropapillary ductal   carcinoma in situ. ER is positive at 94%. MRI-5/26/2021  FINDINGS:  There is a large 8.5 x 3.3 x 5.4 cm area of non-mass enhancement in   the LEFT upper outer breast, located along the lateral and inferior   aspect of the biopsy clip marker. No focal suspicious solid mass. No   mass adjacent to the biopsy clip marker. No abnormal LEFT breast skin   thickening or nipple enhancement. No abnormal enhancement or suspicious mass in the RIGHT breast. No   abnormal RIGHT breast skin thickening or nipple enhancement. No enlarged axillary or internal mammary lymph nodes. Limited   evaluation of the anterior chest and upper abdomen is unremarkable. No   suspicious osseous lesions.       Impression   Impression:   1.  Large 8.5 x 3.3 x 5.4 cm area of regional non-mass enhancement in   the LEFT upper outer breast, with differential diagnosis including   DCIS. 2.  No suspicious focal mass in either breast. No focal mass adjacent   to the LEFT breast biopsy clip marker. 3.  No axillary or internal mammary lymphadenopathy. BI-RADS CATEGORY 6: KNOWN BIOPSY WITH PROVEN MALIGNANCY. Signed by Dr Moustapha Niño on 5/26/2021 8:20 AM     Review of her MRI and her mammogram demonstrated an area of significant concern which is distinctly separate from the site of her initial biopsy. 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 This area however spreads over an 8 cm portion of her breast    She is now status post left nipple sparing mastectomy with immediate tissue expander reconstruction on 7/27/2021.          PATHOLOGY REVEALS:  FINAL DIAGNOSIS:     A.  Breast, left skin and nipple sparing mastectomy:   1.  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 2.  In situ carcinoma demonstrates extensive intraluminal mucin   production. 3.  In situ carcinoma measures at least 8.0 cm in greatest dimension. 4.  In situ carcinoma extends to within 0.1 cm of the deep surgical   excision margin and 0.4 cm of the anterior margin. 5.  Negative for invasive carcinoma. B.  Breast, excision of additional breast tissue from beneath nipple:   Benign breast parenchyma. C.  Breast, excision of additional left breast anterior margin: Benign   breast parenchyma. AJCC STAGE: pTis, pNx, pMx     She subsequently had a pulmonary embolus and is now on Eliquis for 3 months. We have been doing her fills in lieu of proceeding with her autologous reconstruction. I have discussed her at length with Walter Ray. PHYSICAL EXAM:  The  wounds look good with no evidence of infection, fluid accumulation, or skin necrosis. She still needs additional fills. IMPRESSION:    Doing well s/p left nipple sparing mastectomy   Pulmonary embolus on Eliquis    PLAN: Follow-up in January,? More fills, she is due for her final reconstructive surgery in February      I have seen, examined and reviewed this patient medication list, appropriate labs and imaging studies. I reviewed relevant medical records and others physicians notes. I discussed the plans of care with the patient. I answered all the questions to the patients satisfaction. I, Dr Coleman Woods, personally performed the services described in this documentation as scribed by Lamont Hightower MA in my presence and is both accurate and complete.      (Please note that portions of this note were completed with a voice recognition program. Efforts were made to edit the dictations but occasionally words are mis-transcribed.)  Over 50% of the total visit time of  20 minutes in face to face encounter with the patient, out of which more than 50% of the time was spent in counseling patient or family and coordination of care. Counseling included but was not limited to time spent reviewing labs, imaging studies/ treatment plan and answering questions. Home

## 2021-10-21 ASSESSMENT — ENCOUNTER SYMPTOMS
SHORTNESS OF BREATH: 0
RESPIRATORY NEGATIVE: 1

## 2021-10-25 ENCOUNTER — OFFICE VISIT (OUTPATIENT)
Dept: SURGERY | Age: 51
End: 2021-10-25

## 2021-10-25 VITALS
WEIGHT: 192.8 LBS | BODY MASS INDEX: 30.98 KG/M2 | SYSTOLIC BLOOD PRESSURE: 130 MMHG | HEIGHT: 66 IN | TEMPERATURE: 98 F | DIASTOLIC BLOOD PRESSURE: 74 MMHG

## 2021-10-25 DIAGNOSIS — D05.12 DUCTAL CARCINOMA IN SITU (DCIS) OF LEFT BREAST: Primary | ICD-10-CM

## 2021-10-25 DIAGNOSIS — Z90.12 S/P LEFT MASTECTOMY: ICD-10-CM

## 2021-10-25 PROCEDURE — 99024 POSTOP FOLLOW-UP VISIT: CPT | Performed by: SURGERY

## 2021-10-28 ENCOUNTER — OFFICE VISIT (OUTPATIENT)
Dept: PRIMARY CARE CLINIC | Age: 51
End: 2021-10-28
Payer: COMMERCIAL

## 2021-10-28 VITALS
BODY MASS INDEX: 32.15 KG/M2 | WEIGHT: 193 LBS | TEMPERATURE: 97.9 F | SYSTOLIC BLOOD PRESSURE: 110 MMHG | OXYGEN SATURATION: 97 % | DIASTOLIC BLOOD PRESSURE: 70 MMHG | HEIGHT: 65 IN | HEART RATE: 90 BPM

## 2021-10-28 DIAGNOSIS — I26.94 MULTIPLE SUBSEGMENTAL PULMONARY EMBOLI WITHOUT ACUTE COR PULMONALE (HCC): Primary | ICD-10-CM

## 2021-10-28 DIAGNOSIS — D05.12 DUCTAL CARCINOMA IN SITU (DCIS) OF LEFT BREAST: ICD-10-CM

## 2021-10-28 DIAGNOSIS — R87.612 LGSIL OF CERVIX OF UNDETERMINED SIGNIFICANCE: ICD-10-CM

## 2021-10-28 DIAGNOSIS — E55.9 VITAMIN D DEFICIENCY: ICD-10-CM

## 2021-10-28 LAB — VITAMIN D 25-HYDROXY: 66.8 NG/ML

## 2021-10-28 PROCEDURE — 99214 OFFICE O/P EST MOD 30 MIN: CPT | Performed by: NURSE PRACTITIONER

## 2021-10-28 ASSESSMENT — ENCOUNTER SYMPTOMS
RESPIRATORY NEGATIVE: 1
EYES NEGATIVE: 1
GASTROINTESTINAL NEGATIVE: 1

## 2021-10-28 NOTE — PROGRESS NOTES
Formerly [facility] PHYSICIAN SERVICES  [facility] [address] [address] [address] [place] [address]  Dept: [phone]  Dept Fax: [phone]  Loc: [phone]    Stacy Perry is a 54 y.o. female who presents today for her medical conditions/complaints as noted below. Stacy Perry is c/o of Other (repeat pap - )        HPI:     HPI   Chief Complaint   Patient presents with    Other     repeat pap -    For annual physical in April and she had a Pap smear. She had a low-grade with a negative HPV. 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 She did have the breast removed and is seeing Dr. Abigail Walsh and Dr. Marco Antonio Coker. Shortly after she had the breast removed she went to the emergency room with shortness of breath and was found to have a blood clot in her lungs. She was started on Eliquis for blood clots. She had a complete lab work-up through Dr. Marco Antonio Coker and they could not find any blood clotting disorders. They believe the blood clot was related to the surgery. She is going to have to come off of the blood thinner soon after her 6 months of being on it. Before the plastic surgeon in [place] will operate on her they want her to have a repeat CTA to make sure the blood clots in her lungs are clear. She also still has a Mirena and they have mentioned that she needs to have it out. I offered to take it out today but she wants to meet with a GYN and discuss whether she could get a copper IUD. She nor her  have anything permanent to have sterilization. She has seen Dr. Alice Schneiedr at [facility] in the past for GYN and she has moved. She is also had abnormal Pap smears in the past.  The reason she is here today is to repeat the Pap smear  from 6 months ago because it was abnormal.  Except for the blood clot she is feeling much better and looking forward to having her breast reconstruction surgery as soon as she can.   Past Medical History:   Diagnosis Date    Cancer [facility])     DCIS     delivery delivered     Ductal carcinoma in situ (DCIS) of left breast 2021    History of D&C     1993    Hypertension       Past Surgical History:   Procedure Laterality Date     SECTION      DILATION AND CURETTAGE OF UTERUS      X2    MASTECTOMY Left 2021    LEFT NIPPLE SPARING MASTECTOMY VIA WISE PATTERN, TISSUE EXPANDER RECONSTRUCTION, PEC BLOCK performed by Karoline Escobar MD at [facility] [address] LEFT Left 2021    US BREAST NEEDLE BIOPSY LEFT 2021 [facility]       Vitals 10/28/2021 10/25/2021 10/13/2021 2021 2021 8050   SYSTOLIC 544 122 200 [phone]   DIASTOLIC 70 74 68 70 78 74   Site - Left Upper Arm - Right Upper Arm Left Upper Arm Left Upper Arm   Position - Sitting - Sitting Sitting Sitting   Cuff Size - Medium Adult - Medium Adult Medium Adult Large Adult   Pulse 90 - 92 80 - -   Temp 97.9 98 - - 98.4 98.3   Resp - - - - - -   SpO2 97 - 100 - - -   Weight 193 lb 192 lb 12.8 oz 191 lb 1.6 oz - 191 lb 191 lb 6.4 oz   Height 5' 5\" 5' 5.5\" 5' 5\" - 5' 5\" 5' 5\"   Body mass index 32.11 kg/m2 31.59 kg/m2 31.8 kg/m2 - 31.78 kg/m2 31.85 kg/m2   Pain Level - - - - - -   Some recent data might be hidden       Family History   Problem Relation Age of Onset    Hypertension Mother     No Known Problems Father     Thyroid Disease Sister     Cancer Maternal Great Grandmother         Unknown       Social History     Tobacco Use    Smoking status: Never Smoker    Smokeless tobacco: Never Used   Substance Use Topics    Alcohol use: Never      Current Outpatient Medications on File Prior to Visit   Medication Sig Dispense Refill    vitamin D (ERGOCALCIFEROL) 1.25 MG (71723 UT) CAPS capsule Take 1 capsule by mouth once a week 12 capsule 0    apixaban (ELIQUIS) 5 MG TABS tablet Take 1 tablet by mouth 2 times daily 60 tablet 1    Ascorbic Acid (VITAMIN C) 500 MG CAPS       Fluticasone Propionate [facility] ALLERGY RELIEF NA)       levonorgestrel (MIRENA, 52 MG,) IUD 52 mg       cetirizine (ZYRTEC) 10 MG tablet Take 10 mg by mouth daily      Probiotic Product (PROBIOTIC ADVANCED) CAPS Take 1 capsule by mouth daily      lisinopril (PRINIVIL;ZESTRIL) 5 MG tablet Take 0.5 tablets by mouth daily 45 tablet 1     No current facility-administered medications on file prior to visit. No Known Allergies    Health Maintenance   Topic Date Due    Hepatitis C screen  Never done    HIV screen  Never done    Diabetes screen  Never done    Colon cancer screen colonoscopy  Never done    DTaP/Tdap/Td vaccine (2 - Td or Tdap) 04/03/2019    Flu vaccine (1) Never done    Breast cancer screen  06/02/2022    Potassium monitoring  08/08/2022    Creatinine monitoring  08/08/2022    Lipid screen  04/13/2026    Cervical cancer screen  04/13/2026    COVID-19 Vaccine  Completed    Hepatitis A vaccine  Aged Out    Hepatitis B vaccine  Aged Out    Hib vaccine  Aged Out    Meningococcal (ACWY) vaccine  Aged Out    Pneumococcal 0-64 years Vaccine  Aged Out       Subjective:      Review of Systems   Constitutional: Negative. HENT: Negative. Eyes: Negative. Respiratory: Negative. Cardiovascular: Negative. Gastrointestinal: Negative. Genitourinary:        Abnormal pap   Musculoskeletal: Negative. Skin: Negative. Neurological: Negative. Hematological:        On blood thinner   Psychiatric/Behavioral: Negative. Objective:     Physical Exam  Vitals and nursing note reviewed. Exam conducted with a chaperone present. Constitutional:       Appearance: She is well-developed. HENT:      Head: Normocephalic. Right Ear: External ear normal.      Left Ear: External ear normal.   Eyes:      Pupils: Pupils are equal, round, and reactive to light. Cardiovascular:      Rate and Rhythm: Normal rate and regular rhythm. Heart sounds: Normal heart sounds.    Pulmonary:      Effort: Pulmonary effort is normal.      Breath sounds: Normal breath sounds. Chest:      Breasts:         Right: Normal.         Left: Skin change and tenderness present. No swelling, bleeding, inverted nipple or mass. Genitourinary:     Exam position: Lithotomy position. Labia:         Right: No rash, tenderness, lesion or injury. Left: No rash, tenderness, lesion or injury. Vagina: Normal.      Cervix: Normal.      Uterus: Normal.       Adnexa: Right adnexa normal and left adnexa normal.      Comments: String From the IUD is present coming from the cervix. A Pap smear was obtained from the cervix  Musculoskeletal:      Cervical back: Normal range of motion. Skin:     General: Skin is warm and dry. Neurological:      Mental Status: She is alert and oriented to person, place, and time. Psychiatric:         Behavior: Behavior normal.         Thought Content: Thought content normal.         Judgment: Judgment normal.       /70   Pulse 90   Temp 97.9 °F (36.6 °C)   Ht 5' 5\" (1.651 m)   Wt 193 lb (87.5 kg)   SpO2 97%   BMI 32.12 kg/m²     Assessment:       Diagnosis Orders   1. Multiple subsegmental pulmonary emboli without acute cor pulmonale (HCC)  CTA PULMONARY W CONTRAST   2. Ductal carcinoma in situ (DCIS) of left breast  CTA PULMONARY Polly Stark MD, Gynecology, [place]   3. Vitamin D deficiency  Vitamin D 25 Hydroxy   4. LGSIL of cervix of undetermined significance  Per Ornelas MD, Gynecology, [place]         Plan:   More than 50% of the time was spent counseling and coordinating care for a total time of 35min face to face. I really think she would probably benefit from a complete hysterectomy giving her history of the breast cancer in the low-grade dysplasia. I am going to set her up to see Dr. Nevarez Crew who does the robotic hysterectomies in get her opinion on the copper IUD versus the hysterectomy.   We will also do the CTA and discuss coming off the blood thinner after she has been on it from 6 months from her blood clot which will be after the first of the year. PDMP Monitoring:    Last PDMP Jesus Alberto as Reviewed [facility]):  Review User Review Instant Review Result            Urine Drug Screenings (1 yr)    No resulted procedures found. Medication Contract and Consent for Opioid Use Documents Filed      No documents found                 Patient given educational materials -see patient instructions. Discussed use, benefit, and side effects of prescribed medications. All patient questions answered. Pt voiced understanding. Reviewed health maintenance. Instructed to continue currentmedications, diet and exercise. Patient agreed with treatment plan. Follow up as directed. MEDICATIONS:  No orders of the defined types were placed in this encounter. ORDERS:  Orders Placed This Encounter   Procedures    CTA PULMONARY W CONTRAST    Vitamin D 25 Hydroxy   Neela Yost MD, Gynecology, [place]       Follow-up:  Return in about 6 months (around 4/28/2022). PATIENT INSTRUCTIONS:  There are no Patient Instructions on file for this visit. Electronically signed by ENRIQUE Holliday CNP on 10/28/2021 at 11:52 AM    EMR Dragon/transcription disclaimer:  Much of thisencounter note is electronic transcription/translation of spoken language to printed texts. The electronic translation of spoken language may be erroneous, or at times, nonsensical words or phrases may be inadvertentlytranscribed.   Although I have reviewed the note for such errors, some may still exist.

## 2021-11-02 ENCOUNTER — TELEPHONE (OUTPATIENT)
Dept: OBGYN CLINIC | Age: 51
End: 2021-11-02

## 2021-11-02 NOTE — TELEPHONE ENCOUNTER
Called and l/m for pt to call back regarding referral to Dr. Tres Hopper. Discussed pt's chart with Dr. Tres Hopper and she is recommending a hysterectomy. We can schedule that surgery on 11/18, 12/2, 12/9, or 12/16. The preop with Dr. Tres Hopper will be the week prior to surgery. Then pre admission will call pt to schedule labs and covid testing. If pt unsure about hysterectomy then can shilpa a consult with Dr. Tres Hopper to discuss in a couple of week.

## 2021-11-03 ENCOUNTER — HOSPITAL ENCOUNTER (OUTPATIENT)
Dept: CT IMAGING | Age: 51
Discharge: HOME | End: 2021-11-03
Payer: COMMERCIAL

## 2021-11-03 DIAGNOSIS — I26.94 MULTIPLE SUBSEGMENTAL PULMONARY EMBOLI WITHOUT ACUTE COR PULMONALE (HCC): ICD-10-CM

## 2021-11-03 DIAGNOSIS — D05.12 DUCTAL CARCINOMA IN SITU (DCIS) OF LEFT BREAST: ICD-10-CM

## 2021-11-03 PROCEDURE — 71275 CT ANGIOGRAPHY CHEST: CPT

## 2021-11-03 PROCEDURE — 6360000004 HC RX CONTRAST MEDICATION: Performed by: NURSE PRACTITIONER

## 2021-11-03 RX ADMIN — IOPAMIDOL 90 ML: 755 INJECTION, SOLUTION INTRAVENOUS at 10:49

## 2021-11-04 RX ORDER — APIXABAN 5 MG/1
TABLET, FILM COATED ORAL
Qty: 60 TABLET | Refills: 0 | Status: SHIPPED | OUTPATIENT
Start: 2021-11-04 | End: 2021-12-06

## 2021-11-19 ENCOUNTER — OFFICE VISIT (OUTPATIENT)
Dept: OBGYN CLINIC | Age: 51
End: 2021-11-19
Payer: COMMERCIAL

## 2021-11-19 VITALS
HEIGHT: 65 IN | WEIGHT: 190 LBS | DIASTOLIC BLOOD PRESSURE: 74 MMHG | BODY MASS INDEX: 31.65 KG/M2 | HEART RATE: 80 BPM | SYSTOLIC BLOOD PRESSURE: 130 MMHG

## 2021-11-19 DIAGNOSIS — Z97.5 IUD (INTRAUTERINE DEVICE) IN PLACE: ICD-10-CM

## 2021-11-19 DIAGNOSIS — D05.12 DUCTAL CARCINOMA IN SITU (DCIS) OF LEFT BREAST: ICD-10-CM

## 2021-11-19 DIAGNOSIS — R23.2 HOT FLASHES: ICD-10-CM

## 2021-11-19 DIAGNOSIS — D05.12 DUCTAL CARCINOMA IN SITU (DCIS) OF LEFT BREAST: Primary | ICD-10-CM

## 2021-11-19 LAB — FOLLICLE STIMULATING HORMONE: 77.2 MIU/ML

## 2021-11-19 PROCEDURE — 99203 OFFICE O/P NEW LOW 30 MIN: CPT | Performed by: OBSTETRICS & GYNECOLOGY

## 2021-11-19 ASSESSMENT — ENCOUNTER SYMPTOMS
RESPIRATORY NEGATIVE: 1
EYES NEGATIVE: 1
GASTROINTESTINAL NEGATIVE: 1

## 2021-11-19 NOTE — PROGRESS NOTES
I, Isabel Aleman RN, am scribing for and in the presence of Dr. Rosita Brink 2021/4:08 PM/sign         2021    Darshana Jones (:  1970) is a 54 y.o. female, here for a surgery consult. Mirena was placed in 2018. Pt was diagnosed in 2021 with breast cancer, DCIS. She had mastectomy and is shilpa'd for a reconstruction surgery in 2022. She did have blood clots after mastectomy and is on Eliquis. Pt is not wanting to get pregnant and would rather keep the IUD is she can. Patient Active Problem List   Diagnosis    Atypical ductal hyperplasia of breast    Lump or mass in breast    Ductal carcinoma in situ (DCIS) of left breast    Intraductal carcinoma in situ of left breast    S/P left mastectomy with expander reconstruction 2021    Bilateral pulmonary embolism ([facility] [address]       Review of Systems   Constitutional: Negative. HENT: Negative. Eyes: Negative. Respiratory: Negative. Cardiovascular: Negative. Gastrointestinal: Negative. Genitourinary: Negative for difficulty urinating, dyspareunia, dysuria, enuresis, frequency, hematuria, menstrual problem, pelvic pain, urgency and vaginal discharge. Musculoskeletal: Negative. Skin: Negative. Neurological: Negative. Psychiatric/Behavioral: Negative. Prior to Visit Medications    Medication Sig Taking?  Authorizing Provider   ELIQUIS 5 MG TABS tablet Take 1 tablet by mouth twice daily Yes ENRIQUE Rosenbaum   vitamin D (ERGOCALCIFEROL) 1.25 MG (74680 UT) CAPS capsule Take 1 capsule by mouth once a week Yes ENRIQUE Blanca - CNP   Ascorbic Acid (VITAMIN C) 500 MG CAPS  Yes Historical Provider, MD   Fluticasone Propionate (FLONASE ALLERGY RELIEF NA)  Yes Historical Provider, MD   levonorgestrel (MIRENA, 52 MG,) IUD 52 mg  Yes Historical Provider, MD   cetirizine (ZYRTEC) 10 MG tablet Take 10 mg by mouth daily Yes Historical Provider, MD   Probiotic Product (PROBIOTIC ADVANCED) CAPS Take 1 capsule by mouth daily Yes Historical Provider, MD   lisinopril (PRINIVIL;ZESTRIL) 5 MG tablet Take 0.5 tablets by mouth daily Yes Jarad Prasad, APRN - CNP        No Known Allergies    Past Medical History:   Diagnosis Date    Cancer ([facility] [address]     DCIS     delivery delivered     Ductal carcinoma in situ (DCIS) of left breast 2021    History of D&C     1993    Hypertension        Past Surgical History:   Procedure Laterality Date    APPENDECTOMY       SECTION      DILATION AND CURETTAGE OF UTERUS      X2    MASTECTOMY Left 2021    LEFT NIPPLE SPARING MASTECTOMY VIA WISE PATTERN, TISSUE EXPANDER RECONSTRUCTION, PEC BLOCK performed by Betsey Villalpando MD at [facility] [address] LEFT Left 2021     BREAST NEEDLE BIOPSY LEFT 2021 [facility] GENERAL SURGERY       Social History     Socioeconomic History    Marital status:      Spouse name: Not on file    Number of children: Not on file    Years of education: Not on file    Highest education level: Not on file   Occupational History    Not on file   Tobacco Use    Smoking status: Never Smoker    Smokeless tobacco: Never Used   Vaping Use    Vaping Use: Never used   Substance and Sexual Activity    Alcohol use: Never    Drug use: Never    Sexual activity: Yes     Partners: Male   Other Topics Concern    Not on file   Social History Narrative    Not on file     Social Determinants of Health     Financial Resource Strain:     Difficulty of Paying Living Expenses: Not on file   Food Insecurity:     Worried About [address] in the Last Year: Not on file    [address] in the Last Year: Not on file   Transportation Needs:     Lack of Transportation (Medical): Not on file    Lack of Transportation (Non-Medical):  Not on file   Physical Activity:     Days of Exercise per Week: Not on file    Minutes of Exercise per Session: Not on file   Stress:     Feeling of Stress : Not on file   Social Connections:     Frequency of Communication with Friends and Family: Not on file    Frequency of Social Gatherings with Friends and Family: Not on file    Attends Worship Services: Not on file    Active Member of Clubs or Organizations: Not on file    Attends Club or Organization Meetings: Not on file    Marital Status: Not on file   Intimate Partner Violence:     Fear of Current or Ex-Partner: Not on file    Emotionally Abused: Not on file    Physically Abused: Not on file    Sexually Abused: Not on file   Housing Stability:     Unable to Pay for Housing in the Last Year: Not on file    Number of [place] in the Last Year: Not on file    Unstable Housing in the Last Year: Not on file        Family History   Problem Relation Age of Onset    Hypertension Mother     No Known Problems Father     Thyroid Disease Sister     Cancer Maternal Great Grandmother         Unknown       ADVANCE DIRECTIVE: N, <no information>    Vitals:    11/19/21 1500   BP: 130/74   Site: Left Upper Arm   Position: Sitting   Cuff Size: Medium Adult   Pulse: 80   Weight: 190 lb (86.2 kg)   Height: 5' 5\" (1.651 m)     Estimated body mass index is 31.62 kg/m² as calculated from the following:    Height as of this encounter: 5' 5\" (1.651 m). Weight as of this encounter: 190 lb (86.2 kg). Physical Exam  Vitals and nursing note reviewed. Constitutional:       General: She is not in acute distress. Appearance: She is well-developed. She is not diaphoretic. HENT:      Head: Normocephalic and atraumatic. Eyes:      Conjunctiva/sclera: Conjunctivae normal.      Pupils: Pupils are equal, round, and reactive to light. Pulmonary:      Effort: Pulmonary effort is normal.   Abdominal:      Tenderness: There is no guarding. Musculoskeletal:         General: Normal range of motion. Cervical back: Normal range of motion.       Comments: Normal ROM in all 4 extremities; normal gait   Skin:     General: Skin is warm and dry. Neurological:      Mental Status: She is alert and oriented to person, place, and time. Motor: No abnormal muscle tone. Coordination: Coordination normal.   Psychiatric:         Behavior: Behavior normal.         No flowsheet data found. Lab Results   Component Value Date    CHOL 201 04/13/2021    TRIG 68 04/13/2021    HDL 55 04/13/2021    LDLCALC 132 04/13/2021    GLUCOSE 109 08/08/2021       The 10-year ASCVD risk score (Katie Sherman, et al., 2013) is: 1.2%    Values used to calculate the score:      Age: 54 years      Sex: Female      Is Non- : No      Diabetic: No      Tobacco smoker: No      Systolic Blood Pressure: 594 mmHg      Is BP treated: No      HDL Cholesterol: 55 mg/dL      Total Cholesterol: 201 mg/dL    Immunization History   Administered Date(s) Administered    COVID-19, Moderna, Primary or Immunocompromised, PF, 100mcg/0.5mL 01/28/2021, 03/01/2021       Health Maintenance   Topic Date Due    Hepatitis C screen  Never done    HIV screen  Never done    Diabetes screen  Never done    Colon cancer screen colonoscopy  Never done    DTaP/Tdap/Td vaccine (2 - Td or Tdap) 04/03/2019    Flu vaccine (1) Never done    Breast cancer screen  06/02/2022    Potassium monitoring  08/08/2022    Creatinine monitoring  08/08/2022    Lipid screen  04/13/2026    Cervical cancer screen  04/13/2026    COVID-19 Vaccine  Completed    Hepatitis A vaccine  Aged Out    Hepatitis B vaccine  Aged Out    Hib vaccine  Aged Out    Meningococcal (ACWY) vaccine  Aged Out    Pneumococcal 0-64 years Vaccine  Aged Out          ASSESSMENT/PLAN:  1. Ductal carcinoma in situ (DCIS) of left breast  -     Follicle Stimulating Hormone; Future  2. IUD (intrauterine device) in place  -     Follicle Stimulating Hormone; Future  3. Hot flashes  -     Follicle Stimulating Hormone;  Future    Discussed Mirena is a progesterone only IUD  Will check [address]  Depending on results will depend

## 2021-11-23 ENCOUNTER — TELEPHONE (OUTPATIENT)
Dept: OBGYN CLINIC | Age: 51
End: 2021-11-23

## 2021-11-23 DIAGNOSIS — Z97.5 IUD (INTRAUTERINE DEVICE) IN PLACE: ICD-10-CM

## 2021-11-23 DIAGNOSIS — Z87.42 HX OF OVARIAN CYST: Primary | ICD-10-CM

## 2021-11-23 DIAGNOSIS — D05.12 DUCTAL CARCINOMA IN SITU (DCIS) OF LEFT BREAST: ICD-10-CM

## 2021-11-23 NOTE — TELEPHONE ENCOUNTER
Pt called stating she was returning a call from Tawana Tsagn and wanted to speak with her regarding test results and her IUD order.

## 2021-11-23 NOTE — TELEPHONE ENCOUNTER
Informed pt of elevated FSH level and Dr. Rafat Spaulding recommendation of removing IUD. appt made for IUD removal. Pt is requesting TVUS due to hx of ovarian cysts.  TVUS ordered and pt will shilpa

## 2021-11-30 NOTE — PROGRESS NOTES
Progress Note      Pt Name: Sathish Gunn: 1970  MRN: [id number]    Date of evaluation: 12/01/2021  History Obtained From:  patient, electronic medical record    CHIEF COMPLAINT:    Chief Complaint   Patient presents with    Follow-up     Bilateral pulmonary embolism ([facility] [address]    Breast Cancer     HISTORY OF PRESENT ILLNESS:    Jenna Gonzalez is a 54 y.o.  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 Jeison Parmar was initiated on preventive endocrine therapy with tamoxifen 20 mg daily on 6/21/2021 followed by left nipple sparing mastectomy with immediate tissue expander reconstruction on 7/27/2021. She presented to [facility] ED on 8/6/2021 right sharp pleuritic chest discomfort and shortness of breath. CTA of the chest revealed bilateral pulmonary embolus with no evidence of right heart strain and she was initiated on heparin drip and then transitioned to Eliquis on 8/8/2021. Jeison Parmar returns today in scheduled follow-up for evaluation, lab monitoring and further treatment recommendations. She presents today indicating that overall she is doing well, denies chest pain or shortness of air and reports compliant with Eliquis 5 mg p.o. twice daily. She denies any excessive bruising or bleeding to include melena, epistaxis, hematuria or hematochezia. Jeison Parmar is having her IUD removed in January 2022 and then anticipating left breast abdominal flap reconstructive surgery on 2/22/2022 with Dr. Teresa Betancourt. At this time she needs to continue with therapeutic anticoagulation with Eliquis 5 mg p.o. twice daily and will need to be placed on Lovenox 1 mg/kg every 12 hours in place of Eliquis for surgical intervention. She also reports planning on hysterectomy in fall of 2022.      ONCOLOGIC HISTORY:  Ductal Carcinoma in Situ of the left breast, ER 94%, stage 0, pTis, pNx, pMx  Roc Aguilar was seen in initial medical oncology and hematology consultation on 8/7/2021 regarding bilateral pulmonary emboli in the setting of a recent left mastectomy for DCIS.   Roc Aguilar [facility]  A Pap smear was completed and she was incidentally noted to have an elevated blood pressure and placed on lisinopril with recommendations for close monitoring of BP.  Orders were also given for routine screening arrangements were made for bilateral mammogram.     Bilateral mammogram at [facility] on 4/19/2021  · Left breast with multiple groups of calcifications, which have increased in size and number compared to 07/14/2014. · No mammographic evidence of malignancy in the right breast.      Left mammogram with CAD at [facility] on 4/23/2021  · Multiple similar appearing groups of indeterminate calcifications in   the left breast. Recommend stereotactic left breast biopsy.      Ultrasound-guided left breast biopsy at [facility] on 4/30/2021   · Pathology indicating atypical ductal hyperplasia.    · Tissue was sent to Dr. Mercedes Cheema for consultation and she reported \"the changes are concerning for low-grade ductal carcinoma in situ, but are insufficiently developed to establish the diagnosis and the sample.  Final classification should be made on excisional biopsy specimen\".     MRI of bilateral breast on 5/26/2021 at [facility]  · Large 8.5 x 3.3 x 5.4 cm area of regional non-mass enhancement in the LEFT upper outer breast, with differential diagnosis including DCIS. · No suspicious focal mass in either breast. No focal mass adjacentto the LEFT breast biopsy clip marker.   · No axillary or internal mammary lymphadenopathy      Roc Aguilar was seen in initial consultation by KATHLEEN Flores on 5/21/2021 followed by an appointment with Dr. Noni Romero on 5/27/2021.     Left breast biopsy on 6/2/2021 by  Sae  · Micropapillary ductal carcinoma in situ.   · Negative for invasion  · ER is positive at 94%.       Seen by Dr. Ralph Cabrera on 6/21/2021 and initiated on preventive tamoxifen 20 mg PO.       Left nipple sparing mastectomy with immediate tissue expander reconstruction on 7/27/2021 by Dr. Ralph Cabrera  Pathology:   1.  Extensive low-grade ductal carcinoma in situ (micropapillary and cribriform types). 2.  In situ carcinoma demonstrates extensive intraluminal mucin production. 3.  In situ carcinoma measures at least 8.0 cm in greatest dimension. 4.  In situ carcinoma extends to within 0.1 cm of the deep surgical excision margin and 0.4 cm of the anterior margin. 5.  Negative for invasive carcinoma. AJCC STAGE: pTis, pNx, pMx      Dr. Lenora Teixeira discussed the following with Dr. Celia Beard who is in agreement with holding tamoxifen until after completion of left breast reconstruction. At that time if declared postmenopausal can consider initiating preventative Femara or will consider resuming tamoxifen. Tamoxifen was started in the preventative setting. This of course is to prevent new disease developments over the future years in the right breast.  Although Tony Montaño will be on anticoagulation, she does have future surgeries scheduled for reconstruction. Dr. Lenora Teixeira prefered that she be off of tamoxifen which can be a prothrombotic agent until after all of that has been accomplished.     Recommend stopping Tamoxifen due to thrombotic risk factor on 8/7/2021  The issue of preventive therapy will be addressed after completion of left breast reconstruction.    At that time, if declared postmenopausal will consider initiating preventative Femara.   Reports irregular menstrual cycles and the last menstrual cycle was in March 2021.        Treatment summary:  · Initiated on preventative tamoxifen 20 mg on 6/21/2021  · Left nipple sparing mastectomy with immediate tissue expander reconstruction on 7/27/2021 by  Sae  · Preventative tamoxifen held on 8/7/2021       HEMATOLOGIC HISTORY:  Provoked bilateral pulmonary emboli, 8/6/2021 (suspect secondary to tamoxifen and surgical intervention)  John Acuna was admitted to [facility] to the ED on 8/6/2021 with bilateral pulmonary emboli.   John Acuna presented to [address] [place] [address] ED with right-sided sharp pleuritic chest discomfort over the previous 12-24 hours. The pain was a stabbing knifelike sensation    CTA of the chest with contrast on 8/6/2021  · Small to moderate amount of pulmonary embolus greatest in the right lower lobe pulmonary artery branches. There is bilateral pulmonary embolus. · No definite CT evidence of right heart strain.   · Mild cardiomegaly. · Focal parenchymal consolidation in the right lower lobe with surrounding groundglass opacity in the same distribution as thegreatest amount of pulmonary embolus. This may represent a pulmonary infarct.     PT 14.0/INR 1.06 and aPTT 21.3 on 8/6/2021  COVID-19, rapid negative on 8/6/2021     John Acuna was placed on on heparin drip and admitted for further evaluation and recommendations.     Noninvasive venous study of bilateral lower extremities  on 8/7/2021 with no evidence of DVT, SVT or reflux.     Serology studies on 8/7/2021  · Anticardiolipin IgA 1.5, IgG 1.3, IgM 3.8 -negative  · Beta 2 Glycoprotein IgG 0, IgM 2 (0-20)  · Cardiolipin antibodies IgG <10, IgM <10 (0-14)  · Protein C Ag >95 (%)  · Protein S Ag 107 (%)  · Antithrombin activity 92 ()  · Antithrombin Ag 76 ()  · Lupus anticoagulant-  not detected  · Factor V Leiden - negative  · Prothrombin gene mutation - negative  · MTHFR: compound heterozygous Mutation C677T and Mutation [id number] (may be associated with a mild decrease in MTHFR enzyme activity although clinically insignificant and most likely not a factor recent finding of pulmonary emboli)   · Homocysteine 5 (0-15)    Heparin discontinued and initiated Eliquis 10 mg p.o. twice daily x7 days followed by Eliquis 5 mg p.o. twice daily on 2021.     JAK2 V617F flex to CA LR/exon 12-15/MPL was negative on 2021 CTA Chest- No evidence of embolic disease. There is no residual from a emboli  described 2021.    2021 FSH- 77.2 (postmenopause)    Past Medical History:    Past Medical History:   Diagnosis Date    Cancer ([facility] [address]     DCIS     delivery delivered     Ductal carcinoma in situ (DCIS) of left breast 2021    History of D&C     1993    Hypertension        Past Surgical History:    Past Surgical History:   Procedure Laterality Date    APPENDECTOMY       SECTION      DILATION AND CURETTAGE OF UTERUS      X2    MASTECTOMY Left 2021    LEFT NIPPLE SPARING MASTECTOMY VIA WISE PATTERN, TISSUE EXPANDER RECONSTRUCTION, PEC BLOCK [place]ant [place] [facility] [place] Left 2021     BREAST NEEDLE BIOPSY LEFT 2021 [facility] GENERAL SURGERY       Current Medications:    Current Outpatient Medications   Medication Sig Dispense Refill    Ascorbic Acid (VITAMIN C) 500 MG CAPS       Fluticasone Propionate (FLONASE ALLERGY RELIEF NA)       levonorgestrel (MIRENA, 52 MG,) IUD 52 mg       cetirizine (ZYRTEC) 10 MG tablet Take 10 mg by mouth daily      Probiotic Product (PROBIOTIC ADVANCED) CAPS Take 1 capsule by mouth daily      vitamin D (ERGOCALCIFEROL) 1.25 MG (48144 UT) CAPS capsule Take 1 capsule by mouth once a week 12 capsule 0    ELIQUIS 5 MG TABS tablet Take 1 tablet by mouth twice daily 60 tablet 0    lisinopril (PRINIVIL;ZESTRIL) 5 MG tablet Take 1/2 (one-half) tablet by mouth once daily 45 tablet 3     No current facility-administered medications for this visit.         Allergies: No Known Allergies    Social History:    Social History     Tobacco Use    Smoking status: Never Smoker    Smokeless tobacco: Never Used   Vaping Use    Vaping Use: Never used   Substance Use Topics    Alcohol use: Never    Drug use: Never       Family History:   Family History   Problem Relation Age of Onset    Hypertension Mother     No Known Problems Father     Thyroid Disease Sister     Cancer Maternal Great Grandmother         Unknown       Vitals:  Vitals:    12/01/21 1335   BP: 124/84   Pulse: 79   SpO2: 98%   Weight: 189 lb 11.2 oz (86 kg)   Height: 5' 5\" (1.651 m)        Subjective   REVIEW OF SYSTEMS:   Review of Systems   Constitutional: Negative. Negative for chills, diaphoresis and fever. HENT: Negative. Negative for congestion, ear pain, hearing loss, nosebleeds, sore throat and tinnitus. Eyes: Negative. Negative for pain, discharge and redness. Respiratory: Negative. Negative for cough, shortness of breath and wheezing. Cardiovascular: Negative. Negative for chest pain, palpitations and leg swelling. Gastrointestinal: Negative. Negative for abdominal pain, blood in stool, constipation, diarrhea, nausea and vomiting. Endocrine: Negative for polydipsia. Genitourinary: Negative for dysuria, flank pain, frequency, hematuria and urgency. Musculoskeletal: Negative. Negative for back pain, myalgias and neck pain. Skin: Negative. Negative for rash. Neurological: Negative. Negative for dizziness, tremors, seizures, weakness and headaches. Hematological: Does not bruise/bleed easily. Psychiatric/Behavioral: Negative. The patient is not nervous/anxious. Objective   PHYSICAL EXAM:  Physical Exam  Vitals reviewed. Constitutional:       General: She is not in acute distress. Appearance: She is well-developed. She is not diaphoretic. HENT:      Head: Normocephalic and atraumatic. Mouth/Throat:      Pharynx: Uvula midline. Tonsils: No tonsillar exudate. Eyes:      General: Lids are normal. No scleral icterus. Right eye: No discharge. Left eye: No discharge.       Conjunctiva/sclera: Conjunctivae normal.      Pupils: Pupils are equal, round, and reactive to light. Neck:      Thyroid: No thyroid mass or thyromegaly. Vascular: No JVD. Trachea: Trachea normal. No tracheal deviation. Cardiovascular:      Rate and Rhythm: Normal rate and regular rhythm. Heart sounds: Normal heart sounds. No murmur heard. No friction rub. No gallop. Pulmonary:      Effort: Pulmonary effort is normal. No respiratory distress. Breath sounds: Normal breath sounds. No wheezing or rales. Chest:      Chest wall: No tenderness. Abdominal:      General: Bowel sounds are normal. There is no distension. Palpations: Abdomen is soft. There is no mass. Tenderness: There is no abdominal tenderness. There is no guarding or rebound. Hernia: No hernia is present. Musculoskeletal:         General: No tenderness or deformity. Cervical back: Normal range of motion and neck supple. Comments: Range of motion within normal limits x4 extremities   Skin:     General: Skin is warm. Coloration: Skin is not pale. Findings: No erythema or rash. Neurological:      Mental Status: She is alert and oriented to person, place, and time. Cranial Nerves: No cranial nerve deficit. Coordination: Coordination normal.   Psychiatric:         Behavior: Behavior normal.         Thought Content: Thought content normal.       Labs reviewed today:  Lab Results   Component Value Date    WBC 8.82 12/01/2021    HGB 14.1 12/01/2021    HCT 42.6 12/01/2021    MCV 93.8 12/01/2021     12/01/2021     Lab Results   Component Value Date    NEUTROABS 5.63 12/01/2021         ASSESSMENT/PLAN:      1. 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 Initiated on preventive tamoxifen 20 mg p.o. daily by Dr. Terell Gold on 6/21/2021 and placed on hold on 8/7/2021 due to provoked bilateral pulmonary emboli.   It is preferred that she be off of tamoxifen which can be a prothrombotic agent until after all of that has been accomplished. Plan to readdress preventive therapy after completion of left breast reconstruction. At that time, if declared postmenopausal will consider initiating preventative Femara 2.5 mg daily. Reports irregular menstrual cycles and the last menstrual cycle was in March 2021. Gala Klinefelter is having her IUD removed in January 2022 and then anticipating left breast abdominal flap reconstructive surgery on 2/22/2022 with Dr. Cruz Livingston. At this time she needs to continue with therapeutic anticoagulation with Eliquis 5 mg p.o. twice daily and will need to be placed on Lovenox 1 mg/kg every 12 hours in place of Eliquis for surgical intervention. She also reports planning on hysterectomy in fall of 2022.     -Continue to HOLD tamoxifen    2. Provoked bilateral pulmonary embolism, 8/6/2021. JAK2 V617F flex to CA LR/exon 12-15/MPL was negative on 9/1/2021. Currently anticoagulated with Eliquis 5 mg p.o. twice daily. Denies any excessive bruising or bleeding to include melena, epistaxis, hematuria or hematochezia. -Continue Eliquis 5 mg p.o. twice daily  - Will recommend the use of therapeutic Lovenox 1 mg/kg every 12 hours versus Eliquis 4 days prior to surgery and can be held per surgeons discretion, usually 24 hours prior to surgery. I discussed all of the above findings included in the assessment and plan with the patient and the patient is in agreement to move forward with current recommendations/treatment. I have addressed all of their questions and concerns that were verbalized. FOLLOW UP:  Follow-up appointment given for 16 weeks or sooner if needed  Continue to follow with other medical providers as recommended. Labs at next visit: CBC and CMP    EMR Dragon/Transcription disclaimer:   Much of this encounter note is an electronic transcription/translation of spoken language to printed text.  The electronic translation of spoken language may permit erroneous, or at times, nonsensical words or phrases to be inadvertently transcribed; although attempts have made to review the note for such errors, some may still exist.  Please excuse any unrecognized transcription errors and contact us if the air is unintelligible or needs documented correction. Also, portions of this note have been copied forward, however, changed to reflect the most current clinical status of this patient. ILisa, am pre-charting as a registered nurse for ENRIQUE Roque.   Electronically signed by ENRIQUE Osborne on 12/14/2021 at 6:22 PM

## 2021-12-01 ENCOUNTER — HOSPITAL ENCOUNTER (OUTPATIENT)
Dept: ULTRASOUND IMAGING | Age: 51
Discharge: HOME | End: 2021-12-01
Payer: COMMERCIAL

## 2021-12-01 ENCOUNTER — HOSPITAL ENCOUNTER (OUTPATIENT)
Dept: INFUSION THERAPY | Age: 51
Discharge: HOME | End: 2021-12-01
Payer: COMMERCIAL

## 2021-12-01 ENCOUNTER — OFFICE VISIT (OUTPATIENT)
Dept: HEMATOLOGY | Age: 51
End: 2021-12-01
Payer: COMMERCIAL

## 2021-12-01 VITALS
DIASTOLIC BLOOD PRESSURE: 84 MMHG | WEIGHT: 189.7 LBS | OXYGEN SATURATION: 98 % | HEART RATE: 79 BPM | SYSTOLIC BLOOD PRESSURE: 124 MMHG | BODY MASS INDEX: 31.61 KG/M2 | HEIGHT: 65 IN

## 2021-12-01 DIAGNOSIS — Z97.5 IUD (INTRAUTERINE DEVICE) IN PLACE: ICD-10-CM

## 2021-12-01 DIAGNOSIS — D05.12 INTRADUCTAL CARCINOMA IN SITU OF LEFT BREAST: ICD-10-CM

## 2021-12-01 DIAGNOSIS — I26.99 BILATERAL PULMONARY EMBOLISM (HCC): ICD-10-CM

## 2021-12-01 DIAGNOSIS — D05.12 DUCTAL CARCINOMA IN SITU (DCIS) OF LEFT BREAST: ICD-10-CM

## 2021-12-01 DIAGNOSIS — D05.12 DUCTAL CARCINOMA IN SITU (DCIS) OF LEFT BREAST: Primary | ICD-10-CM

## 2021-12-01 DIAGNOSIS — Z87.42 HX OF OVARIAN CYST: ICD-10-CM

## 2021-12-01 LAB
BASOPHILS ABSOLUTE: 0.02 K/UL (ref 0.01–0.08)
BASOPHILS RELATIVE PERCENT: 0.2 % (ref 0.1–1.2)
EOSINOPHILS ABSOLUTE: 0.35 K/UL (ref 0.04–0.54)
EOSINOPHILS RELATIVE PERCENT: 4 % (ref 0.7–7)
HCT VFR BLD CALC: 42.6 % (ref 34.1–44.9)
HEMOGLOBIN: 14.1 G/DL (ref 11.2–15.7)
LYMPHOCYTES ABSOLUTE: 2.07 K/UL (ref 1.18–3.74)
LYMPHOCYTES RELATIVE PERCENT: 23.5 % (ref 19.3–53.1)
MCH RBC QN AUTO: 31.1 PG (ref 25.6–32.2)
MCHC RBC AUTO-ENTMCNC: 33.1 G/DL (ref 32.3–35.5)
MCV RBC AUTO: 93.8 FL (ref 79.4–94.8)
MONOCYTES ABSOLUTE: 0.75 K/UL (ref 0.24–0.82)
MONOCYTES RELATIVE PERCENT: 8.5 % (ref 4.7–12.5)
NEUTROPHILS ABSOLUTE: 5.63 K/UL (ref 1.56–6.13)
NEUTROPHILS RELATIVE PERCENT: 63.8 % (ref 34–71.1)
PDW BLD-RTO: 13.6 % (ref 11.7–14.4)
PLATELET # BLD: 224 K/UL (ref 182–369)
PMV BLD AUTO: 10.6 FL (ref 7.4–10.4)
RBC # BLD: 4.54 M/UL (ref 3.93–5.22)
WBC # BLD: 8.82 K/UL (ref 3.98–10.04)

## 2021-12-01 PROCEDURE — 76830 TRANSVAGINAL US NON-OB: CPT

## 2021-12-01 PROCEDURE — 99211 OFF/OP EST MAY X REQ PHY/QHP: CPT

## 2021-12-01 PROCEDURE — 85025 COMPLETE CBC W/AUTO DIFF WBC: CPT

## 2021-12-01 PROCEDURE — 99214 OFFICE O/P EST MOD 30 MIN: CPT | Performed by: NURSE PRACTITIONER

## 2021-12-01 NOTE — PATIENT INSTRUCTIONS
Patient Education        letrozole  Pronunciation:  LET liliana zol  Brand:  Femara  What is the most important information I should know about letrozole? You should not use letrozole if you are pregnant. What is letrozole? Letrozole lowers estrogen levels in postmenopausal women, which may slow the growth of certain types of breast tumors that need estrogen to grow in the body. Letrozole is used to treat breast cancer in postmenopausal women. It is often given to women who have been taking tamoxifen (Nolvadex, Soltamox) for 5 years. Letrozole may also be used for purposes not listed in this medication guide. What should I discuss with my healthcare provider before taking letrozole? You should not use letrozole if you are allergic to it. This medicine is for use only in women who can no longer get pregnant. Letrozole can harm an unborn baby. Do not use if you are pregnant. Use effective birth control if you are not past menopause. Keep using birth control for at least 3 weeks after your last dose of letrozole. Tell your doctor if you think you may be pregnant. Tell your doctor if you have ever had:  · liver disease (especially cirrhosis);  · osteoporosis, osteopenia (low bone mineral density);  · high cholesterol; or  · if you also take tamoxifen. You should not breastfeed while you are using letrozole and for at least 3 weeks after your last dose. How should I take letrozole? Follow all directions on your prescription label and read all medication guides or instruction sheets. Use the medicine exactly as directed. You may take letrozole with or without food. You will need frequent medical tests, and your bone mineral density may also need to be checked. Store at room temperature away from moisture and heat. What happens if I miss a dose? Take the medicine as soon as you can, but skip the missed dose if it is almost time for your next dose. Do not take two doses at one time.   What happens if I overdose? Seek emergency medical attention or call the Poison Help line at 1-236.698.4327. What should I avoid while taking letrozole? Avoid driving or hazardous activity until you know how this medicine will affect you. Your reactions could be impaired. What are the possible side effects of letrozole? Get emergency medical help if you have signs of an allergic reaction: hives; difficult breathing; swelling of your face, lips, tongue, or throat. Common side effects may include:  · hot flashes, warmth or redness in your face or chest;  · headache, dizziness, weakness;  · bone pain, muscle or joint pain;  · swelling, weight gain;  · increased sweating; or  · increased cholesterol in your blood. This is not a complete list of side effects and others may occur. Call your doctor for medical advice about side effects. You may report side effects to FDA at 8-080-HII-4488. What other drugs will affect letrozole? Other drugs may affect letrozole, including prescription and over-the-counter medicines, vitamins, and herbal products. Tell your doctor about all your current medicines and any medicine you start or stop using. Where can I get more information? Your pharmacist can provide more information about letrozole. Remember, keep this and all other medicines out of the reach of children, never share your medicines with others, and use this medication only for the indication prescribed. Every effort has been made to ensure that the information provided by [address] is accurate, up-to-date, and complete, but no guarantee is made to that effect. Drug information contained herein may be time sensitive. [facility] information has been compiled for use by healthcare practitioners and consumers in the [place] and therefore [facility] does not warrant that uses outside of the [place] are appropriate, unless specifically indicated otherwise.  [facility] drug information does not endorse drugs, diagnose patients or recommend therapy. [facility]'s drug information is an informational resource designed to assist licensed healthcare practitioners in caring for their patients and/or to serve consumers viewing this service as a supplement to, and not a substitute for, the expertise, skill, knowledge and judgment of healthcare practitioners. The absence of a warning for a given drug or drug combination in no way should be construed to indicate that the drug or drug combination is safe, effective or appropriate for any given patient. [facility] does not assume any responsibility for any aspect of healthcare administered with the aid of information [facility] provides. The information contained herein is not intended to cover all possible uses, directions, precautions, warnings, drug interactions, allergic reactions, or adverse effects. If you have questions about the drugs you are taking, check with your doctor, nurse or pharmacist.  Copyright 0132-7627 [address]. Version: 8.02. Revision date: 5/14/2020. Care instructions adapted under license by [facility] ([facility]). If you have questions about a medical condition or this instruction, always ask your healthcare professional. [place] [address] any warranty or liability for your use of this information.      9

## 2021-12-06 RX ORDER — APIXABAN 5 MG/1
TABLET, FILM COATED ORAL
Qty: 60 TABLET | Refills: 0 | Status: SHIPPED | OUTPATIENT
Start: 2021-12-06 | End: 2022-01-10

## 2021-12-10 RX ORDER — ERGOCALCIFEROL 1.25 MG/1
50000 CAPSULE ORAL WEEKLY
Qty: 12 CAPSULE | Refills: 0 | Status: SHIPPED | OUTPATIENT
Start: 2021-12-10 | End: 2022-03-17

## 2021-12-14 ASSESSMENT — ENCOUNTER SYMPTOMS
VOMITING: 0
CONSTIPATION: 0
RESPIRATORY NEGATIVE: 1
WHEEZING: 0
EYE REDNESS: 0
SORE THROAT: 0
BLOOD IN STOOL: 0
ABDOMINAL PAIN: 0
EYE PAIN: 0
COUGH: 0
SHORTNESS OF BREATH: 0
BACK PAIN: 0
EYE DISCHARGE: 0
DIARRHEA: 0
NAUSEA: 0
GASTROINTESTINAL NEGATIVE: 1
EYES NEGATIVE: 1

## 2022-01-12 ENCOUNTER — OFFICE VISIT (OUTPATIENT)
Dept: OBGYN CLINIC | Age: 52
End: 2022-01-12
Payer: COMMERCIAL

## 2022-01-12 VITALS
WEIGHT: 188 LBS | BODY MASS INDEX: 31.32 KG/M2 | DIASTOLIC BLOOD PRESSURE: 88 MMHG | SYSTOLIC BLOOD PRESSURE: 118 MMHG | HEIGHT: 65 IN

## 2022-01-12 DIAGNOSIS — Z30.432 ENCOUNTER FOR IUD REMOVAL: Primary | ICD-10-CM

## 2022-01-12 DIAGNOSIS — D05.12 DUCTAL CARCINOMA IN SITU (DCIS) OF LEFT BREAST: ICD-10-CM

## 2022-01-12 PROCEDURE — 58301 REMOVE INTRAUTERINE DEVICE: CPT | Performed by: OBSTETRICS & GYNECOLOGY

## 2022-01-12 ASSESSMENT — ENCOUNTER SYMPTOMS
RESPIRATORY NEGATIVE: 1
EYES NEGATIVE: 1
GASTROINTESTINAL NEGATIVE: 1

## 2022-01-12 NOTE — Clinical Note
"Encounter Date: 8/27/2018    SORT:  46 y.o. male presenting to ED for itchy rash to L middle finger x1 month. Works as  with thick gloves. Denies pain and fever. Limited triage exam:  Non-toxic. If orders were placed, they are pending.     Hiren Hernandez PA-C  08/27/2018  11:41 AM   SCRIBE #1 NOTE: I, Berenice Parada, am scribing for, and in the presence of,  Manda Conteh NP. I have scribed the following portions of the note - Other sections scribed: HPI, ROS.       History     Chief Complaint   Patient presents with    Rash     pt here for rash to left middle finger x1mo. pt reports going to drug store to "have it looked at', has been using anti-fungal cream with no improvement. reports itching.      CC: Rash    HPI: This is a 46 y.o. M who has no pertinent past medical history who presents to the ED c/o 1 month history of acute pruritic rash to the third digit of the left hand. Pt states that he works as a , which he wear thick gloves. He has been applying antifungal cream to the affected area without improvement. Pt has no other complaints. He denies fever, chills, abdominal pain, nausea, vomiting or diarrhea.      The history is provided by the patient. No  was used.     Review of patient's allergies indicates:  No Known Allergies  No past medical history on file.  History reviewed. No pertinent surgical history.  History reviewed. No pertinent family history.  Social History     Tobacco Use    Smoking status: Current Some Day Smoker     Packs/day: 0.50     Types: Cigarettes   Substance Use Topics    Alcohol use: Yes    Drug use: No     Review of Systems   Constitutional: Negative for chills and fever.   HENT: Negative for ear pain and sore throat.    Eyes: Negative for pain.   Respiratory: Negative for cough and shortness of breath.    Cardiovascular: Negative for chest pain.   Gastrointestinal: Negative for abdominal pain, diarrhea, nausea and vomiting.   Genitourinary: " Mariposa Parker, we have Hitesh Mems scheduled for a TLH/BSO on 3/17. She is currently on Eliquis and we are needing instructions prior to her surgery please. She said that she was going to bridge with Lovenox for her breast surgery next month. Thanks!  TORI Hall Negative for dysuria.   Musculoskeletal: Negative for back pain.   Skin: Positive for rash (to the third digit of the left hand).   Neurological: Negative for headaches.       Physical Exam     Initial Vitals [08/27/18 1140]   BP Pulse Resp Temp SpO2   133/72 78 18 98.6 °F (37 °C) 98 %      MAP       --         Physical Exam    Vitals reviewed.  Constitutional: He appears well-developed and well-nourished. He is not diaphoretic.  Non-toxic appearance. He does not have a sickly appearance. He does not appear ill. No distress.   HENT:   Head: Normocephalic and atraumatic.   Right Ear: External ear normal.   Left Ear: External ear normal.   Mouth/Throat: Oropharynx is clear and moist.   Eyes: EOM are normal. Pupils are equal, round, and reactive to light.   Neck: Normal range of motion. Neck supple.   Cardiovascular: Normal rate, regular rhythm, normal heart sounds and intact distal pulses. Exam reveals no gallop and no friction rub.    No murmur heard.  Pulmonary/Chest: Breath sounds normal. No respiratory distress.   Abdominal: Soft. There is no tenderness.   Musculoskeletal: Normal range of motion.   Neurological: He is alert and oriented to person, place, and time. GCS eye subscore is 4. GCS verbal subscore is 5. GCS motor subscore is 6.   Skin: Skin is warm, dry and intact. Rash noted. No erythema.   Pruritic rash to 3rd digit of the left hand   Psychiatric: He has a normal mood and affect. Thought content normal.             ED Course   Procedures  Labs Reviewed - No data to display       Imaging Results    None          Medical Decision Making:   ED Management:  This is an evaluation of a 46 y.o. male that presents to the Emergency Department for rash.  The patient is a non-toxic, afebrile, and well appearing male. On physical exam, there is there is a pruritic scaling plaque vesicles the lateral aspect of the 3rd digit of the left hand. There are no oral mucosa lesions, lesions in the webspace's of the fingers  or toes, lesions on the palms or soles, desquamation, or sloughing of the skin. No herald patch or satellite lesions. It does not appear fungal.    Vital Signs are Reassuring.     Patient works as a  and wears gloves in the heat.  Lesion is localized to his finger.  No oral or genital lesions.  It is pruritic.  It appears to be a dermatitis.  Does not appear to be herpetic.  We will treat with topical steroid cream and have him follow up with Dermatology.    Given the above findings, my overall impression is dyshidrotic eczema. Given the above findings, I do not think the patients rash is a result of Hand, Foot, and Mouth, Scabies, Shingles, Chicken Pox, meningococcemia, TENs, or SJS.     ED Meds:  Kenalog cream. DC Meds:  Kenalog cream. Additional recommendations:  Moisturizer, signs of infection.  The diagnosis, treatment plan, instructions for follow-up and reevaluation with PCP and Dermatology as well as ED return precautions have been discussed and an understanding has been verbalized. All questions or concerns from the patient have been addressed.     This case was discussed with and the patient has been examined by Dr. Rodriguez who is in agreement with my assessment and plan.            Scribe Attestation:   Scribe #1: I performed the above scribed service and the documentation accurately describes the services I performed. I attest to the accuracy of the note.    Attending Attestation:           Physician Attestation for Scribe:  Physician Attestation Statement for Scribe #1: I, Manda Conteh NP, reviewed documentation, as scribed by Berenice Parada in my presence, and it is both accurate and complete.                    Clinical Impression:   The encounter diagnosis was Dyshidrotic eczema.      Disposition:   Disposition: Discharged  Condition: Stable                        Manda Conteh NP  08/27/18 1304

## 2022-01-12 NOTE — PATIENT INSTRUCTIONS
Patient Education        IUD Removal: Care Instructions  Your Care Instructions     The intrauterine device (IUD) is a method of birth control. It is a small, plastic, T-shaped device that contains copper or hormones. It is placed in your uterus. You may have had your IUD removed because you want to become pregnant. Or maybe it caused pain, bleeding, or an infection. You may have chosen another method of birth control. If you don't want to get pregnant, make sure to use another form of birth control now that your IUD is not in place. Talk to your doctor about other forms of birth control. Follow-up care is a key part of your treatment and safety. Be sure to make and go to all appointments, and call your doctor if you are having problems. It's also a good idea to know your test results and keep a list of the medicines you take. How can you care for yourself at home? · IUD removal does not usually cause any pain or problems if the IUD is removed because you want to become pregnant or because of bleeding. · Once the IUD is taken out, you can become pregnant. If you want to become pregnant, you can start trying to have a baby as soon as you like. · If your doctor prescribed antibiotics because of an infection, take them as directed. Do not stop taking them just because you feel better. You need to take the full course of antibiotics. When should you call for help? Call your doctor now or seek immediate medical care if:    · You have pain in your belly or pelvis.     · You have severe vaginal bleeding. This means that you are soaking through your usual pads or tampons every hour for 2 or more hours.     · You have a fever.     · You have a vaginal discharge that smells bad. Watch closely for changes in your health, and be sure to contact your doctor if you have any problems. Where can you learn more? Go to [email or web address] and sign in to your [facility] account.  Enter T503 in the Search Health Information box to learn more about \"IUD Removal: Care Instructions. \"     If you do not have an account, please click on the \"Sign Up Now\" link. Current as of: June 16, 2021               Content Version: 13.1  © 9289-9942 [employer]. Care instructions adapted under license by [facility] ([facility]). If you have questions about a medical condition or this instruction, always ask your healthcare professional. [address] any warranty or liability for your use of this information.

## 2022-01-24 NOTE — PROGRESS NOTES
HISTORY OF PRESENT ILLNESS:    Ms. Rodney García presents today for follow-up. She is status post left mastectomy with tissue expander reconstruction on 7/27/2021. She was anticipating Shana flap reconstruction however she had bilateral pulmonary emboli and has been on anticoagulation the last several months. She is anticipating surgery in [place] on February 22. She is recently status post ultrasound guided breast biopsy  on the left which revealed a tiny micropapillary ductal   carcinoma in situ. ER is positive at 94%. MRI-5/26/2021  FINDINGS:  There is a large 8.5 x 3.3 x 5.4 cm area of non-mass enhancement in   the LEFT upper outer breast, located along the lateral and inferior   aspect of the biopsy clip marker. No focal suspicious solid mass. No   mass adjacent to the biopsy clip marker. No abnormal LEFT breast skin   thickening or nipple enhancement. No abnormal enhancement or suspicious mass in the RIGHT breast. No   abnormal RIGHT breast skin thickening or nipple enhancement. No enlarged axillary or internal mammary lymph nodes. Limited   evaluation of the anterior chest and upper abdomen is unremarkable. No   suspicious osseous lesions.       Impression   Impression:   1.  Large 8.5 x 3.3 x 5.4 cm area of regional non-mass enhancement in   the LEFT upper outer breast, with differential diagnosis including   DCIS. 2.  No suspicious focal mass in either breast. No focal mass adjacent   to the LEFT breast biopsy clip marker. 3.  No axillary or internal mammary lymphadenopathy. BI-RADS CATEGORY 6: KNOWN BIOPSY WITH PROVEN MALIGNANCY. Signed by Dr Neena Sotelo on 5/26/2021 8:20 AM     Review of her MRI and her mammogram demonstrated an area of significant concern which is distinctly separate from the site of her initial biopsy. 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   This area however spreads over an 8 cm portion of her breast    She is now status post left nipple sparing mastectomy with immediate tissue expander reconstruction on 7/27/2021. PATHOLOGY REVEALS:  FINAL DIAGNOSIS:     A.  Breast, left skin and nipple sparing mastectomy:   1.  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 2.  In situ carcinoma demonstrates extensive intraluminal mucin   production. 3.  In situ carcinoma measures at least 8.0 cm in greatest dimension. 4.  In situ carcinoma extends to within 0.1 cm of the deep surgical   excision margin and 0.4 cm of the anterior margin. 5.  Negative for invasive carcinoma. B.  Breast, excision of additional breast tissue from beneath nipple:   Benign breast parenchyma. C.  Breast, excision of additional left breast anterior margin: Benign   breast parenchyma. AJCC STAGE: pTis, pNx, pMx     She subsequently had a pulmonary embolus and is now on Eliquis for 3 months. We have been doing her fills in lieu of proceeding with her autologous reconstruction. I have discussed her at length with Walter Crenshaw. PHYSICAL EXAM:  The  wounds look good with no evidence of infection, fluid accumulation, or skin necrosis. She still needs additional fills. An additional 90 cc was instilled and she was pleased. IMPRESSION:    Doing well s/p left nipple sparing mastectomy   Pulmonary embolus on Eliquis    PLAN:, she is due for her final reconstructive surgery in February    We will see her back in about May with a unilateral right mammogram.      I have seen, examined and reviewed this patient medication list, appropriate labs and imaging studies. I reviewed relevant medical records and others physicians notes. I discussed the plans of care with the patient. I answered all the questions to the patients satisfaction.   I, Dr Mane Easton, personally performed the services described in this documentation as scribed by Aby Crane Annita Mukherjee MA in my presence and is both accurate and complete. (Please note that portions of this note were completed with a voice recognition program. Efforts were made to edit the dictations but occasionally words are mis-transcribed.)  Over 50% of the total visit time of  20 minutes in face to face encounter with the patient, out of which more than 50% of the time was spent in counseling patient or family and coordination of care. Counseling included but was not limited to time spent reviewing labs, imaging studies/ treatment plan and answering questions.

## 2022-01-26 ENCOUNTER — OFFICE VISIT (OUTPATIENT)
Dept: SURGERY | Age: 52
End: 2022-01-26
Payer: COMMERCIAL

## 2022-01-26 VITALS
TEMPERATURE: 97.8 F | WEIGHT: 194.6 LBS | HEIGHT: 66 IN | SYSTOLIC BLOOD PRESSURE: 124 MMHG | DIASTOLIC BLOOD PRESSURE: 68 MMHG | BODY MASS INDEX: 31.27 KG/M2

## 2022-01-26 DIAGNOSIS — D05.12 DUCTAL CARCINOMA IN SITU (DCIS) OF LEFT BREAST: ICD-10-CM

## 2022-01-26 DIAGNOSIS — Z90.12 S/P LEFT MASTECTOMY: Primary | ICD-10-CM

## 2022-01-26 PROCEDURE — 99213 OFFICE O/P EST LOW 20 MIN: CPT | Performed by: SURGERY

## 2022-02-08 DIAGNOSIS — D05.12 DUCTAL CARCINOMA IN SITU (DCIS) OF LEFT BREAST: ICD-10-CM

## 2022-02-08 DIAGNOSIS — Z90.12 S/P LEFT MASTECTOMY: Primary | ICD-10-CM

## 2022-02-16 ENCOUNTER — PATIENT MESSAGE (OUTPATIENT)
Dept: HEMATOLOGY | Age: 52
End: 2022-02-16

## 2022-02-16 DIAGNOSIS — Z01.818 PRE-OP TESTING: Primary | ICD-10-CM

## 2022-02-16 NOTE — TELEPHONE ENCOUNTER
Called patient regarding Lovenox injections. Instructed patient that she her last does of Eliquis will be on 02/18 and she will start her Lovenox injections on 02/19. Will take them twice a day on 02/19 and 02/20. Will then take only one injection on 02/21 since surgery will be early on 02/22. States that she will be in the hospital until 02/25. Instructed that she can restart her Eliquis on the night of the 25th, as long as surgeon approves. Patient v/u.

## 2022-02-18 DIAGNOSIS — Z01.818 PRE-OP TESTING: ICD-10-CM

## 2022-02-18 LAB — SARS-COV-2, PCR: NOT DETECTED

## 2022-02-25 ENCOUNTER — PATIENT MESSAGE (OUTPATIENT)
Dept: HEMATOLOGY | Age: 52
End: 2022-02-25

## 2022-02-28 ENCOUNTER — HOSPITAL ENCOUNTER (OUTPATIENT)
Dept: WOMENS IMAGING | Age: 52
Discharge: HOME | End: 2022-02-28
Payer: COMMERCIAL

## 2022-02-28 ENCOUNTER — OFFICE VISIT (OUTPATIENT)
Dept: SURGERY | Age: 52
End: 2022-02-28
Payer: COMMERCIAL

## 2022-02-28 VITALS
HEIGHT: 66 IN | OXYGEN SATURATION: 100 % | WEIGHT: 189 LBS | BODY MASS INDEX: 30.37 KG/M2 | SYSTOLIC BLOOD PRESSURE: 117 MMHG | DIASTOLIC BLOOD PRESSURE: 78 MMHG | TEMPERATURE: 99.1 F | HEART RATE: 135 BPM

## 2022-02-28 DIAGNOSIS — Z48.89 ENCOUNTER FOR POSTOPERATIVE CARE: Primary | ICD-10-CM

## 2022-02-28 DIAGNOSIS — Z48.89 ENCOUNTER FOR POSTOPERATIVE CARE: ICD-10-CM

## 2022-02-28 DIAGNOSIS — L76.32 POSTOPERATIVE HEMATOMA OF SKIN FOLLOWING NON-DERMATOLOGIC PROCEDURE: Primary | ICD-10-CM

## 2022-02-28 PROCEDURE — 76705 ECHO EXAM OF ABDOMEN: CPT

## 2022-02-28 PROCEDURE — 99213 OFFICE O/P EST LOW 20 MIN: CPT | Performed by: PHYSICIAN ASSISTANT

## 2022-03-01 NOTE — TELEPHONE ENCOUNTER
Called patient on 02/28/2022. [facility] are waiting on a phone call from Dr Julio Cesar Richardson. Patient will call with update. Instructed to call if she needs anything. Patient v/u. Patient sent message that Dr Julio Cesar Richardson wants her to start back on Eliquis 5mg x2 nights and will then follow up with her on 03/02/2022.

## 2022-03-02 NOTE — PROGRESS NOTES
Ms. Cecil Sarabia presents to the office today at the request of Dr. Dominique Juarez. She had DIP flap reconstruction on 2/22. She had been doing well until she noticed bright red blood in her left abdominal drain. She spoke with Dr. Dominique Juarez yesterday and has been wearing her binder. He drainage has decreased since then. An US was done today at the request of Dr. Dominique Juarez and the results are noted below. EXAMINATION: US ABDOMEN LIMITED 2/28/2022 4:10 PM   HISTORY: A fluid collection in the lower anterior abdominal wall   Images are obtained transverse longitudinal direction usual manner. A   fluid collection is present. This is approximately 5.2 x 5.8 x 2.5 cm. A drain is seen in this fluid collection.       Impression   Probable postsurgical seroma the lower anterior abdominal   wall. A drainage catheter is present in the fluid collection. On exam, there is normal postoperative swelling, but overall the abdomen is soft. There is bruising below the umbilicus in the area where the fluid was seen on US. The drain was stripped well. IMPRESSION:  Post operative bleeding    PLAN:  There does not appear to be any active bleeding at this time. She nathen continue to hold the lovenox per Dr. Eduin Valencia recommendation. I spoke with Javier Robles NP at their office and gave her a report of my findings. She will discuss with Dr. Dominique Juarez and contact the patient with instructions on next steps and when they will see her back.

## 2022-03-11 ENCOUNTER — OFFICE VISIT (OUTPATIENT)
Dept: SURGERY | Age: 52
End: 2022-03-11
Payer: COMMERCIAL

## 2022-03-11 VITALS
HEART RATE: 120 BPM | WEIGHT: 194 LBS | HEIGHT: 65 IN | OXYGEN SATURATION: 98 % | BODY MASS INDEX: 32.32 KG/M2 | TEMPERATURE: 98 F

## 2022-03-11 DIAGNOSIS — L03.319 CELLULITIS OF TRUNK, UNSPECIFIED SITE OF TRUNK: Primary | ICD-10-CM

## 2022-03-11 PROCEDURE — 99212 OFFICE O/P EST SF 10 MIN: CPT | Performed by: PHYSICIAN ASSISTANT

## 2022-03-11 RX ORDER — AMOXICILLIN AND CLAVULANATE POTASSIUM 875; 125 MG/1; MG/1
1 TABLET, FILM COATED ORAL 2 TIMES DAILY
Qty: 14 TABLET | Refills: 0 | Status: SHIPPED | OUTPATIENT
Start: 2022-03-11 | End: 2022-03-18

## 2022-03-11 NOTE — PROGRESS NOTES
Siria Roger presents for a wound check at the request of Prieto Beck NP at Dr. Carlos [facility] office. She had her drain removed on 3/8. She says the next day she noticed redness and since then has had a fever as high as 103. She was started on minocycline yesterday. On exam there is cellulitis from the left corner of her incision (where the drain was removed) stretching laterally to her left gluteal area. I used a q-tip to gently open the corner of the incision where the drain was. A couple of drops of serosanguinous fluid was expressed. There was no pus. A picture was taken for her chart. The borders of the cellulitis was marked. IMPRESSION:  Cellulitis    PLAN:    I recommended she continue the minocycline, but I also added augmentin. She states the redness came on rather quickly and with her fever I am concerned about strep. I told her if the infection spreads outside of the chun to call Dr. Tuan Little and let them know.

## 2022-03-14 ENCOUNTER — OFFICE VISIT (OUTPATIENT)
Dept: PRIMARY CARE CLINIC | Age: 52
End: 2022-03-14
Payer: COMMERCIAL

## 2022-03-14 VITALS
SYSTOLIC BLOOD PRESSURE: 102 MMHG | HEIGHT: 65 IN | TEMPERATURE: 99.5 F | WEIGHT: 195 LBS | BODY MASS INDEX: 32.49 KG/M2 | OXYGEN SATURATION: 99 % | DIASTOLIC BLOOD PRESSURE: 60 MMHG | HEART RATE: 108 BPM

## 2022-03-14 DIAGNOSIS — R10.817 GENERALIZED ABDOMINAL TENDERNESS, REBOUND TENDERNESS PRESENCE NOT SPECIFIED: ICD-10-CM

## 2022-03-14 DIAGNOSIS — R10.817 GENERALIZED ABDOMINAL TENDERNESS, REBOUND TENDERNESS PRESENCE NOT SPECIFIED: Primary | ICD-10-CM

## 2022-03-14 DIAGNOSIS — R50.9 FEVER, UNSPECIFIED FEVER CAUSE: ICD-10-CM

## 2022-03-14 LAB
ALBUMIN SERPL-MCNC: 3.7 G/DL (ref 3.5–5.2)
ALP BLD-CCNC: 92 U/L (ref 35–104)
ALT SERPL-CCNC: 35 U/L (ref 5–33)
ANION GAP SERPL CALCULATED.3IONS-SCNC: 13 MMOL/L (ref 7–19)
APPEARANCE FLUID: CLEAR
AST SERPL-CCNC: 24 U/L (ref 5–32)
BASOPHILS ABSOLUTE: 0 K/UL (ref 0–0.2)
BASOPHILS RELATIVE PERCENT: 0.3 % (ref 0–1)
BILIRUB SERPL-MCNC: 0.3 MG/DL (ref 0.2–1.2)
BILIRUBIN, POC: NORMAL
BLOOD URINE, POC: NORMAL
BUN BLDV-MCNC: 13 MG/DL (ref 6–20)
CALCIUM SERPL-MCNC: 9.2 MG/DL (ref 8.6–10)
CHLORIDE BLD-SCNC: 97 MMOL/L (ref 98–111)
CLARITY, POC: CLEAR
CO2: 23 MMOL/L (ref 22–29)
COLOR, POC: YELLOW
CREAT SERPL-MCNC: 0.5 MG/DL (ref 0.5–0.9)
EOSINOPHILS ABSOLUTE: 0.1 K/UL (ref 0–0.6)
EOSINOPHILS RELATIVE PERCENT: 0.5 % (ref 0–5)
GFR AFRICAN AMERICAN: >59
GFR NON-AFRICAN AMERICAN: >60
GLUCOSE BLD-MCNC: 111 MG/DL (ref 74–109)
GLUCOSE URINE, POC: NORMAL
HCT VFR BLD CALC: 31.6 % (ref 37–47)
HEMOGLOBIN: 9.5 G/DL (ref 12–16)
IMMATURE GRANULOCYTES #: 0.1 K/UL
INFLUENZA A ANTIBODY: NEGATIVE
INFLUENZA B ANTIBODY: NEGATIVE
KETONES, POC: NORMAL
LACTATE DEHYDROGENASE: 204 U/L (ref 91–215)
LEUKOCYTE EST, POC: NORMAL
LYMPHOCYTES ABSOLUTE: 1.4 K/UL (ref 1.1–4.5)
LYMPHOCYTES RELATIVE PERCENT: 10.6 % (ref 20–40)
MCH RBC QN AUTO: 30.4 PG (ref 27–31)
MCHC RBC AUTO-ENTMCNC: 30.1 G/DL (ref 33–37)
MCV RBC AUTO: 101.3 FL (ref 81–99)
MONOCYTES ABSOLUTE: 1 K/UL (ref 0–0.9)
MONOCYTES RELATIVE PERCENT: 7.9 % (ref 0–10)
NEUTROPHILS ABSOLUTE: 10.6 K/UL (ref 1.5–7.5)
NEUTROPHILS RELATIVE PERCENT: 80.2 % (ref 50–65)
NITRITE, POC: NORMAL
PDW BLD-RTO: 15 % (ref 11.5–14.5)
PH, POC: 6
PLATELET # BLD: 444 K/UL (ref 130–400)
PMV BLD AUTO: 9.7 FL (ref 9.4–12.3)
POTASSIUM SERPL-SCNC: 4.3 MMOL/L (ref 3.5–5)
PROTEIN, POC: NORMAL
RBC # BLD: 3.12 M/UL (ref 4.2–5.4)
SARS-COV-2, PCR: NOT DETECTED
SODIUM BLD-SCNC: 133 MMOL/L (ref 136–145)
SPECIFIC GRAVITY, POC: 1.03
TOTAL PROTEIN: 7.4 G/DL (ref 6.6–8.7)
UROBILINOGEN, POC: NORMAL
WBC # BLD: 13.2 K/UL (ref 4.8–10.8)

## 2022-03-14 PROCEDURE — 99214 OFFICE O/P EST MOD 30 MIN: CPT | Performed by: NURSE PRACTITIONER

## 2022-03-14 PROCEDURE — 81002 URINALYSIS NONAUTO W/O SCOPE: CPT | Performed by: NURSE PRACTITIONER

## 2022-03-14 PROCEDURE — 87804 INFLUENZA ASSAY W/OPTIC: CPT | Performed by: NURSE PRACTITIONER

## 2022-03-14 RX ORDER — MINOCYCLINE HYDROCHLORIDE 100 MG/1
CAPSULE ORAL
COMMUNITY
Start: 2022-03-10 | End: 2022-04-25 | Stop reason: ALTCHOICE

## 2022-03-14 ASSESSMENT — ENCOUNTER SYMPTOMS
RHINORRHEA: 0
ALLERGIC/IMMUNOLOGIC NEGATIVE: 1
DIARRHEA: 0
COUGH: 0
CONSTIPATION: 0
BLOOD IN STOOL: 0
RESPIRATORY NEGATIVE: 1
VOMITING: 0
SHORTNESS OF BREATH: 0
EYES NEGATIVE: 1
NAUSEA: 1
SORE THROAT: 0
ABDOMINAL PAIN: 1

## 2022-03-14 NOTE — PROGRESS NOTES
Formerly [facility] PHYSICIAN SERVICES  [facility] JUAN Travis [address] [address] [place] [address]  Dept: [phone]  Dept Fax: [phone]  Loc: [phone]    Rupert Wong is a 54 y.o. female who presents today for her medical conditions/complaints as noted below. Rupert Wong is c/o of Fever (and body aches since Wednesday.  )        HPI:     HPI   This 51-year-old female presents today for fever and body aches. She states this started last Wednesday. She says every night it gets up to 103. Today it has been over 100 most of the day. She had reconstructive breast surgery on 2022. She did have an episode of cellulitis to the left flank at the end of her transverse incision site. She states that that has gotten much better she is currently on 2 antibiotics. She was placed on minocycline and Augmentin by 2 different providers which has not helped reduce fever which led the providers to believe that it is coming from a different source. She was advised to come in to further investigate. She states that she has not had any blood work drawn yet. Chief Complaint   Patient presents with    Fever     and body aches since Wednesday.        Past Medical History:   Diagnosis Date    Cancer [facility])     DCIS     delivery delivered     Ductal carcinoma in situ (DCIS) of left breast 2021    History of D&C     1993    Hypertension       Past Surgical History:   Procedure Laterality Date    APPENDECTOMY       SECTION      DILATION AND CURETTAGE OF UTERUS      X2    MASTECTOMY Left 2021    LEFT NIPPLE SPARING MASTECTOMY VIA WISE PATTERN, TISSUE EXPANDER RECONSTRUCTION, PEC BLOCK performed by Li Irwin MD at [facility] [address] LEFT Left 2021     BREAST NEEDLE BIOPSY LEFT 2021 [facility] GENERAL SURGERY       Vitals 3/14/2022 3/11/2022 2022 2022 2022    SYSTOLIC 244 - 313 427 960 106   DIASTOLIC 60 - 78 68 88 84 Site - - - Right Upper Arm - -   Position - - - Sitting - -   Cuff Size - - - Medium Adult - -   Pulse 108 120 135 - - 79   Temp 99.5 98 99.1 97.8 - -   SpO2 99 98 100 - - 98   Weight 195 lb 194 lb 189 lb 194 lb 9.6 oz 188 lb 189 lb 11.2 oz   Height 5' 5\" 5' 5\" 5' 5.5\" 5' 5.5\" 5' 5\" 5' 5\"   Body mass index 32.45 kg/m2 32.28 kg/m2 30.97 kg/m2 31.89 kg/m2 31.28 kg/m2 31.56 kg/m2   Some recent data might be hidden       Family History   Problem Relation Age of Onset    Hypertension Mother     No Known Problems Father     Thyroid Disease Sister     Cancer Maternal Great Grandmother         Unknown       Social History     Tobacco Use    Smoking status: Never Smoker    Smokeless tobacco: Never Used   Substance Use Topics    Alcohol use: Never      Current Outpatient Medications on File Prior to Visit   Medication Sig Dispense Refill    minocycline (MINOCIN;DYNACIN) 100 MG capsule TAKE 1 CAPSULE BY MOUTH TWICE DAILY FOR 7 DAYS      amoxicillin-clavulanate (AUGMENTIN) 875-125 MG per tablet Take 1 tablet by mouth 2 times daily for 7 days 14 tablet 0    Multiple Vitamin (MULTIVITAMIN ADULT PO) Take by mouth      ELIQUIS 5 MG TABS tablet Take 1 tablet by mouth twice daily 60 tablet 5    vitamin D (ERGOCALCIFEROL) 1.25 MG (98178 UT) CAPS capsule Take 1 capsule by mouth once a week 12 capsule 0    Ascorbic Acid (VITAMIN C) 500 MG CAPS       cetirizine (ZYRTEC) 10 MG tablet Take 10 mg by mouth daily      Probiotic Product (PROBIOTIC ADVANCED) CAPS Take 1 capsule by mouth daily      lisinopril (PRINIVIL;ZESTRIL) 5 MG tablet Take 1/2 (one-half) tablet by mouth once daily (Patient not taking: Reported on 3/14/2022) 45 tablet 3    Fluticasone Propionate (FLONASE ALLERGY RELIEF NA)  (Patient not taking: Reported on 3/14/2022)       No current facility-administered medications on file prior to visit.      No Known Allergies    Health Maintenance   Topic Date Due    Hepatitis C screen  Never done    HIV screen Never done    Diabetes screen  Never done    Colorectal Cancer Screen  Never done    DTaP/Tdap/Td vaccine (2 - Td or Tdap) 04/03/2019    COVID-19 Vaccine (3 - Booster for Moderna series) 08/01/2021    Flu vaccine (1) Never done    Depression Screen  04/13/2022    Breast cancer screen  06/02/2022    Potassium monitoring  08/08/2022    Creatinine monitoring  08/08/2022    Lipid screen  04/13/2026    Cervical cancer screen  04/13/2026    Hepatitis A vaccine  Aged Out    Hepatitis B vaccine  Aged Out    Hib vaccine  Aged Out    Meningococcal (ACWY) vaccine  Aged Out    Pneumococcal 0-64 years Vaccine  Aged Out       Subjective:      Review of Systems   Constitutional: Positive for fever. HENT: Negative. Negative for congestion, postnasal drip, rhinorrhea and sore throat. Eyes: Negative. Respiratory: Negative. Negative for cough and shortness of breath. Cardiovascular: Negative. Gastrointestinal: Positive for abdominal pain and nausea. Negative for blood in stool, constipation, diarrhea and vomiting. Endocrine: Negative. Genitourinary: Negative. Negative for difficulty urinating, dysuria, hematuria and vaginal discharge. Musculoskeletal: Positive for myalgias. Skin: Negative. Allergic/Immunologic: Negative. Neurological: Negative. Negative for headaches. Hematological: Negative. Psychiatric/Behavioral: Negative. Objective:     Physical Exam  Vitals and nursing note reviewed. Constitutional:       General: She is not in acute distress. Appearance: Normal appearance. She is not ill-appearing or toxic-appearing. HENT:      Head: Normocephalic and atraumatic. Nose: Nose normal.      Mouth/Throat:      Mouth: Mucous membranes are moist.   Eyes:      Pupils: Pupils are equal, round, and reactive to light. Cardiovascular:      Rate and Rhythm: Normal rate and regular rhythm. Pulses: Normal pulses. Heart sounds: Normal heart sounds.  No murmur heard.      Pulmonary:      Effort: Pulmonary effort is normal. No respiratory distress. Breath sounds: Normal breath sounds. No wheezing, rhonchi or rales. Chest:   Breasts:      Right: Tenderness present. Left: Tenderness present. Comments: Surgical incision site looks well approximated with no redness heat or drainage noted. Bruising noted across bottoms of both breasts. No sign of acute infection  Abdominal:      General: Bowel sounds are normal.      Palpations: Abdomen is soft. Comments: Incision site is well approximated no drainage, redness or heat noted    ED resolving cellulitis to left hip/flank at the end of transverse incision healing no longer red or hot. Musculoskeletal:         General: Normal range of motion. Cervical back: Normal range of motion and neck supple. Skin:     General: Skin is warm and dry. Coloration: Skin is not jaundiced or pale. Findings: No erythema or rash. Neurological:      Mental Status: She is alert and oriented to person, place, and time. Mental status is at baseline. Psychiatric:         Mood and Affect: Mood normal.         Behavior: Behavior normal.       /60   Pulse 108   Temp 99.5 °F (37.5 °C)   Ht 5' 5\" (1.651 m)   Wt 195 lb (88.5 kg)   SpO2 99%   BMI 32.45 kg/m²     Assessment:       Diagnosis Orders   1. Generalized abdominal tenderness, rebound tenderness presence not specified  COVID-19    Culture, Blood 1    Culture, Blood 2    Lactate Dehydrogenase    CBC with Auto Differential    Comprehensive Metabolic Panel    POCT Urinalysis no Micro    POCT Influenza A/B    CT ABDOMEN PELVIS W WO CONTRAST Additional Contrast? Oral    Culture, Urine   2.  Fever, unspecified fever cause  COVID-19    Culture, Blood 1    Culture, Blood 2    Lactate Dehydrogenase    CBC with Auto Differential    Comprehensive Metabolic Panel    POCT Urinalysis no Micro    POCT Influenza A/B         Plan:     Patient states that she is using Tylenol for fever but it just does not resolve it. She states that every night it returns to at least 103. She states her whole body hurts but she feels it is probably secondary to the high fever. She denies any other symptoms there are no upper respiratory symptoms, no constipation or diarrhea. There is some mild nausea. Labs today to include blood cultures x2, lactic acid, CBC with auto differential, CMP. Lab Review   Lab Results   Component Value Date     03/14/2022    K 4.3 03/14/2022    K 4.4 08/08/2021    CL 97 03/14/2022    CO2 23 03/14/2022    BUN 13 03/14/2022    CREATININE 0.5 03/14/2022    GLUCOSE 111 03/14/2022    CALCIUM 9.2 03/14/2022     Lab Results   Component Value Date    WBC 13.2 03/14/2022    HGB 9.5 03/14/2022    HCT 31.6 03/14/2022    .3 03/14/2022     03/14/2022     Lactate dehydrates level is normal.  POCT urinalysis in office today. Results reviewed positive for protein. No indication of nitrites or leukocytes from sending it for culture and sensitivity. POCT influenza testing in office today. Covid testing in office today  POCT influenza negative for both AMB Covid test negative     Patient given educational materials -see patient instructions. Discussed use, benefit, and side effects of prescribed medications. All patient questions answered. Pt voiced understanding. Reviewed health maintenance. Instructed to continue currentmedications, diet and exercise. Patient agreed with treatment plan. Follow up as directed. MEDICATIONS:  No orders of the defined types were placed in this encounter.         ORDERS:  Orders Placed This Encounter   Procedures    Culture, Blood 1    Culture, Blood 2    Culture, Urine    CT ABDOMEN PELVIS W WO CONTRAST Additional Contrast? Oral    COVID-19    Lactate Dehydrogenase    CBC with Auto Differential    Comprehensive Metabolic Panel    [id number]    COVID-19    POCT Urinalysis no Micro    POCT Influenza A/B       Follow-up:  Return in about 3 days (around 3/17/2022). PATIENT INSTRUCTIONS:  There are no Patient Instructions on file for this visit. Electronically signed by ENRIQUE Galvez NP on 3/14/2022 at 4:14 PM    EMR Dragon/transcription disclaimer:  Much of thisencounter note is electronic transcription/translation of spoken language to printed texts. The electronic translation of spoken language may be erroneous, or at times, nonsensical words or phrases may be inadvertentlytranscribed.   Although I have reviewed the note for such errors, some may still exist.

## 2022-03-16 LAB — URINE CULTURE, ROUTINE: NORMAL

## 2022-03-17 RX ORDER — ERGOCALCIFEROL 1.25 MG/1
50000 CAPSULE ORAL WEEKLY
Qty: 12 CAPSULE | Refills: 1 | Status: SHIPPED | OUTPATIENT
Start: 2022-03-17 | End: 2022-08-31

## 2022-03-19 LAB
BLOOD CULTURE, ROUTINE: NORMAL
CULTURE, BLOOD 2: NORMAL

## 2022-03-22 NOTE — PROGRESS NOTES
Progress Note      Pt Name: Kameron Pun: 1970  MRN: [id number]    Date of evaluation: 03/23/2022  History Obtained From:  patient, electronic medical record    CHIEF COMPLAINT:    Chief Complaint   Patient presents with    Follow-up     Ductal carcinoma in situ (DCIS) of left breast    Anemia    Other     anticoagulation for history of provoked PE     HISTORY OF PRESENT ILLNESS:    Hortencia Morales is a 54 y.o.  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 PATRICIA was initiated on preventive endocrine therapy with tamoxifen 20 mg daily on 6/21/2021 followed by left nipple sparing mastectomy with immediate tissue expander reconstruction on 7/27/2021. She presented to [address] [place] [address] ED on 8/6/2021 right sharp pleuritic chest discomfort and shortness of breath. CTA of the chest revealed bilateral pulmonary embolus with no evidence of right heart strain and she was initiated on heparin drip and then transitioned to Eliquis on 8/8/2021. PATRICIA returns today in scheduled follow-up for evaluation, lab monitoring and further treatment recommendations. Preventive endocrine therapy has been on hold she has continued with therapeutic Eliquis 5 mg p.o. twice daily. Plan is to readdress preventive therapy after completion of left breast reconstruction. At that time, if declared postmenopausal will consider initiating preventative Femara 2.5 mg daily. PATRICIA presents today indicating that she feels she is recovering well from  DIP flap reconstruction on 02//22/2022 per Dr Edward Cespedes. She did experience some postoperative bleeding which led to a drop in hemoglobin and she was placed on ferrous sulfate 325 mg 3 times a day by Dr. Edward Cespedes. She denies any further bleeding. Hemoglobin is stable at 10.0 with hematocrit of 32.4 today, will evaluate iron substrates.     She reports that she had her IUD removed on 1/14/2022. She has decided to move forward with hysterectomy now versus in the fall and is seeing Dr Ally Barrow and planning on hysterectomy in April or May 2022. Charissa Tapia also indicated that she will be having a second part of her reconstruction surgery this summer. She was seen by KATHLEEN Santo on 03/02/2022 and 03/11/2022 for follow up redness from drain removal.      Upon exam appears to have well-healing surgical sites, left breast, right breast and lower abdominal.    Lab results reviewed with patient today and are documented below. ONCOLOGIC HISTORY:  Ductal Carcinoma in Situ of the left breast, ER 94%, stage 0, pTis, pNx, pMx  Charissa Tapia was seen in initial medical oncology and hematology consultation on 8/7/2021 regarding bilateral pulmonary emboli in the setting of a recent left mastectomy for DCIS.   Charissa Tapia established care and a well visit with [facility] on 4/13/2021.  A Pap smear was completed and she was incidentally noted to have an elevated blood pressure and placed on lisinopril with recommendations for close monitoring of BP.  Orders were also given for routine screening arrangements were made for bilateral mammogram.     Bilateral mammogram at [facility] on 4/19/2021  · Left breast with multiple groups of calcifications, which have increased in size and number compared to 07/14/2014.    · No mammographic evidence of malignancy in the right breast.      Left mammogram with CAD at [facility] on 4/23/2021  · Multiple similar appearing groups of indeterminate calcifications in   the left breast. Recommend stereotactic left breast biopsy.      Ultrasound-guided left breast biopsy at [facility] on 4/30/2021   · Pathology indicating atypical ductal hyperplasia.    · Tissue was sent to Dr. Jarad Garza for consultation and she reported \"the changes are concerning for low-grade ductal carcinoma in situ, but are insufficiently developed to establish the diagnosis and the sample.  Final classification should be made on excisional biopsy specimen\".     MRI of bilateral breast on 5/26/2021 at [facility]  · Large 8.5 x 3.3 x 5.4 cm area of regional non-mass enhancement in the LEFT upper outer breast, with differential diagnosis including DCIS. · No suspicious focal mass in either breast. No focal mass adjacentto the LEFT breast biopsy clip marker. · No axillary or internal mammary lymphadenopathy      Jennifer Velazquez was seen in initial consultation by KATHLEEN David on 5/21/2021 followed by an appointment with Dr. Moe Rey on 5/27/2021.     Left breast biopsy on 6/2/2021 by Dr. Moe Rey  · Micropapillary ductal carcinoma in situ.   · Negative for invasion  · ER is positive at 94%.       Seen by Dr. Moe Rey on 6/21/2021 and initiated on preventive tamoxifen 20 mg PO.       Left nipple sparing mastectomy with immediate tissue expander reconstruction on 7/27/2021 by Dr. Moe Rey  Pathology:   1.  Extensive low-grade ductal carcinoma in situ (micropapillary and cribriform types). 2.  In situ carcinoma demonstrates extensive intraluminal mucin production. 3.  In situ carcinoma measures at least 8.0 cm in greatest dimension. 4.  In situ carcinoma extends to within 0.1 cm of the deep surgical excision margin and 0.4 cm of the anterior margin. 5.  Negative for invasive carcinoma. AJCC STAGE: pTis, pNx, pMx      Dr. Magui Vieyra discussed the following with Dr. Vincent Carrillo who is in agreement with holding tamoxifen until after completion of left breast reconstruction. At that time if declared postmenopausal can consider initiating preventative Femara or will consider resuming tamoxifen. Tamoxifen was started in the preventative setting. This of course is to prevent new disease developments over the future years in the right breast.  Although Jennifer Velazquez will be on anticoagulation, she does have future surgeries scheduled for reconstruction.    Hua Mooney prefered that she be off of tamoxifen which can be a prothrombotic agent until after all of that has been accomplished.     Recommend stopping Tamoxifen due to thrombotic risk factor on 8/7/2021  The issue of preventive therapy will be addressed after completion of left breast reconstruction.    At that time, if declared postmenopausal will consider initiating preventative Femara. Reports irregular menstrual cycles and the last menstrual cycle was in March 2021.        DIP flap reconstruction on 02//22/2022 per Dr Reuben Davis. She did experience some postoperative bleeding which led to a drop in hemoglobin and she was placed on ferrous sulfate 325 mg 3 times a day by Dr. Reuben Davis. IUD removed on 1/14/2022. She has decided to move forward with hysterectomy now versus in the fall and is seeing Dr Root November and planning on hysterectomy in April or May 2022. Treatment summary:  · Initiated on preventative tamoxifen 20 mg on 6/21/2021  · Left nipple sparing mastectomy with immediate tissue expander reconstruction on 7/27/2021 by Dr. Carlos Arreaga  · Preventative tamoxifen held on 8/7/2021       HEMATOLOGIC HISTORY:  Provoked bilateral pulmonary emboli, 8/6/2021 (suspect secondary to tamoxifen and surgical intervention)  Courtney Galo was admitted to [facility] to the ED on 8/6/2021 with bilateral pulmonary emboli.   Courtney Galo presented to [address] [place] [address] ED with right-sided sharp pleuritic chest discomfort over the previous 12-24 hours. The pain was a stabbing knifelike sensation    CTA of the chest with contrast on 8/6/2021  · Small to moderate amount of pulmonary embolus greatest in the right lower lobe pulmonary artery branches. There is bilateral pulmonary embolus. · No definite CT evidence of right heart strain.   · Mild cardiomegaly. · Focal parenchymal consolidation in the right lower lobe with surrounding groundglass opacity in the same distribution as thegreatest amount of pulmonary embolus.  This may represent a pulmonary infarct.     PT 14.0/INR 1.06 and aPTT 21.3 on 2021  COVID-19, rapid negative on 2021     Margarito Cannon was placed on on heparin drip and admitted for further evaluation and recommendations.     Noninvasive venous study of bilateral lower extremities  on 2021 with no evidence of DVT, SVT or reflux. Serology studies on 2021  · Anticardiolipin IgA 1.5, IgG 1.3, IgM 3.8 -negative  · Beta 2 Glycoprotein IgG 0, IgM 2 (0-20)  · Cardiolipin antibodies IgG <10, IgM <10 (0-14)  · Protein C Ag >95 (%)  · Protein S Ag 107 (%)  · Antithrombin activity 92 ()  · Antithrombin Ag 76 ()  · Lupus anticoagulant-  not detected  · Factor V Leiden - negative  · Prothrombin gene mutation - negative  · MTHFR: compound heterozygous Mutation C677T and Mutation [id number] (may be associated with a mild decrease in MTHFR enzyme activity although clinically insignificant and most likely not a factor recent finding of pulmonary emboli)   · Homocysteine 5 (0-15)    Heparin discontinued and initiated Eliquis 10 mg p.o. twice daily x7 days followed by Eliquis 5 mg p.o. twice daily on 2021.     JAK2 V617F flex to CA LR/exon 12-15/MPL was negative on 2021 CTA Chest- No evidence of embolic disease.  There is no residual from a emboli  described 2021.    2021 FSH- 77.2 (postmenopause)    Past Medical History:    Past Medical History:   Diagnosis Date    Cancer ([facility] [address]     DCIS     delivery delivered     Ductal carcinoma in situ (DCIS) of left breast 2021    History of D&C     1993    Hypertension        Past Surgical History:    Past Surgical History:   Procedure Laterality Date    APPENDECTOMY       SECTION      DILATION AND CURETTAGE OF UTERUS      X2    MASTECTOMY Left 2021    LEFT NIPPLE SPARING MASTECTOMY VIA WISE PATTERN, TISSUE EXPANDER RECONSTRUCTION, PEC BLOCK performed by Dorinda Tirado MD at [address] BIOPSY LEFT Left 6/2/2021     BREAST NEEDLE BIOPSY LEFT 6/2/2021 [facility] GENERAL SURGERY       Current Medications:    Current Outpatient Medications   Medication Sig Dispense Refill    apixaban (ELIQUIS) 5 MG TABS tablet Take 1 tablet by mouth 2 times daily 60 tablet 5    vitamin D (ERGOCALCIFEROL) 1.25 MG (97695 UT) CAPS capsule Take 1 capsule by mouth once a week 12 capsule 1    minocycline (MINOCIN;DYNACIN) 100 MG capsule TAKE 1 CAPSULE BY MOUTH TWICE DAILY FOR 7 DAYS      Multiple Vitamin (MULTIVITAMIN ADULT PO) Take by mouth      Ascorbic Acid (VITAMIN C) 500 MG CAPS       cetirizine (ZYRTEC) 10 MG tablet Take 10 mg by mouth daily      Probiotic Product (PROBIOTIC ADVANCED) CAPS Take 1 capsule by mouth daily      lisinopril (PRINIVIL;ZESTRIL) 5 MG tablet Take 1/2 (one-half) tablet by mouth once daily (Patient not taking: Reported on 3/14/2022) 45 tablet 3    Fluticasone Propionate (FLONASE ALLERGY RELIEF NA)  (Patient not taking: Reported on 3/14/2022)       No current facility-administered medications for this visit. Allergies: No Known Allergies    Social History:    Social History     Tobacco Use    Smoking status: Never Smoker    Smokeless tobacco: Never Used   Vaping Use    Vaping Use: Never used   Substance Use Topics    Alcohol use: Never    Drug use: Never       Family History:   Family History   Problem Relation Age of Onset    Hypertension Mother     No Known Problems Father     Thyroid Disease Sister     Cancer Maternal Great Grandmother         Unknown       Vitals:  Vitals:    03/23/22 1329   BP: (!) 140/74   Pulse: 77   SpO2: 99%   Weight: 194 lb (88 kg)        Subjective   REVIEW OF SYSTEMS:   Review of Systems   Constitutional: Positive for fatigue. Respiratory: Positive for shortness of breath. Neurological: Positive for weakness. Objective   PHYSICAL EXAM:  Physical Exam  Vitals reviewed. Constitutional:       General: She is not in acute distress. Appearance: She is well-developed. She is not diaphoretic. HENT:      Head: Normocephalic and atraumatic. Mouth/Throat:      Pharynx: Uvula midline. Tonsils: No tonsillar exudate. Eyes:      General: Lids are normal. No scleral icterus. Right eye: No discharge. Left eye: No discharge. Conjunctiva/sclera: Conjunctivae normal.      Pupils: Pupils are equal, round, and reactive to light. Neck:      Thyroid: No thyroid mass or thyromegaly. Vascular: No JVD. Trachea: Trachea normal. No tracheal deviation. Cardiovascular:      Rate and Rhythm: Normal rate and regular rhythm. Heart sounds: Normal heart sounds. No murmur heard. No friction rub. No gallop. Pulmonary:      Effort: Pulmonary effort is normal. No respiratory distress. Breath sounds: Normal breath sounds. No wheezing or rales. Chest:      Chest wall: No tenderness. Abdominal:      General: Bowel sounds are normal. There is no distension. Palpations: Abdomen is soft. There is no mass. Tenderness: There is no abdominal tenderness. There is no guarding or rebound. Hernia: No hernia is present. Musculoskeletal:         General: No tenderness or deformity. Cervical back: Normal range of motion and neck supple. Comments: Range of motion within normal limits x4 extremities   Skin:     General: Skin is warm. Coloration: Skin is not pale. Findings: No erythema or rash. Neurological:      Mental Status: She is alert and oriented to person, place, and time. Cranial Nerves: No cranial nerve deficit. Coordination: Coordination normal.   Psychiatric:         Behavior: Behavior normal.         Thought Content:  Thought content normal.       Labs reviewed today:  Lab Results   Component Value Date    WBC 8.38 03/23/2022    HGB 10.0 (L) 03/23/2022    HCT 32.4 (L) 03/23/2022    MCV 97.6 (H) 03/23/2022     (H) 03/23/2022     Lab Results   Component Value Date NEUTROABS 5.36 03/23/2022         ASSESSMENT/PLAN:      1. 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 Initiated on preventive tamoxifen 20 mg p.o. daily by Dr. Neena Gonzalez on 6/21/2021 and placed on hold on 8/7/2021 due to provoked bilateral pulmonary emboli. It is preferred that she be off of tamoxifen which can be a prothrombotic agent until after all of that has been accomplished. Plan to readdress preventive therapy after completion of left breast reconstruction. At that time, if declared postmenopausal will consider initiating preventative Femara 2.5 mg daily. DIP flap reconstruction on 02//22/2022 per Dr Yovanny Doe. Anticipate second part of her reconstruction surgery this summer. She reports that she had her IUD removed on 1/14/2022. She has decided to move forward with hysterectomy now versus in the fall and is seeing Dr Shahla Bauman and planning on hysterectomy in April or May 2022. Upon exam appears to have well-healing surgical sites, left breast, right breast and lower abdominal.    -Continue to HOLD tamoxifen  -After hysterectomy will initiate preventive Femara 2.5 mg p.o. daily for anticipated 5 years  -[facility] level    2. Provoked bilateral pulmonary embolism, 8/6/2021. JAK2 V617F flex to CA LR/exon 12-15/MPL was negative on 9/1/2021. Reports compliant with Eliquis 5 mg p.o. twice daily. Denies any active bleeding.    -Continue Eliquis 5 mg p.o. twice daily, plan to continue anticoagulation till has completed all surgical interventions    3. Acute blood loss anemia,  experience some postoperative bleeding which led to a drop in hemoglobin and she was placed on ferrous sulfate 325 mg 3 times a day by Dr. Yovanny Doe. Denies any further bleeding.   Hemoglobin is stable at 10.0 with hematocrit of 32.4 today, will evaluate iron substrates.    -Ferritin and iron panel    I discussed all of the above findings included in the assessment and plan with the patient

## 2022-03-23 ENCOUNTER — OFFICE VISIT (OUTPATIENT)
Dept: HEMATOLOGY | Age: 52
End: 2022-03-23
Payer: COMMERCIAL

## 2022-03-23 ENCOUNTER — HOSPITAL ENCOUNTER (OUTPATIENT)
Dept: INFUSION THERAPY | Age: 52
Discharge: HOME | End: 2022-03-23
Payer: COMMERCIAL

## 2022-03-23 VITALS
DIASTOLIC BLOOD PRESSURE: 74 MMHG | WEIGHT: 194 LBS | SYSTOLIC BLOOD PRESSURE: 140 MMHG | HEART RATE: 77 BPM | BODY MASS INDEX: 32.28 KG/M2 | OXYGEN SATURATION: 99 %

## 2022-03-23 DIAGNOSIS — D64.9 ANEMIA, UNSPECIFIED TYPE: ICD-10-CM

## 2022-03-23 DIAGNOSIS — I26.99 BILATERAL PULMONARY EMBOLISM (HCC): ICD-10-CM

## 2022-03-23 DIAGNOSIS — D05.12 INTRADUCTAL CARCINOMA IN SITU OF LEFT BREAST: Primary | ICD-10-CM

## 2022-03-23 DIAGNOSIS — D05.12 INTRADUCTAL CARCINOMA IN SITU OF LEFT BREAST: ICD-10-CM

## 2022-03-23 DIAGNOSIS — D05.12 DUCTAL CARCINOMA IN SITU (DCIS) OF LEFT BREAST: ICD-10-CM

## 2022-03-23 LAB
ALBUMIN SERPL-MCNC: 4.2 G/DL (ref 3.5–5.2)
ALP BLD-CCNC: 109 U/L (ref 35–104)
ALT SERPL-CCNC: 26 U/L (ref 5–33)
ANION GAP SERPL CALCULATED.3IONS-SCNC: 13 MMOL/L (ref 7–19)
AST SERPL-CCNC: 23 U/L (ref 5–32)
BASOPHILS ABSOLUTE: 0.03 K/UL (ref 0.01–0.08)
BASOPHILS RELATIVE PERCENT: 0.4 % (ref 0.1–1.2)
BILIRUB SERPL-MCNC: <0.2 MG/DL (ref 0.2–1.2)
BUN BLDV-MCNC: 11 MG/DL (ref 6–20)
CALCIUM SERPL-MCNC: 9.3 MG/DL (ref 8.6–10)
CHLORIDE BLD-SCNC: 102 MMOL/L (ref 98–111)
CO2: 25 MMOL/L (ref 22–29)
CREAT SERPL-MCNC: 0.6 MG/DL (ref 0.5–0.9)
EOSINOPHILS ABSOLUTE: 0.53 K/UL (ref 0.04–0.54)
EOSINOPHILS RELATIVE PERCENT: 6.3 % (ref 0.7–7)
FERRITIN: 150.9 NG/ML (ref 13–150)
FOLLICLE STIMULATING HORMONE: 78.5 MIU/ML
GFR AFRICAN AMERICAN: >59
GFR NON-AFRICAN AMERICAN: >60
GLUCOSE BLD-MCNC: 66 MG/DL (ref 74–109)
HCT VFR BLD CALC: 32.4 % (ref 34.1–44.9)
HEMOGLOBIN: 10 G/DL (ref 11.2–15.7)
IRON SATURATION: 59 % (ref 14–50)
IRON: 182 UG/DL (ref 37–145)
LYMPHOCYTES ABSOLUTE: 1.81 K/UL (ref 1.18–3.74)
LYMPHOCYTES RELATIVE PERCENT: 21.6 % (ref 19.3–53.1)
MCH RBC QN AUTO: 30.1 PG (ref 25.6–32.2)
MCHC RBC AUTO-ENTMCNC: 30.9 G/DL (ref 32.3–35.5)
MCV RBC AUTO: 97.6 FL (ref 79.4–94.8)
MONOCYTES ABSOLUTE: 0.65 K/UL (ref 0.24–0.82)
MONOCYTES RELATIVE PERCENT: 7.8 % (ref 4.7–12.5)
NEUTROPHILS ABSOLUTE: 5.36 K/UL (ref 1.56–6.13)
NEUTROPHILS RELATIVE PERCENT: 63.9 % (ref 34–71.1)
PDW BLD-RTO: 14.9 % (ref 11.7–14.4)
PLATELET # BLD: 480 K/UL (ref 182–369)
PMV BLD AUTO: 8.6 FL (ref 7.4–10.4)
POTASSIUM SERPL-SCNC: 4.8 MMOL/L (ref 3.5–5)
RBC # BLD: 3.32 M/UL (ref 3.93–5.22)
SODIUM BLD-SCNC: 140 MMOL/L (ref 136–145)
TOTAL IRON BINDING CAPACITY: 306 UG/DL (ref 250–400)
TOTAL PROTEIN: 7.1 G/DL (ref 6.6–8.7)
WBC # BLD: 8.38 K/UL (ref 3.98–10.04)

## 2022-03-23 PROCEDURE — 85025 COMPLETE CBC W/AUTO DIFF WBC: CPT

## 2022-03-23 PROCEDURE — 99211 OFF/OP EST MAY X REQ PHY/QHP: CPT

## 2022-03-23 PROCEDURE — 36415 COLL VENOUS BLD VENIPUNCTURE: CPT

## 2022-03-23 PROCEDURE — 99214 OFFICE O/P EST MOD 30 MIN: CPT | Performed by: NURSE PRACTITIONER

## 2022-03-23 ASSESSMENT — ENCOUNTER SYMPTOMS: SHORTNESS OF BREATH: 1

## 2022-03-24 ENCOUNTER — TELEPHONE (OUTPATIENT)
Dept: OBGYN CLINIC | Age: 52
End: 2022-03-24

## 2022-03-24 NOTE — TELEPHONE ENCOUNTER
Patient request the office to return her call to schedule Hysterectomy. Please return call. Thank you!

## 2022-03-24 NOTE — TELEPHONE ENCOUNTER
TLH/BSO shilpa'd on 4/28/22. preop on 4/25. Pt has already spoke to Dr. Gina Muniz regarding hysterectomy so appt with AW canceled.  Pt VU

## 2022-04-08 DIAGNOSIS — Z12.31 VISIT FOR SCREENING MAMMOGRAM: Primary | ICD-10-CM

## 2022-04-21 RX ORDER — ENOXAPARIN SODIUM 100 MG/ML
1 INJECTION SUBCUTANEOUS 2 TIMES DAILY
Qty: 7 EACH | Refills: 0 | Status: SHIPPED | OUTPATIENT
Start: 2022-04-21 | End: 2022-05-10

## 2022-04-25 ENCOUNTER — OFFICE VISIT (OUTPATIENT)
Dept: OBGYN CLINIC | Age: 52
End: 2022-04-25
Payer: COMMERCIAL

## 2022-04-25 ENCOUNTER — HOSPITAL ENCOUNTER (OUTPATIENT)
Dept: PREADMISSION TESTING | Age: 52
Discharge: HOME | End: 2022-04-29
Payer: COMMERCIAL

## 2022-04-25 VITALS — WEIGHT: 189 LBS | HEIGHT: 65 IN | BODY MASS INDEX: 31.49 KG/M2

## 2022-04-25 VITALS — HEIGHT: 65 IN | BODY MASS INDEX: 32.99 KG/M2 | WEIGHT: 198 LBS

## 2022-04-25 DIAGNOSIS — D05.12 DUCTAL CARCINOMA IN SITU (DCIS) OF LEFT BREAST: Primary | ICD-10-CM

## 2022-04-25 DIAGNOSIS — D25.9 UTERINE LEIOMYOMA, UNSPECIFIED LOCATION: ICD-10-CM

## 2022-04-25 LAB
ABO/RH: NORMAL
ANTIBODY SCREEN: NORMAL
APTT: 33.9 SEC (ref 26–36.2)
BASOPHILS ABSOLUTE: 0.1 K/UL (ref 0–0.2)
BASOPHILS RELATIVE PERCENT: 0.6 % (ref 0–1)
EOSINOPHILS ABSOLUTE: 0.3 K/UL (ref 0–0.6)
EOSINOPHILS RELATIVE PERCENT: 3.8 % (ref 0–5)
HCT VFR BLD CALC: 41.4 % (ref 37–47)
HEMOGLOBIN: 12.8 G/DL (ref 12–16)
IMMATURE GRANULOCYTES #: 0 K/UL
INR BLD: 1.13 (ref 0.88–1.18)
LYMPHOCYTES ABSOLUTE: 1.9 K/UL (ref 1.1–4.5)
LYMPHOCYTES RELATIVE PERCENT: 22.9 % (ref 20–40)
MCH RBC QN AUTO: 30 PG (ref 27–31)
MCHC RBC AUTO-ENTMCNC: 30.9 G/DL (ref 33–37)
MCV RBC AUTO: 97.2 FL (ref 81–99)
MONOCYTES ABSOLUTE: 0.7 K/UL (ref 0–0.9)
MONOCYTES RELATIVE PERCENT: 8.1 % (ref 0–10)
NEUTROPHILS ABSOLUTE: 5.5 K/UL (ref 1.5–7.5)
NEUTROPHILS RELATIVE PERCENT: 64.4 % (ref 50–65)
PDW BLD-RTO: 13.8 % (ref 11.5–14.5)
PLATELET # BLD: 341 K/UL (ref 130–400)
PMV BLD AUTO: 10.2 FL (ref 9.4–12.3)
PROTHROMBIN TIME: 14.5 SEC (ref 12–14.6)
RBC # BLD: 4.26 M/UL (ref 4.2–5.4)
WBC # BLD: 8.5 K/UL (ref 4.8–10.8)

## 2022-04-25 PROCEDURE — 99211 OFF/OP EST MAY X REQ PHY/QHP: CPT | Performed by: NURSE PRACTITIONER

## 2022-04-25 PROCEDURE — 86901 BLOOD TYPING SEROLOGIC RH(D): CPT

## 2022-04-25 PROCEDURE — 86900 BLOOD TYPING SEROLOGIC ABO: CPT

## 2022-04-25 PROCEDURE — 86850 RBC ANTIBODY SCREEN: CPT

## 2022-04-25 PROCEDURE — 93005 ELECTROCARDIOGRAM TRACING: CPT | Performed by: OBSTETRICS & GYNECOLOGY

## 2022-04-25 PROCEDURE — 85610 PROTHROMBIN TIME: CPT

## 2022-04-25 PROCEDURE — 85025 COMPLETE CBC W/AUTO DIFF WBC: CPT

## 2022-04-25 PROCEDURE — 85730 THROMBOPLASTIN TIME PARTIAL: CPT

## 2022-04-25 RX ORDER — PHENAZOPYRIDINE HYDROCHLORIDE 100 MG/1
100 TABLET, FILM COATED ORAL ONCE
Status: CANCELLED | OUTPATIENT
Start: 2022-04-28

## 2022-04-25 NOTE — PROGRESS NOTES
vitamin D (ERGOCALCIFEROL) 1.25 MG (70935 UT) CAPS capsule Take 1 capsule by mouth once a week (Patient taking differently: Take 50,000 Units by mouth once a week Indications: Wednesday ) 12 capsule 1    Multiple Vitamin (MULTIVITAMIN ADULT PO) Take 1 tablet by mouth daily       lisinopril (PRINIVIL;ZESTRIL) 5 MG tablet Take 1/2 (one-half) tablet by mouth once daily (Patient taking differently: Take 2.5 mg by mouth daily ) 45 tablet 3    Ascorbic Acid (VITAMIN C) 500 MG CAPS Take 500 mg by mouth daily       Fluticasone Propionate (FLONASE ALLERGY RELIEF NA)  (Patient not taking: Reported on 3/14/2022)      cetirizine (ZYRTEC) 10 MG tablet Take 10 mg by mouth daily      Probiotic Product (PROBIOTIC ADVANCED) CAPS Take 1 capsule by mouth daily       No current facility-administered medications for this visit. Allergies: Patient has no known allergies.   Past Medical History:   Diagnosis Date    Cancer [facility])     DCIS     delivery delivered     Ductal carcinoma in situ (DCIS) of left breast 2021    History of D&C     1993    Hx of blood clots     Post op - Mastectomy Eliquis    Hypertension      Past Surgical History:   Procedure Laterality Date    APPENDECTOMY       SECTION      DILATION AND CURETTAGE OF UTERUS      X2    MASTECTOMY Left 2021    LEFT NIPPLE SPARING MASTECTOMY VIA WISE PATTERN, TISSUE EXPANDER RECONSTRUCTION, PEC BLOCK performed by rGeg Bowen MD at [address] LEFT Left 2021    US BREAST NEEDLE BIOPSY LEFT 2021 [facility] SURGERY     Family History   Problem Relation Age of Onset    Hypertension Mother     No Known Problems Father     Thyroid Disease Sister     Cancer Maternal Great Grandmother         Unknown     Social History     Tobacco Use    Smoking status: Never Smoker    Smokeless tobacco: Never Used   Substance Use Topics    Alcohol use: Never       Ht 5' 5\" (1.651 m)   Wt 198 lb (89.8 kg) Breastfeeding No   BMI 32.95 kg/m²        Diagnosis Orders   1. Ductal carcinoma in situ (DCIS) of left breast     2. Uterine leiomyoma, unspecified location           Counseling was offered and accepted by patient. Dr. Ann Harrington discussed at length the risks, benefits and alternatives to surgery including medical management and no intervention at patient's last office visit. Discussed the surgical procedure in detail with patient. Some patients will need a full medical clearance, pt stopped Eliquis and is currently on Lovenox. Colette Pisano with Hem Onc is managing. Preop testing ordered and obtained today. Consents for surgery signed and all questions proceeding. Reviewed signs and symptoms of postoperative infection and complications with patient. All questions answered and  patient voiced understanding of above.

## 2022-04-25 NOTE — DISCHARGE INSTR - COC
Continuity of Care Form    Patient Name: Karthik Farah   :  1970  MRN:  [id number]    Admit date:  2022  Discharge date:  ***    Code Status Order: Prior   Advance Directives:      Admitting Physician:  No admitting provider for patient encounter. PCP: ENRIQUE Ferrer CNP    Discharging Nurse: [facility] Unit/Room#: No information available for this encounter.   Discharging Unit Phone Number: ***    Emergency Contact:   Extended Emergency Contact Information  Primary Emergency Contact: Real [facility]  Address: [address] [place] [address] Phone: [phone]  Relation: Spouse  Secondary Emergency Contact: Adele Aguirre Phone: [phone]  Relation: Parent    Past Surgical History:  Past Surgical History:   Procedure Laterality Date    APPENDECTOMY       SECTION      DILATION AND CURETTAGE OF UTERUS      X2    MASTECTOMY Left 2021    LEFT NIPPLE SPARING MASTECTOMY VIA WISE PATTERN, TISSUE EXPANDER RECONSTRUCTION, PEC BLOCK performed by Mir Figueroa MD at [address] [place] [address] LEFT Left 2021     BREAST NEEDLE BIOPSY LEFT 2021 [facility] GENERAL SURGERY       Immunization History:   Immunization History   Administered Date(s) Administered    COVID-19, Ellan Sox, Primary or Immunocompromised, PF, 100mcg/0.5mL 2021, 2021       Active Problems:  Patient Active Problem List   Diagnosis Code    Atypical ductal hyperplasia of breast N60.99    Lump or mass in breast N63.0    Ductal carcinoma in situ (DCIS) of left breast D05.12    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    S/P left mastectomy with expander reconstruction 2021 Z90.12    Bilateral pulmonary embolism (HCC) I26.99       Isolation/Infection:   Isolation            No Isolation          Patient Infection Status       Infection Onset Added Last Indicated Last Indicated By Review Planned Expiration Resolved Resolved By    None active    Resolved    COVID-19 (Rule Out) 02/18/22 02/18/22 02/18/22 COVID-19 (Ordered)   02/18/22 Rule-Out Test Resulted            Nurse Assessment:  Last Vital Signs: Ht 5' 5\" (1.651 m)   Wt 189 lb (85.7 kg)   LMP 03/25/2021 (Approximate)   BMI 31.45 kg/m²     Last documented pain score (0-10 scale):    Last Weight:   Wt Readings from Last 1 Encounters:   04/25/22 189 lb (85.7 kg)     Mental Status:  {IP PT MENTAL STATUS:20030}    IV Access:  { KOLTON IV ACCESS:290999598}    Nursing Mobility/ADLs:  Walking   {CHP DME SVJU:066141278}  Transfer  {CHP DME CIVK:567633156}  Bathing  {CHP DME RQWM:432267493}  Dressing  {CHP DME JLEV:823865352}  Toileting  {CHP DME ZEDJ:847186674}  Feeding  {CHP DME GNRH:081275155}  Med Admin  {CHP DME FTQL:129025524}  Med Delivery   { KOLTON MED Delivery:938266912}    Wound Care Documentation and Therapy:  Incision 07/27/21 Breast Left (Active)   Number of days: 272        Elimination:  Continence: Bowel: {YES / RS:59974}  Bladder: {YES / VW:64854}  Urinary Catheter: {Urinary Catheter:721797066}   Colostomy/Ileostomy/Ileal Conduit: {YES / FT:24972}       Date of Last BM: ***  No intake or output data in the 24 hours ending 04/25/22 1328  No intake/output data recorded.     Safety Concerns:     [address] Safety Concerns:896683561}    Impairments/Disabilities:      [address] Impairments/Disabilities:409532252}    Nutrition Therapy:  Current Nutrition Therapy:   [address] Diet List:657113550}    Routes of Feeding: {CHP DME Other Feedings:662943889}  Liquids: {Slp liquid thickness:74953}  Daily Fluid Restriction: {CHP DME Yes amt example:075863959}  Last Modified Barium Swallow with Video (Video Swallowing Test): {Done Not Done LUQN:687166086}    Treatments at the Time of Hospital Discharge:   Respiratory Treatments: ***  Oxygen Therapy:  {Therapy; copd oxygen:18043}  Ventilator:    {MH CC Vent NBLP:277117622}    Rehab Therapies: {THERAPEUTIC INTERVENTION:5113937791}  Weight Bearing Status/Restrictions: 50Tr[address] Reynolds CC Weight Bearin}  Other Medical Equipment (for information only, NOT a DME order):  {EQUIPMENT:902212514}  Other Treatments: ***    Patient's personal belongings (please select all that are sent with patient):  {CHP DME Belongings:170701947}    RN SIGNATURE:  {Esignature:139174997}    CASE MANAGEMENT/SOCIAL WORK SECTION    Inpatient Status Date: ***    Readmission Risk Assessment Score:  Readmission Risk              Risk of Unplanned Readmission:  0           Discharging to Facility/ Agency   Name:   Address:  Phone:  Fax:    Dialysis Facility (if applicable)   Name:  Address:  Dialysis Schedule:  Phone:  Fax:    / signature: {Esignature:684416118}    PHYSICIAN SECTION    Prognosis: {Prognosis:2328058964}    Condition at Discharge: Ezekiel Reynolds Patient Condition:165553629}    Rehab Potential (if transferring to Rehab): {Prognosis:2325606085}    Recommended Labs or Other Treatments After Discharge: ***    Physician Certification: I certify the above information and transfer of Britt Hernandez  is necessary for the continuing treatment of the diagnosis listed and that she requires {Admit to Appropriate Level of Care:24251} for {GREATER/LESS:106570875} 30 days.      Update Admission H&P: {CHP DME Changes in ZXRPO:739502353}    PHYSICIAN SIGNATURE:  {Esignature:593558349}

## 2022-04-26 LAB
EKG P AXIS: 47 DEGREES
EKG P-R INTERVAL: 126 MS
EKG Q-T INTERVAL: 378 MS
EKG QRS DURATION: 78 MS
EKG QTC CALCULATION (BAZETT): 406 MS
EKG T AXIS: 13 DEGREES

## 2022-04-26 PROCEDURE — 93010 ELECTROCARDIOGRAM REPORT: CPT | Performed by: INTERNAL MEDICINE

## 2022-04-28 ENCOUNTER — ANESTHESIA EVENT (OUTPATIENT)
Dept: OPERATING ROOM | Age: 52
End: 2022-04-28
Payer: COMMERCIAL

## 2022-04-28 ENCOUNTER — ANESTHESIA (OUTPATIENT)
Dept: OPERATING ROOM | Age: 52
End: 2022-04-28
Payer: COMMERCIAL

## 2022-04-28 ENCOUNTER — HOSPITAL ENCOUNTER (OUTPATIENT)
Age: 52
Setting detail: OUTPATIENT SURGERY
Discharge: HOME | End: 2022-04-28
Attending: OBSTETRICS & GYNECOLOGY | Admitting: OBSTETRICS & GYNECOLOGY
Payer: COMMERCIAL

## 2022-04-28 VITALS — TEMPERATURE: 96.1 F | DIASTOLIC BLOOD PRESSURE: 67 MMHG | OXYGEN SATURATION: 99 % | SYSTOLIC BLOOD PRESSURE: 107 MMHG

## 2022-04-28 VITALS
DIASTOLIC BLOOD PRESSURE: 83 MMHG | HEIGHT: 65 IN | SYSTOLIC BLOOD PRESSURE: 121 MMHG | TEMPERATURE: 97.6 F | OXYGEN SATURATION: 94 % | BODY MASS INDEX: 31.49 KG/M2 | RESPIRATION RATE: 16 BRPM | WEIGHT: 189 LBS | HEART RATE: 56 BPM

## 2022-04-28 DIAGNOSIS — D05.10 DUCTAL CARCINOMA IN SITU (DCIS) OF BREAST, UNSPECIFIED LATERALITY: Primary | ICD-10-CM

## 2022-04-28 DIAGNOSIS — D25.9 UTERINE LEIOMYOMA, UNSPECIFIED LOCATION: ICD-10-CM

## 2022-04-28 LAB
ABO/RH: NORMAL
ANTIBODY SCREEN: NORMAL
HCG(URINE) PREGNANCY TEST: NEGATIVE

## 2022-04-28 PROCEDURE — 2500000003 HC RX 250 WO HCPCS: Performed by: NURSE ANESTHETIST, CERTIFIED REGISTERED

## 2022-04-28 PROCEDURE — 6360000002 HC RX W HCPCS: Performed by: NURSE ANESTHETIST, CERTIFIED REGISTERED

## 2022-04-28 PROCEDURE — 3700000000 HC ANESTHESIA ATTENDED CARE: Performed by: OBSTETRICS & GYNECOLOGY

## 2022-04-28 PROCEDURE — 6370000000 HC RX 637 (ALT 250 FOR IP): Performed by: OBSTETRICS & GYNECOLOGY

## 2022-04-28 PROCEDURE — 88307 TISSUE EXAM BY PATHOLOGIST: CPT

## 2022-04-28 PROCEDURE — 7100000001 HC PACU RECOVERY - ADDTL 15 MIN: Performed by: OBSTETRICS & GYNECOLOGY

## 2022-04-28 PROCEDURE — 7100000010 HC PHASE II RECOVERY - FIRST 15 MIN: Performed by: OBSTETRICS & GYNECOLOGY

## 2022-04-28 PROCEDURE — 6370000000 HC RX 637 (ALT 250 FOR IP): Performed by: ANESTHESIOLOGY

## 2022-04-28 PROCEDURE — 2720000010 HC SURG SUPPLY STERILE: Performed by: OBSTETRICS & GYNECOLOGY

## 2022-04-28 PROCEDURE — 3700000001 HC ADD 15 MINUTES (ANESTHESIA): Performed by: OBSTETRICS & GYNECOLOGY

## 2022-04-28 PROCEDURE — 2580000003 HC RX 258: Performed by: OBSTETRICS & GYNECOLOGY

## 2022-04-28 PROCEDURE — 6360000002 HC RX W HCPCS: Performed by: ANESTHESIOLOGY

## 2022-04-28 PROCEDURE — S2900 ROBOTIC SURGICAL SYSTEM: HCPCS | Performed by: OBSTETRICS & GYNECOLOGY

## 2022-04-28 PROCEDURE — 3600000019 HC SURGERY ROBOT ADDTL 15MIN: Performed by: OBSTETRICS & GYNECOLOGY

## 2022-04-28 PROCEDURE — 7100000000 HC PACU RECOVERY - FIRST 15 MIN: Performed by: OBSTETRICS & GYNECOLOGY

## 2022-04-28 PROCEDURE — 84703 CHORIONIC GONADOTROPIN ASSAY: CPT

## 2022-04-28 PROCEDURE — 2580000003 HC RX 258: Performed by: ANESTHESIOLOGY

## 2022-04-28 PROCEDURE — 86900 BLOOD TYPING SEROLOGIC ABO: CPT

## 2022-04-28 PROCEDURE — 86850 RBC ANTIBODY SCREEN: CPT

## 2022-04-28 PROCEDURE — 6360000002 HC RX W HCPCS: Performed by: OBSTETRICS & GYNECOLOGY

## 2022-04-28 PROCEDURE — 58571 TLH W/T/O 250 G OR LESS: CPT | Performed by: OBSTETRICS & GYNECOLOGY

## 2022-04-28 PROCEDURE — 3600000009 HC SURGERY ROBOT BASE: Performed by: OBSTETRICS & GYNECOLOGY

## 2022-04-28 PROCEDURE — 2709999900 HC NON-CHARGEABLE SUPPLY: Performed by: OBSTETRICS & GYNECOLOGY

## 2022-04-28 PROCEDURE — 86901 BLOOD TYPING SEROLOGIC RH(D): CPT

## 2022-04-28 RX ORDER — MIDAZOLAM HYDROCHLORIDE 1 MG/ML
INJECTION INTRAMUSCULAR; INTRAVENOUS PRN
Status: DISCONTINUED | OUTPATIENT
Start: 2022-04-28 | End: 2022-04-28 | Stop reason: SDUPTHER

## 2022-04-28 RX ORDER — LIDOCAINE HYDROCHLORIDE 10 MG/ML
INJECTION, SOLUTION EPIDURAL; INFILTRATION; INTRACAUDAL; PERINEURAL PRN
Status: DISCONTINUED | OUTPATIENT
Start: 2022-04-28 | End: 2022-04-28 | Stop reason: SDUPTHER

## 2022-04-28 RX ORDER — ROCURONIUM BROMIDE 10 MG/ML
INJECTION, SOLUTION INTRAVENOUS PRN
Status: DISCONTINUED | OUTPATIENT
Start: 2022-04-28 | End: 2022-04-28 | Stop reason: SDUPTHER

## 2022-04-28 RX ORDER — HYDROMORPHONE HYDROCHLORIDE 1 MG/ML
0.25 INJECTION, SOLUTION INTRAMUSCULAR; INTRAVENOUS; SUBCUTANEOUS EVERY 5 MIN PRN
Status: DISCONTINUED | OUTPATIENT
Start: 2022-04-28 | End: 2022-04-28 | Stop reason: HOSPADM

## 2022-04-28 RX ORDER — LIDOCAINE HYDROCHLORIDE 10 MG/ML
1 INJECTION, SOLUTION EPIDURAL; INFILTRATION; INTRACAUDAL; PERINEURAL
Status: DISCONTINUED | OUTPATIENT
Start: 2022-04-28 | End: 2022-04-28 | Stop reason: HOSPADM

## 2022-04-28 RX ORDER — SODIUM CHLORIDE, SODIUM LACTATE, POTASSIUM CHLORIDE, CALCIUM CHLORIDE 600; 310; 30; 20 MG/100ML; MG/100ML; MG/100ML; MG/100ML
INJECTION, SOLUTION INTRAVENOUS CONTINUOUS
Status: DISCONTINUED | OUTPATIENT
Start: 2022-04-28 | End: 2022-04-28 | Stop reason: HOSPADM

## 2022-04-28 RX ORDER — HYDROMORPHONE HYDROCHLORIDE 1 MG/ML
0.5 INJECTION, SOLUTION INTRAMUSCULAR; INTRAVENOUS; SUBCUTANEOUS EVERY 5 MIN PRN
Status: DISCONTINUED | OUTPATIENT
Start: 2022-04-28 | End: 2022-04-28 | Stop reason: HOSPADM

## 2022-04-28 RX ORDER — SODIUM CHLORIDE 0.9 % (FLUSH) 0.9 %
5-40 SYRINGE (ML) INJECTION PRN
Status: DISCONTINUED | OUTPATIENT
Start: 2022-04-28 | End: 2022-04-28 | Stop reason: HOSPADM

## 2022-04-28 RX ORDER — FAMOTIDINE 20 MG/1
20 TABLET, FILM COATED ORAL ONCE
Status: COMPLETED | OUTPATIENT
Start: 2022-04-28 | End: 2022-04-28

## 2022-04-28 RX ORDER — PROPOFOL 10 MG/ML
INJECTION, EMULSION INTRAVENOUS PRN
Status: DISCONTINUED | OUTPATIENT
Start: 2022-04-28 | End: 2022-04-28 | Stop reason: SDUPTHER

## 2022-04-28 RX ORDER — PHENAZOPYRIDINE HYDROCHLORIDE 100 MG/1
100 TABLET, FILM COATED ORAL ONCE
Status: COMPLETED | OUTPATIENT
Start: 2022-04-28 | End: 2022-04-28

## 2022-04-28 RX ORDER — ONDANSETRON 2 MG/ML
INJECTION INTRAMUSCULAR; INTRAVENOUS PRN
Status: DISCONTINUED | OUTPATIENT
Start: 2022-04-28 | End: 2022-04-28 | Stop reason: SDUPTHER

## 2022-04-28 RX ORDER — DIPHENHYDRAMINE HYDROCHLORIDE 50 MG/ML
12.5 INJECTION INTRAMUSCULAR; INTRAVENOUS
Status: DISCONTINUED | OUTPATIENT
Start: 2022-04-28 | End: 2022-04-28 | Stop reason: HOSPADM

## 2022-04-28 RX ORDER — SODIUM CHLORIDE 9 MG/ML
INJECTION, SOLUTION INTRAVENOUS PRN
Status: DISCONTINUED | OUTPATIENT
Start: 2022-04-28 | End: 2022-04-28 | Stop reason: HOSPADM

## 2022-04-28 RX ORDER — HYDROCODONE BITARTRATE AND ACETAMINOPHEN 5; 325 MG/1; MG/1
1 TABLET ORAL EVERY 6 HOURS PRN
Qty: 28 TABLET | Refills: 0 | Status: SHIPPED | OUTPATIENT
Start: 2022-04-28 | End: 2022-05-05

## 2022-04-28 RX ORDER — FENTANYL CITRATE 50 UG/ML
INJECTION, SOLUTION INTRAMUSCULAR; INTRAVENOUS PRN
Status: DISCONTINUED | OUTPATIENT
Start: 2022-04-28 | End: 2022-04-28 | Stop reason: SDUPTHER

## 2022-04-28 RX ORDER — SODIUM CHLORIDE 0.9 % (FLUSH) 0.9 %
5-40 SYRINGE (ML) INJECTION EVERY 12 HOURS SCHEDULED
Status: DISCONTINUED | OUTPATIENT
Start: 2022-04-28 | End: 2022-04-28 | Stop reason: HOSPADM

## 2022-04-28 RX ORDER — SCOLOPAMINE TRANSDERMAL SYSTEM 1 MG/1
1 PATCH, EXTENDED RELEASE TRANSDERMAL
Status: DISCONTINUED | OUTPATIENT
Start: 2022-04-28 | End: 2022-04-28 | Stop reason: HOSPADM

## 2022-04-28 RX ORDER — METOCLOPRAMIDE HYDROCHLORIDE 5 MG/ML
10 INJECTION INTRAMUSCULAR; INTRAVENOUS
Status: COMPLETED | OUTPATIENT
Start: 2022-04-28 | End: 2022-04-28

## 2022-04-28 RX ORDER — DEXAMETHASONE SODIUM PHOSPHATE 10 MG/ML
INJECTION, SOLUTION INTRAMUSCULAR; INTRAVENOUS PRN
Status: DISCONTINUED | OUTPATIENT
Start: 2022-04-28 | End: 2022-04-28 | Stop reason: SDUPTHER

## 2022-04-28 RX ORDER — MIDAZOLAM HYDROCHLORIDE 1 MG/ML
2 INJECTION INTRAMUSCULAR; INTRAVENOUS
Status: DISCONTINUED | OUTPATIENT
Start: 2022-04-28 | End: 2022-04-28 | Stop reason: HOSPADM

## 2022-04-28 RX ORDER — APREPITANT 40 MG/1
40 CAPSULE ORAL ONCE
Status: COMPLETED | OUTPATIENT
Start: 2022-04-28 | End: 2022-04-28

## 2022-04-28 RX ADMIN — APREPITANT 40 MG: 40 CAPSULE ORAL at 07:09

## 2022-04-28 RX ADMIN — LIDOCAINE HYDROCHLORIDE 50 MG: 10 INJECTION, SOLUTION EPIDURAL; INFILTRATION; INTRACAUDAL; PERINEURAL at 08:14

## 2022-04-28 RX ADMIN — PHENAZOPYRIDINE HYDROCHLORIDE 100 MG: 100 TABLET ORAL at 06:31

## 2022-04-28 RX ADMIN — HYDROMORPHONE HYDROCHLORIDE 0.5 MG: 1 INJECTION, SOLUTION INTRAMUSCULAR; INTRAVENOUS; SUBCUTANEOUS at 10:48

## 2022-04-28 RX ADMIN — SODIUM CHLORIDE, SODIUM LACTATE, POTASSIUM CHLORIDE, AND CALCIUM CHLORIDE: 600; 310; 30; 20 INJECTION, SOLUTION INTRAVENOUS at 06:31

## 2022-04-28 RX ADMIN — SODIUM CHLORIDE, SODIUM LACTATE, POTASSIUM CHLORIDE, AND CALCIUM CHLORIDE: 600; 310; 30; 20 INJECTION, SOLUTION INTRAVENOUS at 08:36

## 2022-04-28 RX ADMIN — DEXAMETHASONE SODIUM PHOSPHATE 10 MG: 10 INJECTION, SOLUTION INTRAMUSCULAR; INTRAVENOUS at 08:27

## 2022-04-28 RX ADMIN — FAMOTIDINE 20 MG: 20 TABLET ORAL at 07:08

## 2022-04-28 RX ADMIN — HYDROMORPHONE HYDROCHLORIDE 0.25 MG: 1 INJECTION, SOLUTION INTRAMUSCULAR; INTRAVENOUS; SUBCUTANEOUS at 10:33

## 2022-04-28 RX ADMIN — HYDROMORPHONE HYDROCHLORIDE 0.5 MG: 1 INJECTION, SOLUTION INTRAMUSCULAR; INTRAVENOUS; SUBCUTANEOUS at 10:38

## 2022-04-28 RX ADMIN — ONDANSETRON 4 MG: 2 INJECTION INTRAMUSCULAR; INTRAVENOUS at 09:49

## 2022-04-28 RX ADMIN — METOCLOPRAMIDE 10 MG: 5 INJECTION, SOLUTION INTRAMUSCULAR; INTRAVENOUS at 12:50

## 2022-04-28 RX ADMIN — ROCURONIUM BROMIDE 50 MG: 10 INJECTION, SOLUTION INTRAVENOUS at 08:14

## 2022-04-28 RX ADMIN — SUGAMMADEX 300 MG: 100 INJECTION, SOLUTION INTRAVENOUS at 09:50

## 2022-04-28 RX ADMIN — CEFAZOLIN SODIUM 1000 MG: 1 INJECTION, POWDER, FOR SOLUTION INTRAMUSCULAR; INTRAVENOUS at 08:24

## 2022-04-28 RX ADMIN — PROPOFOL 200 MG: 10 INJECTION, EMULSION INTRAVENOUS at 08:14

## 2022-04-28 RX ADMIN — FENTANYL CITRATE 50 MCG: 50 INJECTION, SOLUTION INTRAMUSCULAR; INTRAVENOUS at 09:35

## 2022-04-28 RX ADMIN — FENTANYL CITRATE 50 MCG: 50 INJECTION, SOLUTION INTRAMUSCULAR; INTRAVENOUS at 09:29

## 2022-04-28 RX ADMIN — FENTANYL CITRATE 100 MCG: 50 INJECTION, SOLUTION INTRAMUSCULAR; INTRAVENOUS at 08:12

## 2022-04-28 RX ADMIN — MIDAZOLAM 2 MG: 1 INJECTION INTRAMUSCULAR; INTRAVENOUS at 08:08

## 2022-04-28 RX ADMIN — HYDROMORPHONE HYDROCHLORIDE 0.25 MG: 1 INJECTION, SOLUTION INTRAMUSCULAR; INTRAVENOUS; SUBCUTANEOUS at 10:28

## 2022-04-28 RX ADMIN — FENTANYL CITRATE 50 MCG: 50 INJECTION, SOLUTION INTRAMUSCULAR; INTRAVENOUS at 09:18

## 2022-04-28 RX ADMIN — FENTANYL CITRATE 50 MCG: 50 INJECTION, SOLUTION INTRAMUSCULAR; INTRAVENOUS at 09:50

## 2022-04-28 RX ADMIN — HYDROMORPHONE HYDROCHLORIDE 0.5 MG: 1 INJECTION, SOLUTION INTRAMUSCULAR; INTRAVENOUS; SUBCUTANEOUS at 10:43

## 2022-04-28 ASSESSMENT — PAIN DESCRIPTION - DESCRIPTORS
DESCRIPTORS: ACHING

## 2022-04-28 ASSESSMENT — PAIN DESCRIPTION - LOCATION
LOCATION: ABDOMEN

## 2022-04-28 ASSESSMENT — PAIN SCALES - GENERAL
PAINLEVEL_OUTOF10: 4
PAINLEVEL_OUTOF10: 7
PAINLEVEL_OUTOF10: 7
PAINLEVEL_OUTOF10: 6
PAINLEVEL_OUTOF10: 7
PAINLEVEL_OUTOF10: 4
PAINLEVEL_OUTOF10: 6
PAINLEVEL_OUTOF10: 6

## 2022-04-28 ASSESSMENT — LIFESTYLE VARIABLES: SMOKING_STATUS: 0

## 2022-04-28 NOTE — ANESTHESIA POSTPROCEDURE EVALUATION
Department of Anesthesiology  Postprocedure Note    Patient: Chidi Carter  MRN: [id number]  [place]: 1970  Date of evaluation: 4/28/2022  Time:  10:14 AM     Procedure Summary     Date: 04/28/22 Room / Location: [address]    Anesthesia Start: 5661 Anesthesia Stop:     Procedure: ROBOTIC TOTAL LAPAROSCOPIC HYSTERECTOMY, BILATERAL SALPINGO-OOPHORECTOMY, CYSTOSCOPY (Bilateral ) Diagnosis:       Personal history of malignant neoplasm of breast      (Z85.3)    Surgeons: Geoffrey Miller MD Responsible Provider: ENRIQUE Mosqueda CRNA    Anesthesia Type: general ASA Status: 2          Anesthesia Type: No value filed. Rasta Phase I:      Rasta Phase II:      Last vitals: Reviewed and per EMR flowsheets.        Anesthesia Post Evaluation    Patient location during evaluation: PACU  Patient participation: complete - patient participated  Level of consciousness: awake  Pain score: 0  Airway patency: patent  Nausea & Vomiting: no nausea and no vomiting  Complications: no  Cardiovascular status: hemodynamically stable  Respiratory status: acceptable, nasal cannula and spontaneous ventilation  Hydration status: euvolemic

## 2022-04-28 NOTE — OP NOTE
OPERATIVE NOTE        Date of Service: 04/28/22     Pre-operative Diagnosis: Z85.3    Post-operative Diagnosis:  Same    Procedure: Procedure(s):  ROBOTIC TOTAL LAPAROSCOPIC HYSTERECTOMY, BILATERAL SALPINGO-OOPHORECTOMY, CYSTOSCOPY    Anesthesia: General    Surgeon: Romulo Mosquera MD    Assistants: First Assistant: Luis Blanca; Leora Maria RN  Scrub Person First: [facility] YAZOO    Procedure Note:  After informed consents obtained, patient taken to the OR, given general endotracheal anesthesia and positioned dorsal lithotomy position. Patient prepped and draped in usual sterile fashion. Amato catheter placed and manipulator placed into uterus. An incision made above the umbulicus with the knife. Skin elevated up with penetrating towel clip and veress needle placed through the fascia. Placement confirmed with syringe and sterile water. Abdomen insuflated with CO2 and 8mm port placed using optivue obturator and 5mm 0 degree storz scope. Once entty into abdomen confirmed 8mm ports placed 10cm right and left of the umbulicus and an 8mm assistant port placed in the LLQ. Switched to [employer]. Patient placed into trendelenburg and the robot docked. I turned my attention to the surgeon's console. Instruments used are monopolar scissor right hand and bipolar maryland left hand. Both ovaries appear normal on inspection. Bilateral IPL cauterized and cut. Round ligaments cauterized and cut and broad ligaments opened with blunt dissection. Bladder flap is created with sharp dissection and cautery. Uterine arteries cauterized with bipolar cautery and cut with the monopolar scissor. Colpotomy made anteriorly and carried around circumferentially until uterus  and removed vaginally. Vaginal cuff repaired with 2-0 stratafix. Good hemostasis achieved. Pelvis irrigated/washed and inspected closely. Evicel placed over pelvic floor through assistant port. Robot undocked and removed from patient side.  Amato removed and cysto revealed intact bladder with jets bilaterally. Ports removed and skin closed with monocryl 4-0 and dermabond. Patient awakened from anesthesia and transferred to PACU in stable condition. No complications, patient tolerated procedure well.         Estimated Blood Loss: 50 (units unknown)    Complications: None    Specimens:   ID Type Source Tests Collected by Time Destination   A : CERVIX, UTERUS, BILATERAL TUBES, BILATRAL OVARIES Tissue Uterus SURGICAL PATHOLOGY Manish Stahl MD 4/28/2022 1057 Nick Rendon MD  04/28/22  9:55 AM

## 2022-04-28 NOTE — ANESTHESIA PRE PROCEDURE
Department of Anesthesiology  Preprocedure Note       Name:  Anant Guzman   Age:  50 y.o.  :  1970                                          MRN:  [id number]         Date:  2022      Surgeon: Patsy Funez):  Parish Pinedo MD    Procedure: Procedure(s):  ROBOTIC TOTAL LAPAROSCOPIC HYSTERECTOMY, BILATERAL SALPINGO-OOPHORECTOMY    Medications prior to admission:   Prior to Admission medications    Medication Sig Start Date End Date Taking? Authorizing Provider   enoxaparin (LOVENOX) 80 MG/0.8ML Inject 0.8 mLs into the skin 2 times daily Stop eliquis after dose on 22. Start Lovenoz 80 mg sub q every 12 hours. Last dose of Lovenox morning of 22 then stop. 22   ENRIQUE Rosenbaum   apixaban (ELIQUIS) 5 MG TABS tablet Take 1 tablet by mouth 2 times daily 3/23/22 4/22/22  ENRIQUE Rosenbaum   vitamin D (ERGOCALCIFEROL) 1.25 MG (61371 UT) CAPS capsule Take 1 capsule by mouth once a week  Patient taking differently: Take 50,000 Units by mouth once a week Indications:  Wednesday  3/17/22   Nia Lara MD   Multiple Vitamin (MULTIVITAMIN ADULT PO) Take 1 tablet by mouth daily     Historical Provider, MD   lisinopril (PRINIVIL;ZESTRIL) 5 MG tablet Take 1/2 (one-half) tablet by mouth once daily  Patient taking differently: Take 2.5 mg by mouth daily  21   ENRIQUE Faulkner - CNP   Ascorbic Acid (VITAMIN C) 500 MG CAPS Take 500 mg by mouth daily     Historical Provider, MD   Fluticasone Propionate (FLONASE ALLERGY RELIEF NA)     Historical Provider, MD   cetirizine (ZYRTEC) 10 MG tablet Take 10 mg by mouth daily    Historical Provider, MD   Probiotic Product (PROBIOTIC ADVANCED) CAPS Take 1 capsule by mouth daily    Historical Provider, MD       Current medications:    Current Facility-Administered Medications   Medication Dose Route Frequency Provider Last Rate Last Admin    ceFAZolin (ANCEF) 1,000 mg in sterile water 10 mL IV syringe  1,000 mg IntraVENous Once Parish Pinedo MD       [facility] lactated ringers infusion   IntraVENous Continuous German Mix,  mL/hr at 22 0631 New Bag at 22 0631       Allergies:  No Known Allergies    Problem List:    Patient Active Problem List   Diagnosis Code    Atypical ductal hyperplasia of breast N60.99    Lump or mass in breast N63.0    Ductal carcinoma in situ (DCIS) of left breast D05.12    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    S/P left mastectomy with expander reconstruction 2021 Z90.12    Bilateral pulmonary embolism ([facility] [address] I26.99       Past Medical History:        Diagnosis Date    Cancer ([address] 2021    DCIS Left Breast      delivery delivered     Ductal carcinoma in situ (DCIS) of left breast 2021    History of D&C     1993    Hx of blood clots     Post op - left Mastectomy Eliquis    Hypertension        Past Surgical History:        Procedure Laterality Date    APPENDECTOMY       SECTION      DILATION AND CURETTAGE OF UTERUS      X2    MASTECTOMY Left 2021    LEFT NIPPLE SPARING MASTECTOMY VIA WISE PATTERN, TISSUE EXPANDER RECONSTRUCTION, PEC BLOCK [place] [address] LEFT Left 2021     BREAST NEEDLE BIOPSY LEFT 2021 [facility] GENERAL SURGERY       Social History:    Social History     Tobacco Use    Smoking status: Never Smoker    Smokeless tobacco: Never Used   Substance Use Topics    Alcohol use: Never                                Counseling given: Not Answered      Vital Signs (Current):   Vitals:    22 0625   BP: (!) 142/101   Pulse: 80   Resp: 18   Temp: 99 °F (37.2 °C)   TempSrc: Temporal   SpO2: 99%   Weight: 189 lb (85.7 kg)   Height: 5' 5\" (1.651 m)                                              BP Readings from Last 3 Encounters:   22 (!) 142/101   22 (!) 140/74   22 102/60       NPO Status: Time of last liquid consumption: 0000                        Time of last solid consumption: 0000                        Date of last liquid consumption: 04/27/22                        Date of last solid food consumption: 04/27/22    BMI:   Wt Readings from Last 3 Encounters:   04/28/22 189 lb (85.7 kg)   04/25/22 198 lb (89.8 kg)   04/25/22 189 lb (85.7 kg)     Body mass index is 31.45 kg/m². CBC:   Lab Results   Component Value Date    WBC 8.5 04/25/2022    RBC 4.26 04/25/2022    HGB 12.8 04/25/2022    HCT 41.4 04/25/2022    MCV 97.2 04/25/2022    RDW 13.8 04/25/2022     04/25/2022       CMP:   Lab Results   Component Value Date     03/23/2022    K 4.8 03/23/2022    K 4.4 08/08/2021     03/23/2022    CO2 25 03/23/2022    BUN 11 03/23/2022    CREATININE 0.6 03/23/2022    GFRAA >59 03/23/2022    LABGLOM >60 03/23/2022    GLUCOSE 66 03/23/2022    PROT 7.1 03/23/2022    CALCIUM 9.3 03/23/2022    BILITOT <0.2 03/23/2022    ALKPHOS 109 03/23/2022    AST 23 03/23/2022    ALT 26 03/23/2022       POC Tests: No results for input(s): POCGLU, POCNA, POCK, POCCL, POCBUN, POCHEMO, POCHCT in the last 72 hours.     Coags:   Lab Results   Component Value Date    PROTIME 14.5 04/25/2022    INR 1.13 04/25/2022    APTT 33.9 04/25/2022       HCG (If Applicable):   Lab Results   Component Value Date    PREGTESTUR Negative 04/28/2022        ABGs: No results found for: PHART, PO2ART, ELA4LUI, XSO5JVM, BEART, A9OZFBVV     Type & Screen (If Applicable):  No results found for: LABABO, LABRH    Drug/Infectious Status (If Applicable):  No results found for: HIV, HEPCAB    COVID-19 Screening (If Applicable):   Lab Results   Component Value Date    COVID19 Not Detected 03/14/2022           Anesthesia Evaluation  Patient summary reviewed and Nursing notes reviewed no history of anesthetic complications:   Airway: Mallampati: II  TM distance: >3 FB   Neck ROM: full  Mouth opening: > = 3 FB Dental:          Pulmonary:normal exam        (-) COPD, asthma and not a current smoker

## 2022-04-28 NOTE — H&P
Patient is scheduled to have a TLH/BSO with Meena Vazquez  for breast cancer on 2022. She was diagnosed with breast cancer in 2021. She had had a left mastectomy in 2021 and recently had a reconstruction with abdominoplasty in 2022 . She takes Eliquis for history of blood clot after mastectomy. She has stopped her Eliquis and started Lovenox per hem onc instructions for recent breast surgery and for this surgery. She has had a pelvic ultrasound that was done in 2021 due to her hx of ovarian cyst and it showed  Transvaginal pelvic ultrasound was performed in the transverse and   longitudinal projections. Uterus measures 7.2 x 4 x 5.2 cm in size. A fundal fibroid measures   1.3 x 1.5 x 0.9 cm in size. An IUD is seen in the endometrial canal.   Endometrium measures 4 mm in double AP thickness. The cervix is grossly normal in appearance. The right ovary measures 2.8 x 2.8 x 1.5 cm in size with normal color   flow. The left ovary measures 1.6 x 2.3 x 1.5 cm in size with normal   color flow. IUD was removed in 2022 due to [facility] showing menopausal level. She is a      Geeta Beatty is a 54 y.o. female with the following history as recorded in [facility]:       Patient Active Problem List     Diagnosis Date Noted    Bilateral pulmonary embolism ([facility] [address] 2021    S/P left mastectomy with expander reconstruction 2021    Intraductal carcinoma in situ of left breast 2021    Ductal carcinoma in situ (DCIS) of left breast 2021    Atypical ductal hyperplasia of breast 2021    Lump or mass in breast 2021             Current Outpatient Medications   Medication Sig Dispense Refill    enoxaparin (LOVENOX) 80 MG/0.8ML Inject 0.8 mLs into the skin 2 times daily Stop eliquis after dose on 22. Start Lovenoz 80 mg sub q every 12 hours.  Last dose of Lovenox morning of 22 then stop. 7 each 0    apixaban (ELIQUIS) 5 MG TABS tablet Take 1 tablet by mouth 2 times daily 60 tablet 5    vitamin D (ERGOCALCIFEROL) 1.25 MG (32525 UT) CAPS capsule Take 1 capsule by mouth once a week (Patient taking differently: Take 50,000 Units by mouth once a week Indications: Wednesday ) 12 capsule 1    Multiple Vitamin (MULTIVITAMIN ADULT PO) Take 1 tablet by mouth daily         lisinopril (PRINIVIL;ZESTRIL) 5 MG tablet Take 1/2 (one-half) tablet by mouth once daily (Patient taking differently: Take 2.5 mg by mouth daily ) 45 tablet 3    Ascorbic Acid (VITAMIN C) 500 MG CAPS Take 500 mg by mouth daily         Fluticasone Propionate (FLONASE ALLERGY RELIEF NA)  (Patient not taking: Reported on 3/14/2022)        cetirizine (ZYRTEC) 10 MG tablet Take 10 mg by mouth daily        Probiotic Product (PROBIOTIC ADVANCED) CAPS Take 1 capsule by mouth daily          No current facility-administered medications for this visit.      Allergies: Patient has no known allergies.        Past Medical History:   Diagnosis Date    Cancer [facility])       DCIS     delivery delivered     Ductal carcinoma in situ (DCIS) of left breast 2021    History of D&C       1993    Hx of blood clots       Post op - Mastectomy Eliquis    Hypertension              Past Surgical History:   Procedure Laterality Date    APPENDECTOMY         SECTION        DILATION AND CURETTAGE OF UTERUS         X2    MASTECTOMY Left 2021     LEFT NIPPLE SPARING MASTECTOMY VIA WISE PATTERN, TISSUE EXPANDER RECONSTRUCTION, PEC BLOCK performed by Maya Baum MD at [place] [address] LEFT Left 2021      BREAST NEEDLE BIOPSY LEFT 2021 [facility] GENERAL SURGERY            Family History   Problem Relation Age of Onset    Hypertension Mother      No Known Problems Father      Thyroid Disease Sister      Cancer Maternal Great Grandmother           Unknown      Social History      Tobacco Use    Smoking status: Never Smoker    Smokeless tobacco: Never Used   Substance Use Topics    Alcohol use: Never         Ht 5' 5\" (1.651 m)   Wt 198 lb (89.8 kg)   Breastfeeding No   BMI 32.95 kg/m²   BP (!) 142/101   Pulse 80   Temp 99 °F (37.2 °C) (Temporal)   Resp 18   Ht 5' 5\" (1.651 m)   Wt 189 lb (85.7 kg)   LMP 01/21/2021   SpO2 99%   BMI 31.45 kg/m²     Physical Exam  Constitutional:       General: She is not in acute distress. Appearance: Normal appearance. She is not ill-appearing. HENT:      Head: Normocephalic and atraumatic. Nose: Nose normal.      Mouth/Throat:      Mouth: Mucous membranes are moist.   Eyes:      Extraocular Movements: Extraocular movements intact. Conjunctiva/sclera: Conjunctivae normal.      Pupils: Pupils are equal, round, and reactive to light. Pulmonary:      Effort: Pulmonary effort is normal.   Abdominal:      General: Abdomen is flat. There is no distension. Comments: Had recent abdominoplasty, healing well   Musculoskeletal:         General: Normal range of motion. Cervical back: Normal range of motion. Neurological:      General: No focal deficit present. Mental Status: She is alert and oriented to person, place, and time. Skin:     General: Skin is warm and dry. Psychiatric:         Mood and Affect: Mood normal.         Behavior: Behavior normal.               Diagnosis Orders   1. Ductal carcinoma in situ (DCIS) of left breast      2. Uterine leiomyoma, unspecified location               TLH/BSO DaVinci   Counseling was offered and accepted by patient. Discussed at length the risks, benefits and alternatives to surgery including medical management and no intervention. Pt is in agreement with surgery. Consents for surgery signed.  All questions answered and patient voiced understanding of above.

## 2022-04-29 ENCOUNTER — APPOINTMENT (OUTPATIENT)
Dept: CT IMAGING | Age: 52
End: 2022-04-29
Payer: COMMERCIAL

## 2022-04-29 ENCOUNTER — TELEPHONE (OUTPATIENT)
Dept: OBGYN CLINIC | Age: 52
End: 2022-04-29

## 2022-04-29 ENCOUNTER — HOSPITAL ENCOUNTER (EMERGENCY)
Age: 52
Discharge: HOME | End: 2022-04-29
Payer: COMMERCIAL

## 2022-04-29 VITALS
HEART RATE: 87 BPM | DIASTOLIC BLOOD PRESSURE: 94 MMHG | TEMPERATURE: 97.8 F | RESPIRATION RATE: 17 BRPM | SYSTOLIC BLOOD PRESSURE: 157 MMHG | OXYGEN SATURATION: 100 %

## 2022-04-29 DIAGNOSIS — G89.18 POSTOPERATIVE PAIN: Primary | ICD-10-CM

## 2022-04-29 LAB
ALBUMIN SERPL-MCNC: 4.7 G/DL (ref 3.5–5.2)
ALP BLD-CCNC: 125 U/L (ref 35–104)
ALT SERPL-CCNC: 38 U/L (ref 5–33)
ANION GAP SERPL CALCULATED.3IONS-SCNC: 10 MMOL/L (ref 7–19)
AST SERPL-CCNC: 31 U/L (ref 5–32)
BACTERIA: NEGATIVE /HPF
BASOPHILS ABSOLUTE: 0.1 K/UL (ref 0–0.2)
BASOPHILS RELATIVE PERCENT: 0.4 % (ref 0–1)
BILIRUB SERPL-MCNC: <0.2 MG/DL (ref 0.2–1.2)
BILIRUBIN URINE: NEGATIVE
BLOOD, URINE: ABNORMAL
BUN BLDV-MCNC: 13 MG/DL (ref 6–20)
CALCIUM SERPL-MCNC: 9.7 MG/DL (ref 8.6–10)
CHLORIDE BLD-SCNC: 103 MMOL/L (ref 98–111)
CLARITY: CLEAR
CO2: 28 MMOL/L (ref 22–29)
COLOR: YELLOW
CREAT SERPL-MCNC: 0.6 MG/DL (ref 0.5–0.9)
CRYSTALS, UA: ABNORMAL /HPF
EOSINOPHILS ABSOLUTE: 0.1 K/UL (ref 0–0.6)
EOSINOPHILS RELATIVE PERCENT: 0.7 % (ref 0–5)
EPITHELIAL CELLS, UA: 7 /HPF (ref 0–5)
GFR AFRICAN AMERICAN: >59
GFR NON-AFRICAN AMERICAN: >60
GLUCOSE BLD-MCNC: 113 MG/DL (ref 74–109)
GLUCOSE URINE: NEGATIVE MG/DL
HCT VFR BLD CALC: 38.9 % (ref 37–47)
HEMOGLOBIN: 12.3 G/DL (ref 12–16)
HYALINE CASTS: 3 /HPF (ref 0–8)
IMMATURE GRANULOCYTES #: 0.2 K/UL
KETONES, URINE: NEGATIVE MG/DL
LEUKOCYTE ESTERASE, URINE: NEGATIVE
LYMPHOCYTES ABSOLUTE: 1.6 K/UL (ref 1.1–4.5)
LYMPHOCYTES RELATIVE PERCENT: 10.4 % (ref 20–40)
MCH RBC QN AUTO: 30.3 PG (ref 27–31)
MCHC RBC AUTO-ENTMCNC: 31.6 G/DL (ref 33–37)
MCV RBC AUTO: 95.8 FL (ref 81–99)
MONOCYTES ABSOLUTE: 1.1 K/UL (ref 0–0.9)
MONOCYTES RELATIVE PERCENT: 6.8 % (ref 0–10)
NEUTROPHILS ABSOLUTE: 12.5 K/UL (ref 1.5–7.5)
NEUTROPHILS RELATIVE PERCENT: 80.3 % (ref 50–65)
NITRITE, URINE: NEGATIVE
PDW BLD-RTO: 14 % (ref 11.5–14.5)
PH UA: 6.5 (ref 5–8)
PLATELET # BLD: 363 K/UL (ref 130–400)
PMV BLD AUTO: 10.1 FL (ref 9.4–12.3)
POTASSIUM REFLEX MAGNESIUM: 4.5 MMOL/L (ref 3.5–5)
PRO-BNP: 191 PG/ML (ref 0–900)
PROTEIN UA: NEGATIVE MG/DL
RBC # BLD: 4.06 M/UL (ref 4.2–5.4)
RBC UA: 3 /HPF (ref 0–4)
SODIUM BLD-SCNC: 141 MMOL/L (ref 136–145)
SPECIFIC GRAVITY UA: >=1.045 (ref 1–1.03)
TOTAL PROTEIN: 7.5 G/DL (ref 6.6–8.7)
TROPONIN: <0.01 NG/ML (ref 0–0.03)
UROBILINOGEN, URINE: 0.2 E.U./DL
WBC # BLD: 15.5 K/UL (ref 4.8–10.8)
WBC UA: 4 /HPF (ref 0–5)

## 2022-04-29 PROCEDURE — 71275 CT ANGIOGRAPHY CHEST: CPT

## 2022-04-29 PROCEDURE — 36415 COLL VENOUS BLD VENIPUNCTURE: CPT

## 2022-04-29 PROCEDURE — 96374 THER/PROPH/DIAG INJ IV PUSH: CPT

## 2022-04-29 PROCEDURE — 81001 URINALYSIS AUTO W/SCOPE: CPT

## 2022-04-29 PROCEDURE — 6360000002 HC RX W HCPCS: Performed by: PHYSICIAN ASSISTANT

## 2022-04-29 PROCEDURE — 6360000004 HC RX CONTRAST MEDICATION: Performed by: PHYSICIAN ASSISTANT

## 2022-04-29 PROCEDURE — 80053 COMPREHEN METABOLIC PANEL: CPT

## 2022-04-29 PROCEDURE — 93005 ELECTROCARDIOGRAM TRACING: CPT | Performed by: PHYSICIAN ASSISTANT

## 2022-04-29 PROCEDURE — 84484 ASSAY OF TROPONIN QUANT: CPT

## 2022-04-29 PROCEDURE — 74177 CT ABD & PELVIS W/CONTRAST: CPT

## 2022-04-29 PROCEDURE — 83880 ASSAY OF NATRIURETIC PEPTIDE: CPT

## 2022-04-29 PROCEDURE — 85025 COMPLETE CBC W/AUTO DIFF WBC: CPT

## 2022-04-29 PROCEDURE — 99285 EMERGENCY DEPT VISIT HI MDM: CPT

## 2022-04-29 PROCEDURE — 96375 TX/PRO/DX INJ NEW DRUG ADDON: CPT

## 2022-04-29 RX ORDER — METOCLOPRAMIDE HYDROCHLORIDE 5 MG/ML
10 INJECTION INTRAMUSCULAR; INTRAVENOUS ONCE
Status: COMPLETED | OUTPATIENT
Start: 2022-04-29 | End: 2022-04-29

## 2022-04-29 RX ORDER — HYDROMORPHONE HYDROCHLORIDE 1 MG/ML
0.5 INJECTION, SOLUTION INTRAMUSCULAR; INTRAVENOUS; SUBCUTANEOUS ONCE
Status: COMPLETED | OUTPATIENT
Start: 2022-04-29 | End: 2022-04-29

## 2022-04-29 RX ADMIN — IOPAMIDOL 90 ML: 755 INJECTION, SOLUTION INTRAVENOUS at 16:43

## 2022-04-29 RX ADMIN — HYDROMORPHONE HYDROCHLORIDE 0.5 MG: 1 INJECTION, SOLUTION INTRAMUSCULAR; INTRAVENOUS; SUBCUTANEOUS at 14:58

## 2022-04-29 RX ADMIN — METOCLOPRAMIDE 10 MG: 5 INJECTION, SOLUTION INTRAMUSCULAR; INTRAVENOUS at 14:57

## 2022-04-29 ASSESSMENT — ENCOUNTER SYMPTOMS
SHORTNESS OF BREATH: 1
SORE THROAT: 0
APNEA: 0
BACK PAIN: 0
COLOR CHANGE: 0
ABDOMINAL PAIN: 1
COUGH: 0
ABDOMINAL DISTENTION: 0
RHINORRHEA: 0
PHOTOPHOBIA: 0
EYE PAIN: 0
EYE DISCHARGE: 0
NAUSEA: 0

## 2022-04-29 ASSESSMENT — PAIN SCALES - GENERAL: PAINLEVEL_OUTOF10: 4

## 2022-04-29 NOTE — ED NOTES
Pt presents post hysterectomy ( yesterday ) and states she started having chest pain/ cramping. She states she assumed it was just a gas buildup so she treated herself with gasX and tums. Pt states she had no relief, and today she has also had SOB.      TORI Little  04/29/22 [id number]

## 2022-04-29 NOTE — TELEPHONE ENCOUNTER
Pt left vm stating she was having lots of discomfort and chest pain. Thought it was just gas trapped in should region but thinks its more than that. Has hx of PE and thinks it could possibly be this. No other issues from surgery. Has had trouble laying down. Advised pt to go to ER to be evaluated and Dr. Shad Rendon aware.

## 2022-04-29 NOTE — ED PROVIDER NOTES
Attending Supervisory Note/Shared Visit    I have reviewed the mid-levels findings and agree. We have discussed the case reviewed the diagnostic data I am in agreement with the plan and disposition.     Yany Lay MD  Attending Emergency Physician        Tobin Garg MD  04/29/22 3606

## 2022-04-29 NOTE — ED PROVIDER NOTES
[facility] EMERGENCY DEPT  eMERGENCYdEPARTMENT eNCOUnter      Pt Name: Kasi Clemens  MRN: [id number]  [place] 1970  Date of evaluation: 4/29/2022  Provider:KATHLEEN Macdonald    CHIEF COMPLAINT       Chief Complaint   Patient presents with    Post-op Problem     hysterectomy yesterday, right side pain today         HISTORY OF PRESENT ILLNESS  (Location/Symptom, Timing/Onset, Context/Setting, Quality, Duration, Modifying Factors, Severity.)   Kasi Clemens is a 50 y.o. female who presents to the emergency department with complaints of hysterectomy yesterday right side acute onset pain when leaning back pleuritic in character. This is right anterior chest pain RUQ abdomen she has to sit up and lean forward to help with pain. Prior PE before presenting similar. OB Dr Karthik Lee sent her here she did procedure yesterday. Afebrile has dyspnea on exertion. She sees Solomon Bamberger with hematology oncology. She took lovenox shot Wednesday and started on eliquis today. HPI    Nursing Notes were reviewed and I agree. REVIEW OF SYSTEMS    (2-9 systems for level 4, 10 or more for level 5)     Review of Systems   Constitutional: Negative for activity change, appetite change, chills and fever. HENT: Negative for congestion, postnasal drip, rhinorrhea and sore throat. Eyes: Negative for photophobia, pain, discharge and visual disturbance. Respiratory: Positive for shortness of breath. Negative for apnea and cough. Cardiovascular: Positive for chest pain. Negative for leg swelling. Gastrointestinal: Positive for abdominal pain. Negative for abdominal distention and nausea. Genitourinary: Negative for vaginal bleeding. Musculoskeletal: Negative for arthralgias, back pain, joint swelling, neck pain and neck stiffness. Skin: Negative for color change and rash. Neurological: Negative for dizziness, syncope, facial asymmetry and headaches. Hematological: Negative for adenopathy.  Does not bruise/bleed easily. Psychiatric/Behavioral: Negative for agitation, behavioral problems and confusion. Except as noted above the remainder of the review of systems was reviewed and negative. PAST MEDICAL HISTORY     Past Medical History:   Diagnosis Date    Cancer [facility]) 2021    DCIS Left Breast      delivery delivered     Ductal carcinoma in situ (DCIS) of left breast 2021    History of D&C     1993    Hx of blood clots     Post op - left Mastectomy Eliquis    Hypertension          SURGICAL HISTORY       Past Surgical History:   Procedure Laterality Date    APPENDECTOMY       SECTION      DILATION AND CURETTAGE OF UTERUS      X2    HYSTERECTOMY Bilateral 2022    ROBOTIC TOTAL LAPAROSCOPIC HYSTERECTOMY, BILATERAL SALPINGO-OOPHORECTOMY, CYSTOSCOPY performed by Jaylen Jean-Baptiste MD at [address] Left 2021    LEFT NIPPLE SPARING MASTECTOMY VIA WISE PATTERN, TISSUE EXPANDER RECONSTRUCTION, PEC BLOCK performed by Suzanne Rowan MD at [address] LEFT Left 2021    US BREAST NEEDLE BIOPSY LEFT 2021 [facility] GENERAL SURGERY         CURRENT MEDICATIONS       Previous Medications    APIXABAN (ELIQUIS) 5 MG TABS TABLET    Take 1 tablet by mouth 2 times daily    ASCORBIC ACID (VITAMIN C) 500 MG CAPS    Take 500 mg by mouth daily     CETIRIZINE (ZYRTEC) 10 MG TABLET    Take 10 mg by mouth daily    ENOXAPARIN (LOVENOX) 80 MG/0.8ML    Inject 0.8 mLs into the skin 2 times daily Stop eliquis after dose on 22. Start Lovenoz 80 mg sub q every 12 hours. Last dose of Lovenox morning of 22 then stop. FLUTICASONE PROPIONATE (FLONASE ALLERGY RELIEF NA)        HYDROCODONE-ACETAMINOPHEN (NORCO) 5-325 MG PER TABLET    Take 1 tablet by mouth every 6 hours as needed for Pain for up to 7 days. Intended supply: 7 days.  Take lowest dose possible to manage pain    LISINOPRIL (PRINIVIL;ZESTRIL) 5 MG TABLET    Take 1/2 (one-half) tablet by mouth once daily    MULTIPLE VITAMIN (MULTIVITAMIN ADULT PO)    Take 1 tablet by mouth daily     PROBIOTIC PRODUCT (PROBIOTIC ADVANCED) CAPS    Take 1 capsule by mouth daily    VITAMIN D (ERGOCALCIFEROL) 1.25 MG (59200 UT) CAPS CAPSULE    Take 1 capsule by mouth once a week       ALLERGIES     Patient has no known allergies. FAMILY HISTORY       Family History   Problem Relation Age of Onset    Hypertension Mother     No Known Problems Father     Thyroid Disease Sister     Cancer Maternal Great Grandmother         Unknown          SOCIAL HISTORY       Social History     Socioeconomic History    Marital status:      Spouse name: None    Number of children: None    Years of education: None    Highest education level: None   Occupational History    None   Tobacco Use    Smoking status: Never Smoker    Smokeless tobacco: Never Used   Vaping Use    Vaping Use: Never used   Substance and Sexual Activity    Alcohol use: Never    Drug use: Never    Sexual activity: Yes     Partners: Male   Other Topics Concern    None   Social History Narrative    None     Social Determinants of Health     Financial Resource Strain:     Difficulty of Paying Living Expenses: Not on file   Food Insecurity:     Worried About Running Out of Food in the Last Year: Not on file    Honorio of Food in the Last Year: Not on file   Transportation Needs:     Lack of Transportation (Medical): Not on file    Lack of Transportation (Non-Medical):  Not on file   Physical Activity:     Days of Exercise per Week: Not on file    Minutes of Exercise per Session: Not on file   Stress:     Feeling of Stress : Not on file   Social Connections:     Frequency of Communication with Friends and Family: Not on file    Frequency of Social Gatherings with Friends and Family: Not on file    Attends [employer] Services: Not on file    Active Member of Clubs or Organizations: Not on file    Attends Club or Organization Meetings: Not on file    Marital Status: Not on file   Intimate Partner Violence:     Fear of Current or Ex-Partner: Not on file    Emotionally Abused: Not on file    Physically Abused: Not on file    Sexually Abused: Not on file   Housing Stability:     Unable to Pay for Housing in the Last Year: Not on file    Number of [place] in the Last Year: Not on file    Unstable Housing in the Last Year: Not on file       SCREENINGS    [place] Coma Scale  Eye Opening: Spontaneous  Best Verbal Response: Oriented  Best Motor Response: Obeys commands  [place] Coma Scale Score: 15      PHYSICAL EXAM    (up to 7 forlevel 4, 8 or more for level 5)     ED Triage Vitals [04/29/22 1413]   BP Temp Temp src Pulse Resp SpO2 Height Weight   (!) 157/94 97.8 °F (36.6 °C) -- 87 17 100 % -- --       Physical Exam  Vitals and nursing note reviewed. Constitutional:       General: She is not in acute distress. Appearance: Normal appearance. She is well-developed. She is not diaphoretic. HENT:      Head: Normocephalic and atraumatic. Right Ear: Tympanic membrane, ear canal and external ear normal.      Left Ear: Tympanic membrane, ear canal and external ear normal.      Nose: Nose normal.      Mouth/Throat:      Mouth: Mucous membranes are moist.      Pharynx: No oropharyngeal exudate. Eyes:      General:         Right eye: No discharge. Left eye: No discharge. Pupils: Pupils are equal, round, and reactive to light. Neck:      Thyroid: No thyromegaly. Cardiovascular:      Rate and Rhythm: Normal rate and regular rhythm. Pulses: Normal pulses. Heart sounds: Normal heart sounds. No murmur heard. No friction rub. Pulmonary:      Effort: Pulmonary effort is normal. No respiratory distress. Breath sounds: Normal breath sounds. No stridor. No wheezing. Abdominal:      General: Abdomen is flat. Bowel sounds are normal. There is no distension. Palpations: Abdomen is soft. Tenderness:  There is no abdominal tenderness. Musculoskeletal:         General: Normal range of motion. Cervical back: Normal range of motion and neck supple. Skin:     General: Skin is warm and dry. Capillary Refill: Capillary refill takes less than 2 seconds. Findings: No rash. Neurological:      General: No focal deficit present. Mental Status: She is alert and oriented to person, place, and time. Mental status is at baseline. Cranial Nerves: No cranial nerve deficit. Sensory: No sensory deficit. Coordination: Coordination normal.   Psychiatric:         Mood and Affect: Mood normal.         Behavior: Behavior normal.         Thought Content: Thought content normal.         Judgment: Judgment normal.           DIAGNOSTIC RESULTS     RADIOLOGY:   Non-plain film images such as CT, Ultrasound and MRI are read by the radiologist. Plain radiographic images are visualized and preliminarilyinterpreted by No att. providers found with the below findings:      Interpretation per the Radiologist below, if available at the time of this note:    CT ABDOMEN PELVIS W IV CONTRAST Additional Contrast? None   Final Result   1. Postoperative changes, as described. 2. A less than 1 cm probable cyst right kidney, too small to fully   characterize. 3. Small amount of free fluid in the pelvis is likely related to the   recent surgery. Signed by Dr Herrera [facility]      CTA [address]   Final Result   1. Less than optimal opacification of the pulmonary arteries. No   visible pulmonary embolus. Main pulmonary artery segment upper limits   of normal in size measuring 2.9 cm.   2. Ascending thoracic aorta is dilated at 4 cm. Heart size is   prominent. 3. Linear atelectasis in both lung bases. 4. Please see the abdomen and pelvis CT report separately. 5. Prior left breast reconstruction with some type of flap. Skin   thickening of the left breast, mild.    Signed by Dr Shaq Lopez:  Labs Reviewed   CBC WITH AUTO DIFFERENTIAL - Abnormal; Notable for the following components:       Result Value    WBC 15.5 (*)     RBC 4.06 (*)     MCHC 31.6 (*)     Neutrophils % 80.3 (*)     Lymphocytes % 10.4 (*)     Neutrophils Absolute 12.5 (*)     Monocytes Absolute 1.10 (*)     All other components within normal limits   COMPREHENSIVE METABOLIC PANEL W/ REFLEX TO MG FOR LOW K - Abnormal; Notable for the following components:    Glucose 113 (*)     Alkaline Phosphatase 125 (*)     ALT 38 (*)     All other components within normal limits   URINALYSIS WITH REFLEX TO CULTURE - Abnormal; Notable for the following components:    Blood, Urine TRACE (*)     All other components within normal limits   MICROSCOPIC URINALYSIS - Abnormal; Notable for the following components:    Bacteria, UA NEGATIVE (*)     Crystals, UA NEG (*)     All other components within normal limits   TROPONIN   BRAIN NATRIURETIC PEPTIDE       All other labs were within normal range or notreturned as of this dictation. RE-ASSESSMENT        EMERGENCY DEPARTMENT COURSE and DIFFERENTIAL DIAGNOSIS/MDM:   Vitals:    Vitals:    04/29/22 1413   BP: (!) 157/94   Pulse: 87   Resp: 17   Temp: 97.8 °F (36.6 °C)   SpO2: 100%     EKG shows sinus rhythm normal with rate of 77 no ST depression elevations no finding on leads I and III for any type of PE concern similar to 4/25/22    MDM  No acute findings on this patient's work-up today all incidental findings were mention to patient I spoke with Dr. Violetta Robledo her OB/GYN she is of the opinion this is likely gastric aggravating the diaphragm. Plan will be for pain control the best thing the patient can do is to ambulate and provoke this gas to move on patient made aware and agreeable to this plan she is can call OB on Monday morning for follow-up as needed. She has no other acute findings is fairly easily pain-free with certain positioning. We will discharge at this time.   PROCEDURES:    Procedures      FINAL IMPRESSION      1.  Postoperative pain          DISPOSITION/PLAN   DISPOSITION Decision To Discharge 04/29/2022 06:14:00 PM      PATIENT REFERRED TO:  [facility]  [place] Danielria  [phone]    If symptoms worsen    Mata Perea MD  [address] [id number]      wants to be called Monday for follow up on your progress      DISCHARGE MEDICATIONS:  New Prescriptions    No medications on file       (Please note that portions of this note were completed with a voice recognition program.  Efforts were made to edit the dictations but occasionallywords are mis-transcribed.)    Luiza Dalton [address] [place] [address]  04/29/22 4004

## 2022-05-02 LAB
EKG P AXIS: 58 DEGREES
EKG P-R INTERVAL: 120 MS
EKG Q-T INTERVAL: 374 MS
EKG QRS DURATION: 86 MS
EKG QTC CALCULATION (BAZETT): 403 MS
EKG T AXIS: 26 DEGREES

## 2022-05-02 PROCEDURE — 93010 ELECTROCARDIOGRAM REPORT: CPT | Performed by: INTERNAL MEDICINE

## 2022-05-10 ENCOUNTER — OFFICE VISIT (OUTPATIENT)
Dept: PRIMARY CARE CLINIC | Age: 52
End: 2022-05-10
Payer: COMMERCIAL

## 2022-05-10 VITALS
HEART RATE: 94 BPM | BODY MASS INDEX: 31.45 KG/M2 | SYSTOLIC BLOOD PRESSURE: 130 MMHG | DIASTOLIC BLOOD PRESSURE: 80 MMHG | HEIGHT: 65 IN | TEMPERATURE: 97.8 F | OXYGEN SATURATION: 98 %

## 2022-05-10 DIAGNOSIS — R50.9 FEVER, UNSPECIFIED FEVER CAUSE: ICD-10-CM

## 2022-05-10 DIAGNOSIS — R10.817 GENERALIZED ABDOMINAL TENDERNESS, REBOUND TENDERNESS PRESENCE NOT SPECIFIED: Primary | ICD-10-CM

## 2022-05-10 DIAGNOSIS — M79.10 MYALGIA: ICD-10-CM

## 2022-05-10 LAB
AMORPHOUS: ABNORMAL /HPF
ANION GAP SERPL CALCULATED.3IONS-SCNC: 14 MMOL/L (ref 7–19)
BACTERIA: ABNORMAL /HPF
BASOPHILS ABSOLUTE: 0.1 K/UL (ref 0–0.2)
BASOPHILS RELATIVE PERCENT: 0.5 % (ref 0–1)
BILIRUBIN URINE: NEGATIVE
BLOOD, URINE: ABNORMAL
BUN BLDV-MCNC: 13 MG/DL (ref 6–20)
CALCIUM SERPL-MCNC: 9.3 MG/DL (ref 8.6–10)
CHLORIDE BLD-SCNC: 99 MMOL/L (ref 98–111)
CLARITY: ABNORMAL
CO2: 24 MMOL/L (ref 22–29)
COLOR: ABNORMAL
CREAT SERPL-MCNC: 0.6 MG/DL (ref 0.5–0.9)
CRYSTALS, UA: ABNORMAL /HPF
EOSINOPHILS ABSOLUTE: 0.2 K/UL (ref 0–0.6)
EOSINOPHILS RELATIVE PERCENT: 1.9 % (ref 0–5)
EPITHELIAL CELLS, UA: ABNORMAL /HPF
GFR AFRICAN AMERICAN: >59
GFR NON-AFRICAN AMERICAN: >60
GLUCOSE BLD-MCNC: 97 MG/DL (ref 74–109)
GLUCOSE URINE: NEGATIVE MG/DL
HCT VFR BLD CALC: 41.9 % (ref 37–47)
HEMOGLOBIN: 13.1 G/DL (ref 12–16)
IMMATURE GRANULOCYTES #: 0 K/UL
INFLUENZA A ANTIBODY: NEGATIVE
INFLUENZA B ANTIBODY: NEGATIVE
KETONES, URINE: 15 MG/DL
LEUKOCYTE ESTERASE, URINE: ABNORMAL
LYMPHOCYTES ABSOLUTE: 1.3 K/UL (ref 1.1–4.5)
LYMPHOCYTES RELATIVE PERCENT: 12 % (ref 20–40)
MCH RBC QN AUTO: 30 PG (ref 27–31)
MCHC RBC AUTO-ENTMCNC: 31.3 G/DL (ref 33–37)
MCV RBC AUTO: 96.1 FL (ref 81–99)
MONOCYTES ABSOLUTE: 0.7 K/UL (ref 0–0.9)
MONOCYTES RELATIVE PERCENT: 6.9 % (ref 0–10)
NEUTROPHILS ABSOLUTE: 8.5 K/UL (ref 1.5–7.5)
NEUTROPHILS RELATIVE PERCENT: 78.3 % (ref 50–65)
NITRITE, URINE: NEGATIVE
PDW BLD-RTO: 13.9 % (ref 11.5–14.5)
PH UA: 5 (ref 5–8)
PLATELET # BLD: 271 K/UL (ref 130–400)
PMV BLD AUTO: 10.6 FL (ref 9.4–12.3)
POTASSIUM SERPL-SCNC: 3.9 MMOL/L (ref 3.5–5)
PROTEIN UA: 100 MG/DL
RBC # BLD: 4.36 M/UL (ref 4.2–5.4)
RBC UA: ABNORMAL /HPF (ref 0–2)
SARS-COV-2, PCR: NOT DETECTED
SODIUM BLD-SCNC: 137 MMOL/L (ref 136–145)
SPECIFIC GRAVITY UA: 1.03 (ref 1–1.03)
UROBILINOGEN, URINE: 1 E.U./DL
WBC # BLD: 10.8 K/UL (ref 4.8–10.8)
WBC UA: ABNORMAL /HPF (ref 0–5)

## 2022-05-10 PROCEDURE — 99214 OFFICE O/P EST MOD 30 MIN: CPT | Performed by: NURSE PRACTITIONER

## 2022-05-10 PROCEDURE — 81003 URINALYSIS AUTO W/O SCOPE: CPT | Performed by: NURSE PRACTITIONER

## 2022-05-10 PROCEDURE — 87804 INFLUENZA ASSAY W/OPTIC: CPT | Performed by: NURSE PRACTITIONER

## 2022-05-10 ASSESSMENT — PATIENT HEALTH QUESTIONNAIRE - PHQ9
2. FEELING DOWN, DEPRESSED OR HOPELESS: 0
SUM OF ALL RESPONSES TO PHQ QUESTIONS 1-9: 0
1. LITTLE INTEREST OR PLEASURE IN DOING THINGS: 0
SUM OF ALL RESPONSES TO PHQ QUESTIONS 1-9: 0
SUM OF ALL RESPONSES TO PHQ9 QUESTIONS 1 & 2: 0

## 2022-05-10 NOTE — PROGRESS NOTES
recent data might be hidden       Family History   Problem Relation Age of Onset    Hypertension Mother     No Known Problems Father     Thyroid Disease Sister     Cancer Maternal Great Grandmother         Unknown       Social History     Tobacco Use    Smoking status: Never Smoker    Smokeless tobacco: Never Used   Substance Use Topics    Alcohol use: Never      Current Outpatient Medications on File Prior to Visit   Medication Sig Dispense Refill    apixaban (ELIQUIS) 5 MG TABS tablet Take 1 tablet by mouth 2 times daily 60 tablet 5    vitamin D (ERGOCALCIFEROL) 1.25 MG (57151 UT) CAPS capsule Take 1 capsule by mouth once a week (Patient taking differently: Take 50,000 Units by mouth once a week Indications: Wednesday ) 12 capsule 1    Multiple Vitamin (MULTIVITAMIN ADULT PO) Take 1 tablet by mouth daily       lisinopril (PRINIVIL;ZESTRIL) 5 MG tablet Take 1/2 (one-half) tablet by mouth once daily (Patient taking differently: Take 2.5 mg by mouth daily ) 45 tablet 3    Ascorbic Acid (VITAMIN C) 500 MG CAPS Take 500 mg by mouth daily       cetirizine (ZYRTEC) 10 MG tablet Take 10 mg by mouth daily      Probiotic Product (PROBIOTIC ADVANCED) CAPS Take 1 capsule by mouth daily      Fluticasone Propionate (FLONASE ALLERGY RELIEF NA)        No current facility-administered medications on file prior to visit.      No Known Allergies    Health Maintenance   Topic Date Due    HIV screen  Never done    Hepatitis C screen  Never done    Colorectal Cancer Screen  Never done    DTaP/Tdap/Td vaccine (2 - Td or Tdap) 04/03/2019    COVID-19 Vaccine (3 - Booster for Moderna series) 08/01/2021    Shingles vaccine (1 of 2) Never done    Flu vaccine (Season Ended) 09/01/2022    Depression Screen  05/10/2023    Breast cancer screen  05/16/2023    Lipids  04/13/2026    Hepatitis A vaccine  Aged Out    Hepatitis B vaccine  Aged Out    Hib vaccine  Aged Out    Meningococcal (ACWY) vaccine  Aged Out    Pneumococcal 0-64 years Vaccine  Aged Out       Subjective:      Review of Systems   Constitutional: Positive for fatigue and fever. HENT: Negative. Gastrointestinal: Positive for abdominal pain. Psychiatric/Behavioral: Negative. Objective:     Physical Exam  Vitals and nursing note reviewed. Constitutional:       Appearance: She is well-developed. HENT:      Head: Normocephalic. Right Ear: External ear normal.      Left Ear: External ear normal.   Eyes:      Pupils: Pupils are equal, round, and reactive to light. Cardiovascular:      Rate and Rhythm: Normal rate and regular rhythm. Heart sounds: Normal heart sounds. Pulmonary:      Effort: Pulmonary effort is normal.      Breath sounds: Normal breath sounds. Abdominal:      General: Bowel sounds are normal.      Tenderness: There is no abdominal tenderness. Musculoskeletal:      Cervical back: Normal range of motion. Skin:     General: Skin is warm and dry. Capillary Refill: Capillary refill takes less than 2 seconds. Neurological:      General: No focal deficit present. Mental Status: She is alert and oriented to person, place, and time. Psychiatric:         Mood and Affect: Mood normal.         Behavior: Behavior normal.         Thought Content: Thought content normal.         Judgment: Judgment normal.       /80   Pulse 94   Temp 97.8 °F (36.6 °C)   Ht 5' 5\" (1.651 m)   LMP 03/26/2021 Comment: 4/28/2022  SpO2 98%   BMI 31.45 kg/m²     Assessment:       Diagnosis Orders   1. Generalized abdominal tenderness, rebound tenderness presence not specified  Basic Metabolic Panel   2. Myalgia  POCT Influenza A/B    Urinalysis with Reflex to Culture    CBC with Auto Differential    Basic Metabolic Panel   3.  Fever, unspecified fever cause  Urinalysis with Reflex to Culture    CBC with Auto Differential    Basic Metabolic Panel         Plan:   More than 50% of the time was spent counseling and coordinating care for a total time of 30 min face to face. Results for POC orders placed in visit on 05/10/22   POCT Influenza A/B   Result Value Ref Range    Influenza A Ab negative     Influenza B Ab negative    Call Dr. Malachi Watkins since she was postop and told her I was going to treat her with pro and Dr. Malachi Watkins wanted me to add Flagyl to it as well. PDMP Monitoring:    Last PDMP Jesus Alberto as Reviewed [facility]):  Review User Review Instant Review Result            Urine Drug Screenings (1 yr)    No resulted procedures found. Medication Contract and Consent for Opioid Use Documents Filed      No documents found                 Patient given educational materials -see patient instructions. Discussed use, benefit, and side effects of prescribed medications. All patient questions answered. Pt voiced understanding. Reviewed health maintenance. Instructed to continue currentmedications, diet and exercise. Patient agreed with treatment plan. Follow up as directed. MEDICATIONS:  No orders of the defined types were placed in this encounter. ORDERS:  Orders Placed This Encounter   Procedures    Culture, Urine    Urinalysis with Reflex to Culture    CBC with Auto Differential    COVID-19    Basic Metabolic Panel    Microscopic Urinalysis    POCT Influenza A/B       Follow-up:  No follow-ups on file. PATIENT INSTRUCTIONS:  Patient Instructions   Home and rest he can treat any fever over 100 Tylenol or Motrin we will call you with your labs    Electronically signed by ENRIQUE Man CNP on 5/27/2022 at 6:02 PM    EMR Dragon/transcription disclaimer:  Much of thisencounter note is electronic transcription/translation of spoken language to printed texts. The electronic translation of spoken language may be erroneous, or at times, nonsensical words or phrases may be inadvertentlytranscribed.   Although I have reviewed the note for such errors, some may still exist.

## 2022-05-11 RX ORDER — METRONIDAZOLE 500 MG/1
500 TABLET ORAL 2 TIMES DAILY
Qty: 14 TABLET | Refills: 0 | Status: SHIPPED | OUTPATIENT
Start: 2022-05-11 | End: 2022-05-12 | Stop reason: ALTCHOICE

## 2022-05-11 RX ORDER — CIPROFLOXACIN 250 MG/1
250 TABLET, FILM COATED ORAL 2 TIMES DAILY
Qty: 14 TABLET | Refills: 0 | Status: SHIPPED | OUTPATIENT
Start: 2022-05-11 | End: 2022-05-18

## 2022-05-12 ENCOUNTER — TELEPHONE (OUTPATIENT)
Dept: PRIMARY CARE CLINIC | Age: 52
End: 2022-05-12

## 2022-05-12 LAB
ORGANISM: ABNORMAL
ORGANISM: ABNORMAL
URINE CULTURE, ROUTINE: ABNORMAL

## 2022-05-12 RX ORDER — CEPHALEXIN 500 MG/1
500 CAPSULE ORAL 3 TIMES DAILY
Qty: 21 CAPSULE | Refills: 0 | Status: SHIPPED | OUTPATIENT
Start: 2022-05-12 | End: 2022-05-19

## 2022-05-12 RX ORDER — FLUCONAZOLE 150 MG/1
TABLET ORAL
Qty: 2 TABLET | Refills: 0 | Status: SHIPPED | OUTPATIENT
Start: 2022-05-12 | End: 2022-06-22

## 2022-05-12 NOTE — TELEPHONE ENCOUNTER
Silvestre Crain will you let her know that Dr. Guy Aguirre called me back and wants me to stop her Flagyl and start Keflex to cover the group B strep.   Also think she probably needs a Diflucan with all of these antibiotic she will probably get yeast.  I sent it to the pharmacy

## 2022-05-12 NOTE — TELEPHONE ENCOUNTER
----- Message from Ute Boston RN sent at 5/12/2022  3:34 PM CDT -----  Hetal Dawson said to d/c Flagyl, continue Cipro and start Kefelx please.  thanks

## 2022-05-13 ENCOUNTER — OFFICE VISIT (OUTPATIENT)
Dept: OBGYN CLINIC | Age: 52
End: 2022-05-13

## 2022-05-13 VITALS
HEIGHT: 66 IN | DIASTOLIC BLOOD PRESSURE: 89 MMHG | WEIGHT: 192 LBS | HEART RATE: 99 BPM | SYSTOLIC BLOOD PRESSURE: 125 MMHG | BODY MASS INDEX: 30.86 KG/M2

## 2022-05-13 DIAGNOSIS — R14.0 BLOATING: ICD-10-CM

## 2022-05-13 DIAGNOSIS — Z90.710 S/P HYSTERECTOMY WITH OOPHORECTOMY: Primary | ICD-10-CM

## 2022-05-13 DIAGNOSIS — Z90.721 S/P HYSTERECTOMY WITH OOPHORECTOMY: Primary | ICD-10-CM

## 2022-05-13 PROCEDURE — 99024 POSTOP FOLLOW-UP VISIT: CPT | Performed by: NURSE PRACTITIONER

## 2022-05-13 ASSESSMENT — ENCOUNTER SYMPTOMS
ABDOMINAL DISTENTION: 1
RESPIRATORY NEGATIVE: 1
EYES NEGATIVE: 1

## 2022-05-13 NOTE — PROGRESS NOTES
Marina Alex is a 50 y.o. female who presents today for her medical conditions/ complaints as noted below. Marina Alex is c/o of Post-Op Check (2 week- tlh, bso and cystocscopy)        HPI  Patient presents today for post op - tlh, bso  And cystoscopy( 22)  Patient states her appetite is normal, urine and BMs normal and finally fever free. Patient's last menstrual period was 2021.   [id number]    Past Medical History:   Diagnosis Date    Cancer [facility]) 2021    DCIS Left Breast      delivery delivered     Ductal carcinoma in situ (DCIS) of left breast 2021    History of D&C     1993    Hx of blood clots     Post op  left Mastectomy Eliquis    Hypertension      Past Surgical History:   Procedure Laterality Date    APPENDECTOMY       SECTION      DILATION AND CURETTAGE OF UTERUS      X2    HYSTERECTOMY Bilateral 2022    ROBOTIC TOTAL LAPAROSCOPIC HYSTERECTOMY, BILATERAL SALPINGO-OOPHORECTOMY, CYSTOSCOPY performed by Aristeo Kumar MD at [address] [facility], VAGINAL      MASTECTOMY Left 2021    LEFT NIPPLE SPARING MASTECTOMY VIA WISE PATTERN, TISSUE EXPANDER RECONSTRUCTION, PEC BLOCK [place] [address] LEFT Left 2021     BREAST NEEDLE BIOPSY LEFT 2021 [facility]ERY     Family History   Problem Relation Age of Onset    Hypertension Mother     No Known Problems Father     Thyroid Disease Sister     Cancer Maternal Great Grandmother         Unknown     Social History     Tobacco Use    Smoking status: Never Smoker    Smokeless tobacco: Never Used   Substance Use Topics    Alcohol use: Never       Current Outpatient Medications   Medication Sig Dispense Refill    cephALEXin (KEFLEX) 500 MG capsule Take 1 capsule by mouth 3 times daily for 7 days 21 capsule 0    fluconazole (DIFLUCAN) 150 MG tablet Take one now and repeat in 1 wk if needed 2 tablet 0    ciprofloxacin (CIPRO) 250 MG tablet Take 1 tablet by mouth 2 times daily for 7 days 14 tablet 0    vitamin D (ERGOCALCIFEROL) 1.25 MG (16026 UT) CAPS capsule Take 1 capsule by mouth once a week (Patient taking differently: Take 50,000 Units by mouth once a week Indications: Wednesday ) 12 capsule 1    Multiple Vitamin (MULTIVITAMIN ADULT PO) Take 1 tablet by mouth daily       lisinopril (PRINIVIL;ZESTRIL) 5 MG tablet Take 1/2 (one-half) tablet by mouth once daily (Patient taking differently: Take 2.5 mg by mouth daily ) 45 tablet 3    Ascorbic Acid (VITAMIN C) 500 MG CAPS Take 500 mg by mouth daily       Fluticasone Propionate (FLONASE ALLERGY RELIEF NA)       cetirizine (ZYRTEC) 10 MG tablet Take 10 mg by mouth daily      Probiotic Product (PROBIOTIC ADVANCED) CAPS Take 1 capsule by mouth daily      apixaban (ELIQUIS) 5 MG TABS tablet Take 1 tablet by mouth 2 times daily 60 tablet 5     No current facility-administered medications for this visit. No Known Allergies  Vitals:    05/13/22 1533   BP: 125/89   Pulse: 99     Body mass index is 31.46 kg/m². Review of Systems   Constitutional: Negative. HENT: Negative. Eyes: Negative. Respiratory: Negative. Cardiovascular: Negative. Gastrointestinal: Positive for abdominal distention. Genitourinary: Negative for difficulty urinating, dyspareunia, dysuria, enuresis, frequency, hematuria, menstrual problem, pelvic pain, urgency and vaginal discharge. Musculoskeletal: Negative. Skin: Negative. Neurological: Negative. Psychiatric/Behavioral: Negative. Physical Exam  Vitals and nursing note reviewed. Constitutional:       General: She is not in acute distress. Appearance: She is well-developed. She is not diaphoretic. HENT:      Head: Normocephalic and atraumatic. Eyes:      Conjunctiva/sclera: Conjunctivae normal.      Pupils: Pupils are equal, round, and reactive to light.    Pulmonary:      Effort: Pulmonary effort is normal.   Abdominal:      Tenderness: There is no guarding. Comments: Abdominal incisions healing well, glue present still. Abdomen softly distended, good bowel sounds. Musculoskeletal:         General: Normal range of motion. Cervical back: Normal range of motion. Comments: Normal ROM in all 4 extremities; normal gait   Skin:     General: Skin is warm and dry. Neurological:      Mental Status: She is alert and oriented to person, place, and time. Motor: No abnormal muscle tone. Coordination: Coordination normal.   Psychiatric:         Behavior: Behavior normal.          Diagnosis Orders   1. S/P hysterectomy with oophorectomy     2. Bloating         MEDICATIONS:  No orders of the defined types were placed in this encounter. ORDERS:  No orders of the defined types were placed in this encounter. PLAN:  Counseling was offered and accepted by patient. The operative report and surgical findings were discussed with the patient. She is aware pathology negative. She was instructed to gradually resume normal activity. She was also instructed to call or return to the office if complications occur. Patient voiced understanding of above. Return in 4 weeks      Patient Instructions   We are committed to providing you with the best care possible. In order to help us achieve these goals please remember to bring all medications, herbal products, and over the counter supplements with you to each visit. If your provider has ordered testing for you, please be sure to follow up with our office if you have not received results within 7 days after testing took place. *If you receive a survey after visiting one of our offices, please take the time to share your experience concerning your physician office visit. These surveys are confidential and no health information about you is shared.  We are eager to improve for you and we are counting on your feedback to help make that happen.

## 2022-05-13 NOTE — PROGRESS NOTES
HISTORY OF PRESENT ILLNESS:    Ms. Jennifer Coats presents today for a breast exam followed by her annual mammogram.  This is following left mastectomy with tissue expander reconstruction on 7/27/2021. She was anticipating Shana flap reconstruction however she had bilateral pulmonary emboli and has been on anticoagulation the last several months. She had her Shana flap reconstruction in [place] and that went well. She has also had a mastopexy on the right which looks good. She initially presented with an ultrasound guided breast biopsy  on the left which revealed a tiny micropapillary ductal   carcinoma in situ. ER is positive at 94%. MRI-5/26/2021  FINDINGS:  There is a large 8.5 x 3.3 x 5.4 cm area of non-mass enhancement in   the LEFT upper outer breast, located along the lateral and inferior   aspect of the biopsy clip marker. No focal suspicious solid mass. No   mass adjacent to the biopsy clip marker. No abnormal LEFT breast skin   thickening or nipple enhancement. No abnormal enhancement or suspicious mass in the RIGHT breast. No   abnormal RIGHT breast skin thickening or nipple enhancement. No enlarged axillary or internal mammary lymph nodes. Limited   evaluation of the anterior chest and upper abdomen is unremarkable. No   suspicious osseous lesions.       Impression   Impression:   1.  Large 8.5 x 3.3 x 5.4 cm area of regional non-mass enhancement in   the LEFT upper outer breast, with differential diagnosis including   DCIS. 2.  No suspicious focal mass in either breast. No focal mass adjacent   to the LEFT breast biopsy clip marker. 3.  No axillary or internal mammary lymphadenopathy. BI-RADS CATEGORY 6: KNOWN BIOPSY WITH PROVEN MALIGNANCY. Signed by Dr Emanuel Yancey on 5/26/2021 8:20 AM     Review of her MRI and her mammogram demonstrated an area of significant concern which is distinctly separate from the site of her initial biopsy.   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 This area however spreads over an 8 cm portion of her breast    Mammogram-5/16/2022  Findings:  Postoperative changes of the left breast   from mastectomy and reconstruction surgery. Surgical clips and linear   foreign body within the posterior left reconstructed breast/chest   wall. No suspicious masses or calcifications are identified. Breast   tissues stable in configuration. Morphologically benign-appearing   lymph nodes in the visible axilla. This study was interpreted with   CAD. IMPRESSION AND RECOMMENDATION:    Left mastectomy with ROSY reconstruction . No mammographic evidence of   malignancy. Continued annual screening mammography recommended. BIRADS Category 2 - Benign findings       PATHOLOGY REVEALS:  FINAL DIAGNOSIS:     A.  Breast, left skin and nipple sparing mastectomy:   1.  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 2.  In situ carcinoma demonstrates extensive intraluminal mucin   production. 3.  In situ carcinoma measures at least 8.0 cm in greatest dimension. 4.  In situ carcinoma extends to within 0.1 cm of the deep surgical   excision margin and 0.4 cm of the anterior margin. 5.  Negative for invasive carcinoma. B.  Breast, excision of additional breast tissue from beneath nipple:   Benign breast parenchyma. C.  Breast, excision of additional left breast anterior margin: Benign   breast parenchyma. AJCC STAGE: pTis, pNx, pMx     She subsequently had a pulmonary embolus and is now on Eliquis for 3 months. We have been doing her fills in lieu of proceeding with her autologous reconstruction. I have discussed her at length with Walter Fox. She had a mammogram today on the right breast which was unremarkable.   Unfortunately they did a mammogram of the left breast which showed benign looking fat but will certainly show areas of fat necrosis in the future and I encouraged her not to mammogram the Shana flap. PHYSICAL EXAM:  The  wounds look good with no evidence of infection, fluid accumulation, or skin necrosis. IMPRESSION:    Doing well s/p left nipple sparing mastectomy   Pulmonary embolus on Eliquis    PLAN: Follow-up in 6 months physical exam      I have seen, examined and reviewed this patient medication list, appropriate labs and imaging studies. I reviewed relevant medical records and others physicians notes. I discussed the plans of care with the patient. I answered all the questions to the patients satisfaction. I, Dr Nicole Duffy, personally performed the services described in this documentation as scribed by Antonia Del Valle MA in my presence and is both accurate and complete. (Please note that portions of this note were completed with a voice recognition program. Efforts were made to edit the dictations but occasionally words are mis-transcribed.)  Over 50% of the total visit time of  20 minutes in face to face encounter with the patient, out of which more than 50% of the time was spent in counseling patient or family and coordination of care. Counseling included but was not limited to time spent reviewing labs, imaging studies/ treatment plan and answering questions.

## 2022-05-16 ENCOUNTER — HOSPITAL ENCOUNTER (OUTPATIENT)
Dept: WOMENS IMAGING | Age: 52
Discharge: HOME | End: 2022-05-16
Payer: COMMERCIAL

## 2022-05-16 ENCOUNTER — OFFICE VISIT (OUTPATIENT)
Dept: SURGERY | Age: 52
End: 2022-05-16
Payer: COMMERCIAL

## 2022-05-16 VITALS — HEART RATE: 72 BPM | DIASTOLIC BLOOD PRESSURE: 78 MMHG | SYSTOLIC BLOOD PRESSURE: 114 MMHG

## 2022-05-16 DIAGNOSIS — Z90.12 S/P LEFT MASTECTOMY: ICD-10-CM

## 2022-05-16 DIAGNOSIS — D05.12 DUCTAL CARCINOMA IN SITU (DCIS) OF LEFT BREAST: Primary | ICD-10-CM

## 2022-05-16 DIAGNOSIS — Z12.31 VISIT FOR SCREENING MAMMOGRAM: ICD-10-CM

## 2022-05-16 PROCEDURE — 99213 OFFICE O/P EST LOW 20 MIN: CPT | Performed by: SURGERY

## 2022-05-16 PROCEDURE — 77063 BREAST TOMOSYNTHESIS BI: CPT

## 2022-05-19 ENCOUNTER — HOSPITAL ENCOUNTER (OUTPATIENT)
Dept: INFUSION THERAPY | Age: 52
Discharge: HOME | End: 2022-05-19
Payer: COMMERCIAL

## 2022-05-19 ENCOUNTER — OFFICE VISIT (OUTPATIENT)
Dept: HEMATOLOGY | Age: 52
End: 2022-05-19
Payer: COMMERCIAL

## 2022-05-19 VITALS
SYSTOLIC BLOOD PRESSURE: 128 MMHG | HEART RATE: 86 BPM | DIASTOLIC BLOOD PRESSURE: 64 MMHG | WEIGHT: 195.9 LBS | BODY MASS INDEX: 32.1 KG/M2 | OXYGEN SATURATION: 98 %

## 2022-05-19 DIAGNOSIS — D05.12 INTRADUCTAL CARCINOMA IN SITU OF LEFT BREAST: Primary | ICD-10-CM

## 2022-05-19 DIAGNOSIS — D64.9 ANEMIA, UNSPECIFIED TYPE: ICD-10-CM

## 2022-05-19 DIAGNOSIS — E55.9 VITAMIN D DEFICIENCY: ICD-10-CM

## 2022-05-19 DIAGNOSIS — D05.12 INTRADUCTAL CARCINOMA IN SITU OF LEFT BREAST: ICD-10-CM

## 2022-05-19 DIAGNOSIS — Z78.0 POSTMENOPAUSAL: ICD-10-CM

## 2022-05-19 DIAGNOSIS — I26.99 BILATERAL PULMONARY EMBOLISM (HCC): ICD-10-CM

## 2022-05-19 DIAGNOSIS — D05.12 DUCTAL CARCINOMA IN SITU (DCIS) OF LEFT BREAST: ICD-10-CM

## 2022-05-19 LAB
ALP BLD-CCNC: 138 U/L (ref 35–104)
ANION GAP SERPL CALCULATED.3IONS-SCNC: 10 MMOL/L (ref 7–19)
BASOPHILS ABSOLUTE: 0.07 K/UL (ref 0.01–0.08)
BASOPHILS RELATIVE PERCENT: 0.8 % (ref 0.1–1.2)
CALCIUM SERPL-MCNC: 9.9 MG/DL (ref 8.4–10.2)
CHLORIDE BLD-SCNC: 101 MMOL/L (ref 98–111)
CO2: 29 MMOL/L (ref 22–29)
EOSINOPHILS ABSOLUTE: 0.35 K/UL (ref 0.04–0.54)
EOSINOPHILS RELATIVE PERCENT: 3.8 % (ref 0.7–7)
HCT VFR BLD CALC: 41.4 % (ref 34.1–44.9)
HEMOGLOBIN: 12.6 G/DL (ref 11.2–15.7)
LYMPHOCYTES ABSOLUTE: 2.39 K/UL (ref 1.18–3.74)
LYMPHOCYTES RELATIVE PERCENT: 26 % (ref 19.3–53.1)
MCH RBC QN AUTO: 29.9 PG (ref 25.6–32.2)
MCHC RBC AUTO-ENTMCNC: 30.4 G/DL (ref 32.3–35.5)
MCV RBC AUTO: 98.3 FL (ref 79.4–94.8)
MONOCYTES ABSOLUTE: 0.89 K/UL (ref 0.24–0.82)
MONOCYTES RELATIVE PERCENT: 9.7 % (ref 4.7–12.5)
NEUTROPHILS ABSOLUTE: 5.46 K/UL (ref 1.56–6.13)
NEUTROPHILS RELATIVE PERCENT: 59.4 % (ref 34–71.1)
PDW BLD-RTO: 14 % (ref 11.7–14.4)
PLATELET # BLD: 379 K/UL (ref 182–369)
PMV BLD AUTO: 10.5 FL (ref 7.4–10.4)
RBC # BLD: 4.21 M/UL (ref 3.93–5.22)
TOTAL PROTEIN: 7.6 G/DL (ref 6.3–8.2)
VITAMIN D 25-HYDROXY: 57.1 NG/ML
WBC # BLD: 9.19 K/UL (ref 3.98–10.04)

## 2022-05-19 PROCEDURE — 99212 OFFICE O/P EST SF 10 MIN: CPT

## 2022-05-19 PROCEDURE — 85025 COMPLETE CBC W/AUTO DIFF WBC: CPT

## 2022-05-19 PROCEDURE — 99214 OFFICE O/P EST MOD 30 MIN: CPT | Performed by: NURSE PRACTITIONER

## 2022-05-19 PROCEDURE — 36415 COLL VENOUS BLD VENIPUNCTURE: CPT

## 2022-05-19 RX ORDER — LETROZOLE 2.5 MG/1
2.5 TABLET, FILM COATED ORAL DAILY
Qty: 30 TABLET | Refills: 3 | Status: SHIPPED | OUTPATIENT
Start: 2022-05-19 | End: 2022-09-08

## 2022-05-19 NOTE — PROGRESS NOTES
Progress Note      Pt Name: Trinity Learn: 1970  MRN: [id number]    Date of evaluation: 05/19/2022  History Obtained From:  patient, electronic medical record    CHIEF COMPLAINT:    Chief Complaint   Patient presents with    Follow-up     Intraductal carcinoma in situ of left breast    Other     Provoked bilateral pulmonary emboli     HISTORY OF PRESENT ILLNESS:    Lala Copeland is a 50 y.o.  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 Priti Polanco was initiated on preventive endocrine therapy with tamoxifen 20 mg daily on 6/21/2021 followed by left nipple sparing mastectomy with immediate tissue expander reconstruction on 7/27/2021. She presented to [address] [place] [address] ED on 8/6/2021 right sharp pleuritic chest discomfort and shortness of breath. CTA of the chest revealed bilateral pulmonary embolus with no evidence of right heart strain and she was initiated on heparin drip and then transitioned to Eliquis on 8/8/2021. Priti Polanco returns today in scheduled follow-up for evaluation, lab monitoring and further treatment recommendations. Preventive endocrine therapy has been on hold she has continued with therapeutic Eliquis 5 mg p.o. twice daily. Since Desiree's last follow-up visit she has had a total laparoscopic hysterectomy and bilateral salpingo-oophorectomy on 4/28/2022. Today's clinic visit to include assessment, labs and plan of care are documented in assessment and plan section below. Lab results reviewed with patient today and are documented below.      ONCOLOGIC HISTORY:  Ductal Carcinoma in Situ of the left breast, ER 94%, stage 0, pTis, pNx, pMx  Priti Polanco was seen in initial medical oncology and hematology consultation on 8/7/2021 regarding bilateral pulmonary emboli in the setting of a recent left mastectomy for DCIS.   Priti Polanco established care and a well visit with [facility], APRKAYKAY on 4/13/2021.  A Pap smear was completed and she was incidentally noted to have an elevated blood pressure and placed on lisinopril with recommendations for close monitoring of BP.  Orders were also given for routine screening arrangements were made for bilateral mammogram.     Bilateral mammogram at [facility] on 4/19/2021  · Left breast with multiple groups of calcifications, which have increased in size and number compared to 07/14/2014. · No mammographic evidence of malignancy in the right breast.      Left mammogram with CAD at [facility]"" on 4/23/2021  · Multiple similar appearing groups of indeterminate calcifications in   the left breast. Recommend stereotactic left breast biopsy.      Ultrasound-guided left breast biopsy at [facility] on 4/30/2021   · Pathology indicating atypical ductal hyperplasia.    · Tissue was sent to Dr. Kaden Vela for consultation and she reported \"the changes are concerning for low-grade ductal carcinoma in situ, but are insufficiently developed to establish the diagnosis and the sample.  Final classification should be made on excisional biopsy specimen\".     MRI of bilateral breast on 5/26/2021 at [facility]  · Large 8.5 x 3.3 x 5.4 cm area of regional non-mass enhancement in the LEFT upper outer breast, with differential diagnosis including DCIS. · No suspicious focal mass in either breast. No focal mass adjacentto the LEFT breast biopsy clip marker. · No axillary or internal mammary lymphadenopathy      Bianca Handy was seen in initial consultation by KATHLEEN Duron on 5/21/2021 followed by an appointment with Dr. Evan Sen on 5/27/2021.     Left breast biopsy on 6/2/2021 by Dr. Evan Sen  · Micropapillary ductal carcinoma in situ.   · Negative for invasion  · ER is positive at 94%.       Seen by Dr. Evan Sen on 6/21/2021 and initiated on preventive tamoxifen 20 mg PO.       Left nipple sparing mastectomy with immediate tissue expander reconstruction on 7/27/2021 by Dr. Maldonado Cortez  Pathology:   1.  Extensive low-grade ductal carcinoma in situ (micropapillary and cribriform types). 2.  In situ carcinoma demonstrates extensive intraluminal mucin production. 3.  In situ carcinoma measures at least 8.0 cm in greatest dimension. 4.  In situ carcinoma extends to within 0.1 cm of the deep surgical excision margin and 0.4 cm of the anterior margin. 5.  Negative for invasive carcinoma. AJCC STAGE: pTis, pNx, pMx      Dr. Robertha Homans discussed the following with Dr. Jonn Syed who is in agreement with holding tamoxifen until after completion of left breast reconstruction. At that time if declared postmenopausal can consider initiating preventative Femara or will consider resuming tamoxifen. Tamoxifen was started in the preventative setting. This of course is to prevent new disease developments over the future years in the right breast.  Although Cindy Brown will be on anticoagulation, she does have future surgeries scheduled for reconstruction. Dr. Robertha Homans prefered that she be off of tamoxifen which can be a prothrombotic agent until after all of that has been accomplished.     Recommend stopping Tamoxifen due to thrombotic risk factor on 8/7/2021  The issue of preventive therapy will be addressed after completion of left breast reconstruction.    At that time, if declared postmenopausal will consider initiating preventative Femara. Reports irregular menstrual cycles and the last menstrual cycle was in March 2021.      DIP flap reconstruction on 02//22/2022 per Dr Lawrence Azul. She did experience some postoperative bleeding which led to a drop in hemoglobin and she was placed on ferrous sulfate 325 mg 3 times a day by Dr. Lawrence Azul. IUD removed on 1/14/2022.   She has decided to move forward with hysterectomy now versus in the fall and is seeing Dr Remberto Bledsoe and planning on hysterectomy in April or May 2022.     04/28/2022- Total laparoscopic hysterectomy and bilateral salpingo-oophorectomy. Pathology benign. 05/16/2022 Bilateral mammogram- no mammographic evidence of malignancy; left mastectomy with ROSY reconstruction      Treatment summary:  · Initiated on preventative tamoxifen 20 mg on 6/21/2021  · Left nipple sparing mastectomy with immediate tissue expander reconstruction on 7/27/2021 by Dr. Desiree Lion  · Preventative tamoxifen held on 8/7/2021  · 5/19/2022-initiate adjuvant endocrine therapy with Femara 2.5 mg daily      HEMATOLOGIC HISTORY:  Provoked bilateral pulmonary emboli, 8/6/2021 (suspect secondary to tamoxifen and surgical intervention)  Divya Johnson was admitted to [facility] to the ED on 8/6/2021 with bilateral pulmonary emboli.   Divya Johnson presented to [address] [place] [address] ED with right-sided sharp pleuritic chest discomfort over the previous 12-24 hours. The pain was a stabbing knifelike sensation    CTA of the chest with contrast on 8/6/2021  · Small to moderate amount of pulmonary embolus greatest in the right lower lobe pulmonary artery branches. There is bilateral pulmonary embolus. · No definite CT evidence of right heart strain.   · Mild cardiomegaly. · Focal parenchymal consolidation in the right lower lobe with surrounding groundglass opacity in the same distribution as thegreatest amount of pulmonary embolus. This may represent a pulmonary infarct.     PT 14.0/INR 1.06 and aPTT 21.3 on 8/6/2021  COVID-19, rapid negative on 8/6/2021     Divya Johnson was placed on on heparin drip and admitted for further evaluation and recommendations.     Noninvasive venous study of bilateral lower extremities  on 8/7/2021 with no evidence of DVT, SVT or reflux.     Serology studies on 8/7/2021  · Anticardiolipin IgA 1.5, IgG 1.3, IgM 3.8 -negative  · Beta 2 Glycoprotein IgG 0, IgM 2 (0-20)  · Cardiolipin antibodies IgG <10, IgM <10 (0-14)  · Protein C Ag >95 (%)  · Protein S Ag 107 (%)  · Antithrombin activity 92 ()  · Antithrombin Ag 76 ()  · Lupus anticoagulant-  not detected  · Factor V Leiden - negative  · Prothrombin gene mutation - negative  · MTHFR: compound heterozygous Mutation C677T and Mutation [id number] (may be associated with a mild decrease in MTHFR enzyme activity although clinically insignificant and most likely not a factor recent finding of pulmonary emboli)   · Homocysteine 5 (0-15)    Heparin discontinued and initiated Eliquis 10 mg p.o. twice daily x7 days followed by Eliquis 5 mg p.o. twice daily on 2021.     JAK2 V617F flex to CA LR/exon 12-15/MPL was negative on 2021 CTA Chest- No evidence of embolic disease.  There is no residual from a emboli  described 2021.    2021 FSH- 77.2 (postmenopause)      Past Medical History:    Past Medical History:   Diagnosis Date    [facility] [address]     Cancer ([facility] [address] 2021    DCIS Left Breast      delivery delivered     Ductal carcinoma in situ (DCIS) of left breast 2021    History of D&C     1993    Hx of blood clots     Post op  left Mastectomy Eliquis    Hypertension        Past Surgical History:    Past Surgical History:   Procedure Laterality Date    APPENDECTOMY       SECTION      DILATION AND CURETTAGE OF UTERUS      X2    HYSTERECTOMY Bilateral 2022    ROBOTIC TOTAL LAPAROSCOPIC HYSTERECTOMY, BILATERAL SALPINGO-OOPHORECTOMY, CYSTOSCOPY performed by Vivian Sharma MD at [facility] Left 2021    LEFT NIPPLE SPARING MASTECTOMY VIA WISE PATTERN, TISSUE EXPANDER RECONSTRUCTION, PEC BLOCK performed by Alvina Rios MD at [address] [facility] [address] LEFT Left 2021    US BREAST NEEDLE BIOPSY LEFT 2021 [facility] GENERAL SURGERY       Current Medications:    Current Outpatient Medications   Medication Sig Dispense Refill    letrozole (FEMARA) 2.5 MG tablet Take 1 tablet by mouth daily 30 tablet 3    fluconazole (DIFLUCAN) 150 MG tablet Take one now and repeat in 1 wk if needed 2 tablet 0    vitamin D (ERGOCALCIFEROL) 1.25 MG (11217 UT) CAPS capsule Take 1 capsule by mouth once a week (Patient taking differently: Take 50,000 Units by mouth once a week Indications: Wednesday ) 12 capsule 1    Multiple Vitamin (MULTIVITAMIN ADULT PO) Take 1 tablet by mouth daily       lisinopril (PRINIVIL;ZESTRIL) 5 MG tablet Take 1/2 (one-half) tablet by mouth once daily (Patient taking differently: Take 2.5 mg by mouth daily ) 45 tablet 3    Ascorbic Acid (VITAMIN C) 500 MG CAPS Take 500 mg by mouth daily       Fluticasone Propionate (FLONASE ALLERGY RELIEF NA)       cetirizine (ZYRTEC) 10 MG tablet Take 10 mg by mouth daily      Probiotic Product (PROBIOTIC ADVANCED) CAPS Take 1 capsule by mouth daily      apixaban (ELIQUIS) 5 MG TABS tablet Take 1 tablet by mouth 2 times daily 60 tablet 5     No current facility-administered medications for this visit. Allergies: No Known Allergies    Social History:    Social History     Tobacco Use    Smoking status: Never Smoker    Smokeless tobacco: Never Used   Vaping Use    Vaping Use: Never used   Substance Use Topics    Alcohol use: Never    Drug use: Never       Family History:   Family History   Problem Relation Age of Onset    Hypertension Mother     No Known Problems Father     Thyroid Disease Sister     Cancer Maternal Great Grandmother         Unknown       Vitals:  Vitals:    05/19/22 1309   BP: 128/64   Pulse: 86   SpO2: 98%   Weight: 195 lb 14.4 oz (88.9 kg)        Subjective   REVIEW OF SYSTEMS:   Review of Systems   Constitutional: Positive for fatigue. Negative for chills, diaphoresis and fever. HENT: Negative. Negative for congestion, ear pain, hearing loss, nosebleeds, sore throat and tinnitus. Eyes: Negative. Negative for pain, discharge and redness. Respiratory: Negative. Negative for cough, shortness of breath and wheezing.     Cardiovascular: Comments: Range of motion within normal limits x4 extremities   Skin:     General: Skin is warm. Findings: No bruising, erythema or rash. Neurological:      Mental Status: She is alert and oriented to person, place, and time. Cranial Nerves: No cranial nerve deficit. Coordination: Coordination normal.   Psychiatric:         Behavior: Behavior normal.         Thought Content: Thought content normal.       Labs reviewed today:  Lab Results   Component Value Date    WBC 9.19 05/19/2022    HGB 12.6 05/19/2022    HCT 41.4 05/19/2022    MCV 98.3 (H) 05/19/2022     (H) 05/19/2022     Lab Results   Component Value Date    NEUTROABS 5.46 05/19/2022         ASSESSMENT/PLAN:      1. 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 Initiated on preventive tamoxifen 20 mg p.o. daily by Dr. Tab Beach on 6/21/2021 and placed on hold on 8/7/2021 due to provoked bilateral pulmonary emboli. DIP flap reconstruction on 02//22/2022 per Dr Mj Leone. Anticipate second part of her reconstruction surgery this Fall. Status post total laparoscopic hysterectomy and bilateral salpingo-oophorectomy on 4/28/2022.    05/16/2022 Bilateral mammogram- no mammographic evidence of malignancy; left mastectomy with ROSY reconstruction    -Initiate preventive Femara 2.5 mg p.o. daily for anticipated 5 years  -Follow-up with Dr. Mj Leone as recommended      Bone health:  Please schedule baseline bone mineral density study, postmenopausal      2. Provoked bilateral pulmonary embolism, 8/6/2021. JAK2 V617F flex to CA LR/exon 12-15/MPL was negative on 9/1/2021. Reports compliant with Eliquis milligrams p.o. twice daily tolerating without difficulty. 04/269/2022 CTA pulmonary with contrast- Less than optimal opacification of the pulmonary arteries. No visible pulmonary embolus. Main pulmonary artery segment upper limits of normal in size measuring 2.9 cm.  Ascending thoracic aorta is dilated at 4 cm. Heart size is prominent. Linear atelectasis in both lung bases. -Continue Eliquis 5 mg p.o. twice daily, plan to continue anticoagulation till has completed all surgical interventions, next breast reconstruction, phase 2 planned for the fall 2022    3. Acute blood loss anemia,  experience some postoperative bleeding which led to a drop in hemoglobin and she was placed on ferrous sulfate 325 mg 3 times a day by Dr. Ernie Troncoso. She reports no longer taking oral iron supplement. Denies any further bleeding. Hemoglobin normalized at 12.6 with hematocrit of 41.4. Iron substrates on 3/23/2022 revealed ferritin of 150.9 and iron saturation 59%. I discussed all of the above findings included in the assessment and plan with the patient and the patient is in agreement to move forward with current recommendations/treatment. I have addressed all of their questions and concerns that were verbalized. FOLLOW UP:  Follow-up appointment given for 2 months or sooner if needed  Continue to follow with other medical providers as recommended. Labs at next visit: CBC and CMP    EMR Dragon/Transcription disclaimer:   Much of this encounter note is an electronic transcription/translation of spoken language to printed text. The electronic translation of spoken language may permit erroneous, or at times, nonsensical words or phrases to be inadvertently transcribed; although attempts have made to review the note for such errors, some may still exist.  Please excuse any unrecognized transcription errors and contact us if the air is unintelligible or needs documented correction. Also, portions of this note have been copied forward, however, changed to reflect the most current clinical status of this patient. I, Osiris Gaxiola, am pre-charting as a registered nurse for [employer].   Electronically signed by ENRIQUE Bolivar on 5/24/2022 at 4:18 PM

## 2022-05-19 NOTE — PATIENT INSTRUCTIONS
emergency medical attention or call the Poison Help line at 1-858.533.9650. What should I avoid while taking letrozole? Avoid driving or hazardous activity until you know how this medicine willaffect you. Your reactions could be impaired. What are the possible side effects of letrozole? Get emergency medical help if you have signs of an allergic reaction: hives; difficult breathing; swelling of your face, lips, tongue, or throat. Common side effects may include:   hot flashes, warmth or redness in your face or chest;   headache, dizziness, weakness;   bone pain, muscle or joint pain;   swelling, weight gain;   increased sweating; or   increased cholesterol in your blood. This is not a complete list of side effects and others may occur. Call your doctor for medical advice about side effects. You may report side effects toFDA at 2-138-PXO-8049. What other drugs will affect letrozole? Other drugs may affect letrozole, including prescription and over-the-counter medicines, vitamins, and herbal products. Tell your doctor about all yourcurrent medicines and any medicine you start or stop using. Where can I get more information? Your pharmacist can provide more information about letrozole. Remember, keep this and all other medicines out of the reach of children, never share your medicines with others, and use this medication only for the indication prescribed. Every effort has been made to ensure that the information provided by [address]ë [address] is accurate, up-to-date, and complete, but no guarantee is made to that effect. Drug information contained herein may be time sensitive. [facility] information has been compiled for use by healthcare practitioners and consumers in the [facility] and therefore [facility] does not warrant that uses outside of the [facility] are appropriate, unless specifically indicated otherwise.  [facility] drug information does not endorse drugs, diagnose patients or recommend therapy. [facility]'s drug information is an informational resource designed to assist licensed healthcare practitioners in caring for their patients and/or to serve consumers viewing this service as a supplement to, and not a substitute for, the expertise, skill, knowledge and judgment of healthcare practitioners. The absence of a warning for a given drug or drug combination in no way should be construed to indicate that the drug or drug combination is safe, effective or appropriate for any given patient. [facility] does not assume any responsibility for any aspect of healthcare administered with the aid of information [facility] provides. The information contained herein is not intended to cover all possible uses, directions, precautions, warnings, drug interactions, allergic reactions, or adverse effects. If you have questions about the drugs you are taking, check with yourdoctor, nurse or pharmacist.  Copyright 8222-9667 [address]. Version: 8.02. Revision date: 5/14/2020. Care instructions adapted under license by [facility] ([facility]). If you have questions about a medical condition or this instruction, always ask your healthcare professional. [place] [address] any warranty or liability for your use of this information.

## 2022-05-24 ASSESSMENT — ENCOUNTER SYMPTOMS
GASTROINTESTINAL NEGATIVE: 1
CONSTIPATION: 0
NAUSEA: 0
EYE DISCHARGE: 0
SORE THROAT: 0
EYE REDNESS: 0
DIARRHEA: 0
COUGH: 0
BACK PAIN: 0
RESPIRATORY NEGATIVE: 1
EYES NEGATIVE: 1
BLOOD IN STOOL: 0
EYE PAIN: 0
VOMITING: 0
ABDOMINAL PAIN: 0
WHEEZING: 0
SHORTNESS OF BREATH: 0

## 2022-05-27 ASSESSMENT — ENCOUNTER SYMPTOMS: ABDOMINAL PAIN: 1

## 2022-06-02 ENCOUNTER — HOSPITAL ENCOUNTER (OUTPATIENT)
Dept: WOMENS IMAGING | Age: 52
Discharge: HOME | End: 2022-06-02
Payer: COMMERCIAL

## 2022-06-02 DIAGNOSIS — Z78.0 POSTMENOPAUSAL: ICD-10-CM

## 2022-06-02 PROCEDURE — 77080 DXA BONE DENSITY AXIAL: CPT | Performed by: RADIOLOGY

## 2022-06-02 PROCEDURE — 77080 DXA BONE DENSITY AXIAL: CPT

## 2022-06-10 ENCOUNTER — OFFICE VISIT (OUTPATIENT)
Dept: OBGYN CLINIC | Age: 52
End: 2022-06-10

## 2022-06-10 VITALS
DIASTOLIC BLOOD PRESSURE: 80 MMHG | BODY MASS INDEX: 32.78 KG/M2 | WEIGHT: 200 LBS | SYSTOLIC BLOOD PRESSURE: 116 MMHG | HEART RATE: 83 BPM

## 2022-06-10 DIAGNOSIS — Z90.710 S/P HYSTERECTOMY WITH OOPHORECTOMY: Primary | ICD-10-CM

## 2022-06-10 DIAGNOSIS — Z90.721 S/P HYSTERECTOMY WITH OOPHORECTOMY: Primary | ICD-10-CM

## 2022-06-10 PROCEDURE — 99024 POSTOP FOLLOW-UP VISIT: CPT | Performed by: NURSE PRACTITIONER

## 2022-06-10 ASSESSMENT — ENCOUNTER SYMPTOMS
EYES NEGATIVE: 1
RESPIRATORY NEGATIVE: 1
GASTROINTESTINAL NEGATIVE: 1

## 2022-06-10 NOTE — PATIENT INSTRUCTIONS
Patient Education        Abdominal Hysterectomy: What to Expect at Home  Your Recovery     An abdominal hysterectomy removes the uterus through a large cut (incision) in the belly. Your doctor made an incision in your lower belly and took out youruterus. You can expect to feel better and stronger each day. But you might need pain medicine for a week or two. You may get tired easily or have less energy than usual. This may last for several weeks after surgery. And you also may havelight vaginal bleeding for a few weeks. It's important to avoid lifting while you are recovering so that you can heal.It may take about 4 to 6 weeks to fully recover. This care sheet gives you a general idea about how long it will take for you to recover. But each person recovers at a different pace. Follow the steps belowto get better as quickly as possible. How can you care for yourself at home? Activity     Rest when you feel tired. Getting enough sleep will help you recover.      Try to walk each day. Start by walking a little more than you did the day before. Bit by bit, increase the amount you walk. Walking boosts blood flow and helps prevent pneumonia and constipation.      Avoid lifting anything that would make you strain. This may include a child, heavy grocery bags and milk containers, a heavy briefcase or backpack, cat litter or dog food bags, or a vacuum .      Avoid strenuous activities, such as biking, jogging, weight lifting, or aerobic exercise, until your doctor says it is okay.      You may shower. Pat the cut (incision) dry. Do not take a bath for the first 2 weeks, or until your doctor tells you it is okay.      Ask your doctor when you can drive again.      You will probably need to take 2 to 4 weeks off from work. It depends on the type of work you do and how you feel.      Ask your doctor when it is okay for you to have sex. Diet     You can eat your normal diet.  If your stomach is upset, try bland, low-fat foods like plain rice, broiled chicken, toast, and yogurt.      Drink plenty of fluids (unless your doctor tells you not to).      You may notice that your bowel movements are not regular right after your surgery. This is common. Try to avoid constipation and straining with bowel movements. You may want to take a fiber supplement every day. If you have not had a bowel movement after a couple of days, ask your doctor about taking a mild laxative. Medicines     Your doctor will tell you if and when you can restart your medicines. You will also get instructions about taking any new medicines.      If you take aspirin or some other blood thinner, ask your doctor if and when to start taking it again. Make sure that you understand exactly what your doctor wants you to do.      Be safe with medicines. Take pain medicines exactly as directed. ? If the doctor gave you a prescription medicine for pain, take it as prescribed. ? If you are not taking a prescription pain medicine, ask your doctor if you can take an over-the-counter medicine.      If your doctor prescribed antibiotics, take them as directed. Do not stop taking them just because you feel better. You need to take the full course of antibiotics.      If you think your pain medicine is making you sick to your stomach:  ? Take your medicine after meals (unless your doctor has told you not to). ? Ask your doctor for a different pain medicine. Incision care     If you have strips of tape on the cut (incision) the doctor made, leave the tape on for a week or until it falls off. Or follow your doctor's instructions for removing the tape.      Wash the area daily with warm, soapy water, and pat it dry. Don't use hydrogen peroxide or alcohol, which can slow healing. You may cover the area with a gauze bandage if it weeps or rubs against clothing. Change the bandage every day.      Keep the area clean and dry.    Other instructions     You may have some light vaginal bleeding. Wear sanitary pads if needed. Do not douche or use tampons. Follow-up care is a key part of your treatment and safety. Be sure to make and go to all appointments, and call your doctor if you are having problems. It's also a good idea to know your test results and keep alist of the medicines you take. When should you call for help? Call 911 anytime you think you may need emergency care. For example, call if:     You passed out (lost consciousness).      You have chest pain, are short of breath, or cough up blood. Call your doctor now or seek immediate medical care if:     You have pain that does not get better after you take pain medicine.      You cannot pass stools or gas.      You have vaginal discharge that has increased in amount or smells bad.      You are sick to your stomach or cannot drink fluids.      You have loose stitches, or your incision comes open.      Bright red blood has soaked through the bandage over your incision.      You have signs of infection, such as:  ? Increased pain, swelling, warmth, or redness. ? Red streaks leading from the incision. ? Pus draining from the incision. ? A fever.      You have severe vaginal bleeding. This means that you are soaking through your usual pads every hour for 2 or more hours.      You have signs of a blood clot in your leg (called a deep vein thrombosis), such as:  ? Pain in your calf, back of the knee, thigh, or groin. ? Redness and swelling in your leg. Watch closely for changes in your health, and be sure to contact your doctor ifyou have any problems. Where can you learn more? Go to https://Nomiavani.Wellsphere. org and sign in to your [employer] account. Enter M280 in the [employer] box to learn more about \"Abdominal Hysterectomy: What to Expect at Home. \"     If you do not have an account, please click on the \"Sign Up Now\" link.   Current as of: November 22, 2021               Content Version: 13.2  © 0846-2247 Healthwise, Incorporated. Care instructions adapted under license by [facility] ([facility]). If you have questions about a medical condition or this instruction, always ask your healthcare professional. [address] any warranty or liability for your use of this information.

## 2022-06-10 NOTE — PROGRESS NOTES
Sig Dispense Refill    letrozole (FEMARA) 2.5 MG tablet Take 1 tablet by mouth daily 30 tablet 3    fluconazole (DIFLUCAN) 150 MG tablet Take one now and repeat in 1 wk if needed 2 tablet 0    vitamin D (ERGOCALCIFEROL) 1.25 MG (91586 UT) CAPS capsule Take 1 capsule by mouth once a week (Patient taking differently: Take 50,000 Units by mouth once a week Indications: Wednesday ) 12 capsule 1    Multiple Vitamin (MULTIVITAMIN ADULT PO) Take 1 tablet by mouth daily       lisinopril (PRINIVIL;ZESTRIL) 5 MG tablet Take 1/2 (one-half) tablet by mouth once daily (Patient taking differently: Take 2.5 mg by mouth daily ) 45 tablet 3    Ascorbic Acid (VITAMIN C) 500 MG CAPS Take 500 mg by mouth daily       Fluticasone Propionate (FLONASE ALLERGY RELIEF NA)       cetirizine (ZYRTEC) 10 MG tablet Take 10 mg by mouth daily      Probiotic Product (PROBIOTIC ADVANCED) CAPS Take 1 capsule by mouth daily      apixaban (ELIQUIS) 5 MG TABS tablet Take 1 tablet by mouth 2 times daily 60 tablet 5     No current facility-administered medications for this visit. No Known Allergies  Vitals:    06/10/22 1527   BP: 116/80   Pulse: 83     Body mass index is 32.78 kg/m². Review of Systems   Constitutional: Negative. HENT: Negative. Eyes: Negative. Respiratory: Negative. Cardiovascular: Negative. Gastrointestinal: Negative. Genitourinary: Negative for difficulty urinating, dyspareunia, dysuria, enuresis, frequency, hematuria, menstrual problem, pelvic pain, urgency and vaginal discharge. Musculoskeletal: Negative. Skin: Negative. Neurological: Negative. Psychiatric/Behavioral: Negative. Physical Exam  Vitals and nursing note reviewed. Constitutional:       General: She is not in acute distress. Appearance: She is well-developed. She is not diaphoretic. HENT:      Head: Normocephalic and atraumatic.    Eyes:      Conjunctiva/sclera: Conjunctivae normal.      Pupils: Pupils are equal, round, and reactive to light. Pulmonary:      Effort: Pulmonary effort is normal.   Abdominal:      Tenderness: There is no guarding. Comments: Abdominal incisions well healed and approximated. Tip of suture to far left incision poking out, trimmed flush to abdomen. Musculoskeletal:         General: Normal range of motion. Cervical back: Normal range of motion. Comments: Normal ROM in all 4 extremities; normal gait   Skin:     General: Skin is warm and dry. Neurological:      Mental Status: She is alert and oriented to person, place, and time. Motor: No abnormal muscle tone. Coordination: Coordination normal.   Psychiatric:         Behavior: Behavior normal.          Diagnosis Orders   1. S/P hysterectomy with oophorectomy         MEDICATIONS:  No orders of the defined types were placed in this encounter. ORDERS:  No orders of the defined types were placed in this encounter. PLAN:  [employer] [place] [employer] [place] next week NO SLIDES  2 more weeks for intercourse or heavy lifting. All her questions answered. Patient Instructions     Patient Education        Abdominal Hysterectomy: What to Expect at Home  Your Recovery     An abdominal hysterectomy removes the uterus through a large cut (incision) in the belly. Your doctor made an incision in your lower belly and took out youruterus. You can expect to feel better and stronger each day. But you might need pain medicine for a week or two. You may get tired easily or have less energy than usual. This may last for several weeks after surgery. And you also may havelight vaginal bleeding for a few weeks. It's important to avoid lifting while you are recovering so that you can heal.It may take about 4 to 6 weeks to fully recover. This care sheet gives you a general idea about how long it will take for you to recover. But each person recovers at a different pace. Follow the steps belowto get better as quickly as possible.   How can you care for yourself at home? Activity     Rest when you feel tired. Getting enough sleep will help you recover.      Try to walk each day. Start by walking a little more than you did the day before. Bit by bit, increase the amount you walk. Walking boosts blood flow and helps prevent pneumonia and constipation.      Avoid lifting anything that would make you strain. This may include a child, heavy grocery bags and milk containers, a heavy briefcase or backpack, cat litter or dog food bags, or a vacuum .      Avoid strenuous activities, such as biking, jogging, weight lifting, or aerobic exercise, until your doctor says it is okay.      You may shower. Pat the cut (incision) dry. Do not take a bath for the first 2 weeks, or until your doctor tells you it is okay.      Ask your doctor when you can drive again.      You will probably need to take 2 to 4 weeks off from work. It depends on the type of work you do and how you feel.      Ask your doctor when it is okay for you to have sex. Diet     You can eat your normal diet. If your stomach is upset, try bland, low-fat foods like plain rice, broiled chicken, toast, and yogurt.      Drink plenty of fluids (unless your doctor tells you not to).      You may notice that your bowel movements are not regular right after your surgery. This is common. Try to avoid constipation and straining with bowel movements. You may want to take a fiber supplement every day. If you have not had a bowel movement after a couple of days, ask your doctor about taking a mild laxative. Medicines     Your doctor will tell you if and when you can restart your medicines. You will also get instructions about taking any new medicines.      If you take aspirin or some other blood thinner, ask your doctor if and when to start taking it again. Make sure that you understand exactly what your doctor wants you to do.      Be safe with medicines.  Take pain medicines exactly as directed. ? If the doctor gave you a prescription medicine for pain, take it as prescribed. ? If you are not taking a prescription pain medicine, ask your doctor if you can take an over-the-counter medicine.      If your doctor prescribed antibiotics, take them as directed. Do not stop taking them just because you feel better. You need to take the full course of antibiotics.      If you think your pain medicine is making you sick to your stomach:  ? Take your medicine after meals (unless your doctor has told you not to). ? Ask your doctor for a different pain medicine. Incision care     If you have strips of tape on the cut (incision) the doctor made, leave the tape on for a week or until it falls off. Or follow your doctor's instructions for removing the tape.      Wash the area daily with warm, soapy water, and pat it dry. Don't use hydrogen peroxide or alcohol, which can slow healing. You may cover the area with a gauze bandage if it weeps or rubs against clothing. Change the bandage every day.      Keep the area clean and dry. Other instructions     You may have some light vaginal bleeding. Wear sanitary pads if needed. Do not douche or use tampons. Follow-up care is a key part of your treatment and safety. Be sure to make and go to all appointments, and call your doctor if you are having problems. It's also a good idea to know your test results and keep alist of the medicines you take. When should you call for help? Call 911 anytime you think you may need emergency care. For example, call if:     You passed out (lost consciousness).      You have chest pain, are short of breath, or cough up blood.    Call your doctor now or seek immediate medical care if:     You have pain that does not get better after you take pain medicine.      You cannot pass stools or gas.      You have vaginal discharge that has increased in amount or smells bad.      You are sick to your stomach or cannot drink fluids.      You have loose stitches, or your incision comes open.      Bright red blood has soaked through the bandage over your incision.      You have signs of infection, such as:  ? Increased pain, swelling, warmth, or redness. ? Red streaks leading from the incision. ? Pus draining from the incision. ? A fever.      You have severe vaginal bleeding. This means that you are soaking through your usual pads every hour for 2 or more hours.      You have signs of a blood clot in your leg (called a deep vein thrombosis), such as:  ? Pain in your calf, back of the knee, thigh, or groin. ? Redness and swelling in your leg. Watch closely for changes in your health, and be sure to contact your doctor ifyou have any problems. Where can you learn more? Go to https://Voices Heard MediapeFastSpringeb.[email or web address]. org and sign in to your [employer] account. Enter M280 in the [employer] box to learn more about \"Abdominal Hysterectomy: What to Expect at Home. \"     If you do not have an account, please click on the \"Sign Up Now\" link. Current as of: November 22, 2021               Content Version: 13.2  © 7009-3131 Healthwise, Incorporated. Care instructions adapted under license by [facility] ([facility]). If you have questions about a medical condition or this instruction, always ask your healthcare professional. [address] any warranty or liability for your use of this information.

## 2022-06-22 ENCOUNTER — OFFICE VISIT (OUTPATIENT)
Dept: PRIMARY CARE CLINIC | Age: 52
End: 2022-06-22
Payer: COMMERCIAL

## 2022-06-22 VITALS
RESPIRATION RATE: 16 BRPM | TEMPERATURE: 96.8 F | OXYGEN SATURATION: 98 % | BODY MASS INDEX: 31.98 KG/M2 | WEIGHT: 199 LBS | HEIGHT: 66 IN | HEART RATE: 77 BPM | DIASTOLIC BLOOD PRESSURE: 80 MMHG | SYSTOLIC BLOOD PRESSURE: 130 MMHG

## 2022-06-22 DIAGNOSIS — Z00.00 WELL ADULT HEALTH CHECK: Primary | ICD-10-CM

## 2022-06-22 DIAGNOSIS — Z12.11 SCREENING FOR COLON CANCER: ICD-10-CM

## 2022-06-22 DIAGNOSIS — D05.12 INTRADUCTAL CARCINOMA IN SITU OF LEFT BREAST: ICD-10-CM

## 2022-06-22 DIAGNOSIS — I26.99 BILATERAL PULMONARY EMBOLISM (HCC): ICD-10-CM

## 2022-06-22 PROCEDURE — 99396 PREV VISIT EST AGE 40-64: CPT | Performed by: NURSE PRACTITIONER

## 2022-06-22 RX ORDER — ONDANSETRON 4 MG/1
4 TABLET, FILM COATED ORAL 3 TIMES DAILY PRN
Qty: 30 TABLET | Refills: 0 | Status: SHIPPED | OUTPATIENT
Start: 2022-06-22

## 2022-06-22 ASSESSMENT — ENCOUNTER SYMPTOMS
GASTROINTESTINAL NEGATIVE: 1
EYES NEGATIVE: 1
RESPIRATORY NEGATIVE: 1

## 2022-06-22 NOTE — PROGRESS NOTES
[facility] PHYSICIAN SERVICES  [facility]  [address]  [address] [address]  Dept: [phone]  Dept Fax: [phone]  Loc: [phone]    Tawana Garnett is a 50 y.o. female who presents today for her medical conditions/complaints as noted below. Tawana Garnett is c/o of Annual Exam (patient presents today for her annual physical. )        HPI:     HPI   Chief Complaint   Patient presents with    Annual Exam     patient presents today for her annual physical.    She has 1 more surgery for reconstruction on her breast she is done really well. She is seeing oncology and they want her to have a colon screening. She really would prefer to have a Cologuard right now. She has no family history of colon cancer. She is on the blood thinner and she does not want to come off of it to have a colonoscopy. She is very concerned about her weight gain. She is gained about 30 pounds during her breast cancer treatments. Since starting the hormones blocker she feels like she is hungry all the time.   Lab Results   Component Value Date    TSH 2.150 2021     Past Medical History:   Diagnosis Date    Breast cancer ([address]     Cancer ([address] 2021    DCIS Left Breast      delivery delivered     Ductal carcinoma in situ (DCIS) of left breast 2021    History of D&C     1993    Hx of blood clots     Post op  left Mastectomy Eliquis    Hypertension       Past Surgical History:   Procedure Laterality Date    APPENDECTOMY      BREAST RECONSTRUCTION Left 2022     SECTION      DILATION AND CURETTAGE OF UTERUS      X2    HYSTERECTOMY (CERVIX STATUS UNKNOWN) Bilateral 2022    ROBOTIC TOTAL LAPAROSCOPIC HYSTERECTOMY, BILATERAL SALPINGO-OOPHORECTOMY, CYSTOSCOPY performed by Ann Enriquez MD at [place] [address] Left 2021    LEFT NIPPLE SPARING MASTECTOMY VIA WISE PATTERN, TISSUE EXPANDER RECONSTRUCTION, PEC BLOCK performed by Kinga Brumfield MD [address] BIOPSY LEFT Left 6/2/2021    US BREAST NEEDLE BIOPSY LEFT 6/2/2021 [facility] GENERAL SURGERY       Vitals 6/22/2022 6/10/2022 5/19/2022 5/16/2022 5/13/2022 7/99/7982   SYSTOLIC [phone]   DIASTOLIC 80 80 64 78 89 80   Site - - - Right Upper Arm - -   Position - - - Sitting - -   Cuff Size - - - Medium Adult - -   Pulse 77 83 86 72 99 94   Temp 96.8 - - - - 97.8   Resp 16 - - - - -   SpO2 98 - 98 - - 98   Weight 199 lb 200 lb 195 lb 14.4 oz - 192 lb -   Height 5' 5.5\" - - - 5' 5.5\" 5' 5\"   Body mass index 32.61 kg/m2 - - - 31.46 kg/m2 -   Pain Level - - - - - -   Some recent data might be hidden       Family History   Problem Relation Age of Onset    Hypertension Mother     No Known Problems Father     Thyroid Disease Sister     Cancer Maternal Great Grandmother         Unknown       Social History     Tobacco Use    Smoking status: Never Smoker    Smokeless tobacco: Never Used   Substance Use Topics    Alcohol use: Never      Current Outpatient Medications on File Prior to Visit   Medication Sig Dispense Refill    letrozole (FEMARA) 2.5 MG tablet Take 1 tablet by mouth daily 30 tablet 3    apixaban (ELIQUIS) 5 MG TABS tablet Take 1 tablet by mouth 2 times daily 60 tablet 5    vitamin D (ERGOCALCIFEROL) 1.25 MG (42273 UT) CAPS capsule Take 1 capsule by mouth once a week (Patient taking differently: Take 50,000 Units by mouth once a week Indications:  Wednesday ) 12 capsule 1    Multiple Vitamin (MULTIVITAMIN ADULT PO) Take 1 tablet by mouth daily       lisinopril (PRINIVIL;ZESTRIL) 5 MG tablet Take 1/2 (one-half) tablet by mouth once daily (Patient taking differently: Take 2.5 mg by mouth daily ) 45 tablet 3    Ascorbic Acid (VITAMIN C) 500 MG CAPS Take 500 mg by mouth daily       Fluticasone Propionate (FLONASE ALLERGY RELIEF NA)       cetirizine (ZYRTEC) 10 MG tablet Take 10 mg by mouth daily      Probiotic Product (PROBIOTIC ADVANCED) CAPS Take 1 capsule by mouth daily       No current facility-administered medications on file prior to visit. No Known Allergies    Health Maintenance   Topic Date Due    HIV screen  Never done    Hepatitis C screen  Never done    Colorectal Cancer Screen  Never done    DTaP/Tdap/Td vaccine (2 - Td or Tdap) 04/03/2019    COVID-19 Vaccine (3 - Booster for Moderna series) 08/01/2021    Shingles vaccine (1 of 2) Never done    Flu vaccine (Season Ended) 09/01/2022    Depression Screen  05/10/2023    Breast cancer screen  05/16/2023    Lipids  04/13/2026    Hepatitis A vaccine  Aged Out    Hepatitis B vaccine  Aged Out    Hib vaccine  Aged Out    Meningococcal (ACWY) vaccine  Aged Out    Pneumococcal 0-64 years Vaccine  Aged Out       Subjective:      Review of Systems   Constitutional: Positive for unexpected weight change. HENT: Negative. Eyes: Negative. Respiratory: Negative. Cardiovascular: Negative. Gastrointestinal: Negative. Genitourinary: Negative. Musculoskeletal: Negative. Skin: Negative. Neurological: Negative. Psychiatric/Behavioral: Negative. Objective:     Physical Exam  Vitals and nursing note reviewed. Constitutional:       Appearance: Normal appearance. She is well-developed. She is obese. HENT:      Head: Normocephalic. Right Ear: Tympanic membrane and external ear normal.      Left Ear: Tympanic membrane and external ear normal.      Nose: Nose normal.   Eyes:      Pupils: Pupils are equal, round, and reactive to light. Cardiovascular:      Rate and Rhythm: Normal rate and regular rhythm. Pulses: Normal pulses. Heart sounds: Normal heart sounds. Pulmonary:      Effort: Pulmonary effort is normal.      Breath sounds: Normal breath sounds.    Abdominal:      General: Bowel sounds are normal.   Genitourinary:     Comments: Declined gu exam and breast exam due to recent exam  Musculoskeletal: General: Normal range of motion. Cervical back: Normal range of motion. Skin:     General: Skin is warm and dry. Capillary Refill: Capillary refill takes less than 2 seconds. Neurological:      General: No focal deficit present. Mental Status: She is alert and oriented to person, place, and time. Mental status is at baseline. Psychiatric:         Mood and Affect: Mood normal.         Behavior: Behavior normal.         Thought Content: Thought content normal.         Judgment: Judgment normal.       /80   Pulse 77   Temp 96.8 °F (36 °C) (Temporal)   Resp 16   Ht 5' 5.5\" (1.664 m)   Wt 199 lb (90.3 kg)   LMP 2021 Comment: 2022  SpO2 98%   Breastfeeding No   BMI 32.61 kg/m²     Assessment:       Diagnosis Orders   1. Well adult health check     2. Screening for colon cancer  Fecal DNA Colorectal cancer screening (Cologuard)   3. Intraductal carcinoma in situ of left breast     4. Bilateral pulmonary embolism (HCC)           Plan:     PDMP Monitoring:    Last PDMP Jesus Alberto as Reviewed [facility]):  Review User Review Instant Review Result            Urine Drug Screenings (1 yr)    No resulted procedures found. Medication Contract and Consent for Opioid Use Documents Filed      No documents found                 Patient given educational materials -see patient instructions. Discussed use, benefit, and side effects of prescribed medications. All patient questions answered. Pt voiced understanding. Reviewed health maintenance. Instructed to continue currentmedications, diet and exercise. Patient agreed with treatment plan. Follow up as directed.    MEDICATIONS:  Orders Placed This Encounter   Medications    liraglutide-weight management 18 MG/3ML SOPN     Si.6mg SQ nightly x 1 week, then increase to 1.2mg SQ HS on week 2, on week 3 increase to 1.8mg SQ HS, see doctor after     Dispense:  5 pen     Refill:  2    ondansetron (ZOFRAN) 4 MG tablet     Sig: Take 1 tablet by mouth 3 times daily as needed for Nausea or Vomiting     Dispense:  30 tablet     Refill:  0         ORDERS:  Orders Placed This Encounter   Procedures    Fecal DNA Colorectal cancer screening (Cologuard)       Follow-up:  Return for 3 weeks f/u weight. PATIENT INSTRUCTIONS:  Patient Instructions   1. Be accountable for what you eat, lose it sree or my fitness pal will help you keep up with calories and exercise. [place] diet is best to follow Exercise regularly 3-5 x a week at least 30min at a time  Low carb , high protein. [id number]  2. Try the saxenda and do it nightly , get on the website and print off coupon. dont have to go up every week if needed. zofran if nausea  miralax daily if constipation  cologard and if positive send to GI      Electronically signed by ENRIQUE Casper CNP on 6/22/2022 at 4:55 PM    EMR Dragon/transcription disclaimer:  Much of thisencounter note is electronic transcription/translation of spoken language to printed texts. The electronic translation of spoken language may be erroneous, or at times, nonsensical words or phrases may be inadvertentlytranscribed.   Although I have reviewed the note for such errors, some may still exist.

## 2022-06-22 NOTE — PATIENT INSTRUCTIONS
1. Be accountable for what you eat, lose it sree or my fitness pal will help you keep up with calories and exercise. [place] diet is best to follow Exercise regularly 3-5 x a week at least 30min at a time  Low carb , high protein. [id number]  2. Try the saxenda and do it nightly , get on the website and print off coupon. dont have to go up every week if needed.    zofran if nausea  miralax daily if constipation  cologard and if positive send to GI

## 2022-07-08 LAB — NONINV COLON CA DNA+OCC BLD SCRN STL QL: NEGATIVE

## 2022-07-13 ENCOUNTER — OFFICE VISIT (OUTPATIENT)
Dept: PRIMARY CARE CLINIC | Age: 52
End: 2022-07-13
Payer: COMMERCIAL

## 2022-07-13 VITALS
OXYGEN SATURATION: 98 % | RESPIRATION RATE: 16 BRPM | TEMPERATURE: 96.8 F | HEIGHT: 66 IN | DIASTOLIC BLOOD PRESSURE: 80 MMHG | HEART RATE: 86 BPM | BODY MASS INDEX: 31.18 KG/M2 | SYSTOLIC BLOOD PRESSURE: 110 MMHG | WEIGHT: 194 LBS

## 2022-07-13 DIAGNOSIS — M62.89 PELVIC FLOOR DYSFUNCTION IN FEMALE: Primary | ICD-10-CM

## 2022-07-13 DIAGNOSIS — D05.12 INTRADUCTAL CARCINOMA IN SITU OF LEFT BREAST: ICD-10-CM

## 2022-07-13 PROCEDURE — 99213 OFFICE O/P EST LOW 20 MIN: CPT | Performed by: NURSE PRACTITIONER

## 2022-07-13 ASSESSMENT — PATIENT HEALTH QUESTIONNAIRE - PHQ9
SUM OF ALL RESPONSES TO PHQ QUESTIONS 1-9: 0
2. FEELING DOWN, DEPRESSED OR HOPELESS: 0
SUM OF ALL RESPONSES TO PHQ QUESTIONS 1-9: 0
1. LITTLE INTEREST OR PLEASURE IN DOING THINGS: 0
SUM OF ALL RESPONSES TO PHQ QUESTIONS 1-9: 0
SUM OF ALL RESPONSES TO PHQ9 QUESTIONS 1 & 2: 0
SUM OF ALL RESPONSES TO PHQ QUESTIONS 1-9: 0

## 2022-07-13 NOTE — PROGRESS NOTES
[facility] PHYSICIAN SERVICES  [facility]  [address]  [address] [address]  Dept: [phone]  Dept Fax: [phone]  Loc: [phone]    Bernard Corcoran is a 50 y.o. female who presents today for her medical conditions/complaints as noted below. Bernard Corcoran is c/o of Follow-up (patient presents today for 3 week follow up.)        HPI:     HPI   Chief Complaint   Patient presents with    Follow-up     patient presents today for 3 week follow up. Unfortunately she has had breast cancer and had a complete hysterectomy. She was wanting to do pelvic floor rehab but cannot find anyone local that is not on a waiting list.  She has been successful in losing weight. We started her on the [facility] [address] and her insurance is covering it. She feels like she is doing well on it and has been able to lose weight. She is down 5 pounds in the last 3 weeks.   Past Medical History:   Diagnosis Date    Bre[address] cancer ([facility]     Cancer ([facility] 2021    DCIS Left Breast      delivery delivered     Ductal carcinoma in situ (DCIS) of left breast 2021    History of D&C     1993    Hx of blood clots     Post op - left Mastectomy Eliquis    Hypertension       Past Surgical History:   Procedure Laterality Date    APPENDECTOMY      BREAST RECONSTRUCTION Left 2022     SECTION      DILATION AND CURETTAGE OF UTERUS      X2    HYSTERECTOMY (CERVIX STATUS UNKNOWN) Bilateral 2022    ROBOTIC TOTAL LAPAROSCOPIC HYSTERECTOMY, BILATERAL SALPINGO-OOPHORECTOMY, CYSTOSCOPY performed by Jaya Pisano MD at [address] [place] [address] Left 2021    LEFT NIPPLE SPARING MASTECTOMY VIA WISE PATTERN, TISSUE EXPANDER RECONSTRUCTION, PEC BLOCK performed by Erica Mcallister MD at [address] [place] [address] LEFT Left 2021    US BREAST NEEDLE BIOPSY LEFT 2021 [facility] GENERAL SURGERY       Vitals 2022 2022 6/10/2022 2022 5/16/2022 1/57/7939   SYSTOLIC 319 287 [phone]   DIASTOLIC 80 80 80 64 78 89   Site - - - - Right Upper Arm -   Position - - - - Sitting -   Cuff Size - - - - Medium Adult -   Pulse 86 77 83 86 72 99   Temp 96.8 96.8 - - - -   Resp 16 16 - - - -   SpO2 98 98 - 98 - -   Weight 194 lb 199 lb 200 lb 195 lb 14.4 oz - 192 lb   Height 5' 5.5\" 5' 5.5\" - - - 5' 5.5\"   Body mass index 31.79 kg/m2 32.61 kg/m2 - - - 31.46 kg/m2   Pain Level - - - - - -   Some recent data might be hidden       Family History   Problem Relation Age of Onset    Hypertension Mother     No Known Problems Father     Thyroid Disease Sister     Cancer Maternal Great Grandmother         Unknown       Social History     Tobacco Use    Smoking status: Never    Smokeless tobacco: Never   Substance Use Topics    Alcohol use: Never      Current Outpatient Medications on File Prior to Visit   Medication Sig Dispense Refill    ondansetron (ZOFRAN) 4 MG tablet Take 1 tablet by mouth 3 times daily as needed for Nausea or Vomiting 30 tablet 0    letrozole (FEMARA) 2.5 MG tablet Take 1 tablet by mouth daily 30 tablet 3    apixaban (ELIQUIS) 5 MG TABS tablet Take 1 tablet by mouth 2 times daily 60 tablet 5    vitamin D (ERGOCALCIFEROL) 1.25 MG (79977 UT) CAPS capsule Take 1 capsule by mouth once a week (Patient taking differently: Take 50,000 Units by mouth once a week Indications:  Wednesday ) 12 capsule 1    Multiple Vitamin (MULTIVITAMIN ADULT PO) Take 1 tablet by mouth daily       lisinopril (PRINIVIL;ZESTRIL) 5 MG tablet Take 1/2 (one-half) tablet by mouth once daily (Patient taking differently: Take 2.5 mg by mouth daily ) 45 tablet 3    Ascorbic Acid (VITAMIN C) 500 MG CAPS Take 500 mg by mouth daily       Fluticasone Propionate (FLONASE ALLERGY RELIEF NA)       cetirizine (ZYRTEC) 10 MG tablet Take 10 mg by mouth daily      Probiotic Product (PROBIOTIC ADVANCED) CAPS Take 1 capsule by mouth daily       No current facility-administered medications on file prior to visit. No Known Allergies    Health Maintenance   Topic Date Due    HIV screen  Never done    Hepatitis C screen  Never done    DTaP/Tdap/Td vaccine (2 - Td or Tdap) 04/03/2019    COVID-19 Vaccine (3 - Booster for Moderna series) 08/01/2021    Shingles vaccine (1 of 2) Never done    Flu vaccine (1) 09/01/2022    Breast cancer screen  05/16/2023    Depression Screen  07/13/2023    Colorectal Cancer Screen  07/05/2025    Lipids  04/13/2026    Hepatitis A vaccine  Aged Out    Hepatitis B vaccine  Aged Out    Hib vaccine  Aged Out    Meningococcal (ACWY) vaccine  Aged Out    Pneumococcal 0-64 years Vaccine  Aged Out       Subjective:      Review of Systems   Constitutional: Negative. HENT: Negative. Genitourinary:         Pelvic floor and abd relaxed muscle from surgery   Psychiatric/Behavioral:  The patient is nervous/anxious. Objective:     Physical Exam  Constitutional:       Appearance: Normal appearance. She is well-developed. HENT:      Head: Normocephalic. Eyes:      Conjunctiva/sclera: Conjunctivae normal.      Pupils: Pupils are equal, round, and reactive to light. Pulmonary:      Effort: Pulmonary effort is normal.   Musculoskeletal:      Cervical back: Normal range of motion. Skin:     General: Skin is warm and dry. Capillary Refill: Capillary refill takes less than 2 seconds. Neurological:      General: No focal deficit present. Mental Status: She is alert and oriented to person, place, and time. Psychiatric:         Mood and Affect: Mood normal.         Behavior: Behavior normal.         Thought Content: Thought content normal.         Judgment: Judgment normal.     /80   Pulse 86   Temp 96.8 °F (36 °C) (Temporal)   Resp 16   Ht 5' 5.5\" (1.664 m)   Wt 194 lb (88 kg)   LMP 03/26/2021 Comment: 4/28/2022  SpO2 98%   Breastfeeding No   BMI 31.79 kg/m²     Assessment:       Diagnosis Orders   1. Pelvic floor dysfunction in female        2. Intraductal carcinoma in situ of left breast              Plan:   More than 50% of the time was spent counseling and coordinating care for a total time of 20min face to face. I called her on several places to see if they had rehab for pelvic floor and all of them for at least 8 weeks out. We discussed exercises she could do for helping with her abdominal muscles and her pelvic floor. See the plan below for weight loss  PDMP Monitoring:    Last PDMP Jesus Alberto as Reviewed [facility]):  Review User Review Instant Review Result            Urine Drug Screenings (1 yr)    No resulted procedures found. Medication Contract and Consent for Opioid Use Documents Filed        No documents found                     Patient given educational materials -see patient instructions. Discussed use, benefit, and side effects of prescribed medications. All patient questions answered. Pt voiced understanding. Reviewed health maintenance. Instructed to continue currentmedications, diet and exercise. Patient agreed with treatment plan. Follow up as directed. MEDICATIONS:  Orders Placed This Encounter   Medications    liraglutide-weight management 18 MG/3ML SOPN     Si.8mg  SQ HS x 1 week then 2.4mg SQ HS x 1 week then 3.0mg SQ nightly     Dispense:  5 pen     Refill:  2         ORDERS:  No orders of the defined types were placed in this encounter. Follow-up:  Return in about 8 weeks (around 2022). PATIENT INSTRUCTIONS:  Patient Instructions   Continue the saxenda move up slowly  Use zofran as needed  Electronically signed by ENRIQUE Jean-Baptiste CNP on 2022 at 5:08 PM    EMR Dragon/transcription disclaimer:  Much of thisencounter note is electronic transcription/translation of spoken language to printed texts. The electronic translation of spoken language may be erroneous, or at times, nonsensical words or phrases may be inadvertentlytranscribed.   Although I have reviewed the note for such errors, some may still exist.

## 2022-08-31 RX ORDER — ERGOCALCIFEROL 1.25 MG/1
50000 CAPSULE ORAL WEEKLY
Qty: 12 CAPSULE | Refills: 1 | Status: SHIPPED | OUTPATIENT
Start: 2022-08-31

## 2022-09-08 DIAGNOSIS — D05.12 INTRADUCTAL CARCINOMA IN SITU OF LEFT BREAST: ICD-10-CM

## 2022-09-08 RX ORDER — LETROZOLE 2.5 MG/1
TABLET, FILM COATED ORAL
Qty: 30 TABLET | Refills: 0 | Status: SHIPPED | OUTPATIENT
Start: 2022-09-08 | End: 2022-10-11

## 2022-09-14 ENCOUNTER — OFFICE VISIT (OUTPATIENT)
Dept: PRIMARY CARE CLINIC | Age: 52
End: 2022-09-14
Payer: COMMERCIAL

## 2022-09-14 VITALS
HEART RATE: 86 BPM | BODY MASS INDEX: 31.65 KG/M2 | RESPIRATION RATE: 16 BRPM | DIASTOLIC BLOOD PRESSURE: 60 MMHG | TEMPERATURE: 97.2 F | OXYGEN SATURATION: 99 % | WEIGHT: 190 LBS | HEIGHT: 65 IN | SYSTOLIC BLOOD PRESSURE: 100 MMHG

## 2022-09-14 DIAGNOSIS — E66.9 OBESITY (BMI 30.0-34.9): ICD-10-CM

## 2022-09-14 DIAGNOSIS — D05.12 INTRADUCTAL CARCINOMA IN SITU OF LEFT BREAST: Primary | ICD-10-CM

## 2022-09-14 PROCEDURE — 99213 OFFICE O/P EST LOW 20 MIN: CPT | Performed by: NURSE PRACTITIONER

## 2022-09-14 RX ORDER — LORATADINE 10 MG/1
10 CAPSULE, LIQUID FILLED ORAL DAILY
COMMUNITY

## 2022-09-14 SDOH — ECONOMIC STABILITY: FOOD INSECURITY: WITHIN THE PAST 12 MONTHS, THE FOOD YOU BOUGHT JUST DIDN'T LAST AND YOU DIDN'T HAVE MONEY TO GET MORE.: NEVER TRUE

## 2022-09-14 SDOH — ECONOMIC STABILITY: FOOD INSECURITY: WITHIN THE PAST 12 MONTHS, YOU WORRIED THAT YOUR FOOD WOULD RUN OUT BEFORE YOU GOT MONEY TO BUY MORE.: NEVER TRUE

## 2022-09-14 ASSESSMENT — ENCOUNTER SYMPTOMS
RESPIRATORY NEGATIVE: 1
GASTROINTESTINAL NEGATIVE: 1
EYES NEGATIVE: 1

## 2022-09-14 ASSESSMENT — SOCIAL DETERMINANTS OF HEALTH (SDOH): HOW HARD IS IT FOR YOU TO PAY FOR THE VERY BASICS LIKE FOOD, HOUSING, MEDICAL CARE, AND HEATING?: NOT HARD AT ALL

## 2022-09-14 NOTE — PROGRESS NOTES
[facility] PHYSICIAN SERVICES  [facility]  [address] [place] [address]  [address]   Dept: [phone]  Dept Fax: [phone]  Loc: [phone]    Mason Campuzano is a 50 y.o. female who presents today for her medical conditions/complaints as noted below. Mason Campuzano is c/o of Follow-up (Patient presents today for follow up from 22)        HPI:     HPI   Chief Complaint   Patient presents with    Follow-up     Patient presents today for follow up from 22   She is taken [facility] for weight loss. She is lost about 12 pounds total.  She did go off to take care of her daughter in another state let the pin here and did not take it for some time. She would like to get back to 175 pounds.   Past Medical History:   Diagnosis Date    Breast cancer ([facility] [address]     Cancer ([facility] [address] 2021    DCIS Left Breast      delivery delivered     Ductal carcinoma in situ (DCIS) of left breast 2021    History of D&C     1993    Hx of blood clots     Post op  left Mastectomy Eliquis    Hypertension       Past Surgical History:   Procedure Laterality Date    APPENDECTOMY      BREAST RECONSTRUCTION Left 2022     SECTION      DILATION AND CURETTAGE OF UTERUS      X2    HYSTERECTOMY (CERVIX STATUS UNKNOWN) Bilateral 2022    ROBOTIC TOTAL LAPAROSCOPIC HYSTERECTOMY, BILATERAL SALPINGO-OOPHORECTOMY, CYSTOSCOPY performed by Morgan Faria MD at [facility] Left 2021    LEFT NIPPLE SPARING MASTECTOMY VIA WISE PATTERN, TISSUE EXPANDER RECONSTRUCTION, PEC BLOCK performed by Juana Ferrell MD at [address] [place] [address] LEFT Left 2021    US BREAST NEEDLE BIOPSY LEFT 2021 [facility] GENERAL SURGERY       Vitals 2022 2022 2022 6/10/2022 2022 [id number]   SYSTOLIC 219 956 [phone]   DIASTOLIC 60 80 80 80 64 78   Site - - - - - Right Upper Arm   Position - - - - - Sitting   Cuff Size - - - - - Medium Adult   Pulse 86 86 77 83 86 72   Temp 97.2 96.8 96.8 - - -   Resp 16 16 16 - - -   SpO2 99 98 98 - 98 -   Weight 190 lb 194 lb 199 lb 200 lb 195 lb 14.4 oz -   Height 5' 5\" 5' 5.5\" 5' 5.5\" - - -   Body mass index 31.61 kg/m2 31.79 kg/m2 32.61 kg/m2 - - -   Pain Level - - - - - -   Some recent data might be hidden       Family History   Problem Relation Age of Onset    Hypertension Mother     No Known Problems Father     Thyroid Disease Sister     Cancer Maternal Great Grandmother         Unknown       Social History     Tobacco Use    Smoking status: Never    Smokeless tobacco: Never   Substance Use Topics    Alcohol use: Never      Current Outpatient Medications on File Prior to Visit   Medication Sig Dispense Refill    loratadine (CLARITIN) 10 MG capsule Take 10 mg by mouth daily      letrozole (FEMARA) 2.5 MG tablet Take 1 tablet by mouth once daily 30 tablet 0    vitamin D (ERGOCALCIFEROL) 1.25 MG (10760 UT) CAPS capsule Take 1 capsule by mouth once a week Indications: Wednesday 12 capsule 1    liraglutide-weight management 18 MG/3ML SOPN 1.8mg  SQ HS x 1 week then 2.4mg SQ HS x 1 week then 3.0mg SQ nightly 5 pen 2    ondansetron (ZOFRAN) 4 MG tablet Take 1 tablet by mouth 3 times daily as needed for Nausea or Vomiting 30 tablet 0    apixaban (ELIQUIS) 5 MG TABS tablet Take 1 tablet by mouth 2 times daily 60 tablet 5    Multiple Vitamin (MULTIVITAMIN ADULT PO) Take 1 tablet by mouth daily       lisinopril (PRINIVIL;ZESTRIL) 5 MG tablet Take 1/2 (one-half) tablet by mouth once daily (Patient taking differently: Take 2.5 mg by mouth daily) 45 tablet 3    Ascorbic Acid (VITAMIN C) 500 MG CAPS Take 500 mg by mouth daily       Fluticasone Propionate (FLONASE ALLERGY RELIEF NA)       Probiotic Product (PROBIOTIC ADVANCED) CAPS Take 1 capsule by mouth daily       No current facility-administered medications on file prior to visit.      No Known Allergies    Health Maintenance   Topic Date Due    HIV screen  Never done    Hepatitis C screen  Never done    DTaP/Tdap/Td vaccine (2 - Td or Tdap) 04/03/2019    COVID-19 Vaccine (3 - Booster for Moderna series) 08/01/2021    Shingles vaccine (1 of 2) Never done    Flu vaccine (1) Never done    Breast cancer screen  05/16/2023    Depression Screen  07/13/2023    Colorectal Cancer Screen  07/05/2025    Lipids  04/13/2026    Hepatitis A vaccine  Aged Out    Hepatitis B vaccine  Aged Out    Hib vaccine  Aged Out    Meningococcal (ACWY) vaccine  Aged Out    Pneumococcal 0-64 years Vaccine  Aged Out       Subjective:      Review of Systems   Constitutional: Negative. HENT: Negative. Eyes: Negative. Respiratory: Negative. Cardiovascular: Negative. Gastrointestinal: Negative. Genitourinary: Negative. Musculoskeletal: Negative. Skin: Negative. Neurological: Negative. Psychiatric/Behavioral: Negative. Objective:     Physical Exam  Vitals and nursing note reviewed. Constitutional:       Appearance: Normal appearance. She is well-developed. HENT:      Head: Normocephalic. Eyes:      Pupils: Pupils are equal, round, and reactive to light. Cardiovascular:      Rate and Rhythm: Normal rate and regular rhythm. Heart sounds: Normal heart sounds. Pulmonary:      Effort: Pulmonary effort is normal.      Breath sounds: Normal breath sounds. Musculoskeletal:      Cervical back: Normal range of motion. Skin:     General: Skin is warm and dry. Capillary Refill: Capillary refill takes less than 2 seconds. Neurological:      General: No focal deficit present. Mental Status: She is alert and oriented to person, place, and time. Mental status is at baseline. Psychiatric:         Mood and Affect: Mood normal.         Behavior: Behavior normal.         Thought Content:  Thought content normal.         Judgment: Judgment normal.     /60   Pulse 86   Temp 97.2 °F (36.2 °C) (Temporal)   Resp 16   Ht 5' 5\" (1.651 m)   Wt 190 lb (86.2 kg)   LMP 03/26/2021 Comment: 4/28/2022  SpO2 99%   BMI 31.62 kg/m²     Assessment:       Diagnosis Orders   1. Intraductal carcinoma in situ of left breast        2. Obesity (BMI 30.0-34. 9)              Plan:   More than 50% of the time was spent counseling and coordinating care for a total time of 25min face to face. PDMP Monitoring:    Last PDMP Jesus Alberto as Reviewed:  Review User Review Instant Review Result            Urine Drug Screenings (1 yr)    No resulted procedures found. Medication Contract and Consent for Opioid Use Documents Filed        No documents found                     Patient given educational materials -see patient instructions. Discussed use, benefit, and side effects of prescribed medications. All patient questions answered. Pt voiced understanding. Reviewed health maintenance. Instructed to continue currentmedications, diet and exercise. Patient agreed with treatment plan. Follow up as directed. MEDICATIONS:  Orders Placed This Encounter   Medications    liraglutide-weight management 18 MG/3ML SOPN     Sig: Inject 3 mg into the skin daily     Dispense:  5 Adjustable Dose Pre-filled Pen Syringe     Refill:  3     Dispense enough for 3mg a day x 30 days           ORDERS:  No orders of the defined types were placed in this encounter. Follow-up:  No follow-ups on file. PATIENT INSTRUCTIONS:  Patient Instructions   1. Be accountable for what you eat, lose it sree or my fitness pal will help you keep up with calories and exercise. [place] diet is best to follow Exercise regularly 3-5 x a week at least 30min at a time   Electronically signed by ENRIQUE Voss CNP on 9/14/2022 at 5:14 PM    EMR Dragon/transcription disclaimer:  Much of thisencounter note is electronic transcription/translation of spoken language to printed texts.   The electronic translation of spoken language may be erroneous, or at times, nonsensical words or phrases may be inadvertentlytranscribed.   Although I have reviewed the note for such errors, some may still exist.

## 2022-09-30 ENCOUNTER — OFFICE VISIT (OUTPATIENT)
Dept: HEMATOLOGY | Age: 52
End: 2022-09-30
Payer: COMMERCIAL

## 2022-09-30 ENCOUNTER — HOSPITAL ENCOUNTER (OUTPATIENT)
Dept: INFUSION THERAPY | Age: 52
Discharge: HOME | End: 2022-09-30
Payer: COMMERCIAL

## 2022-09-30 VITALS
SYSTOLIC BLOOD PRESSURE: 132 MMHG | WEIGHT: 190.4 LBS | HEART RATE: 101 BPM | DIASTOLIC BLOOD PRESSURE: 78 MMHG | BODY MASS INDEX: 31.68 KG/M2 | OXYGEN SATURATION: 98 %

## 2022-09-30 DIAGNOSIS — I26.99 BILATERAL PULMONARY EMBOLISM (HCC): Primary | ICD-10-CM

## 2022-09-30 DIAGNOSIS — D05.12 INTRADUCTAL CARCINOMA IN SITU OF LEFT BREAST: ICD-10-CM

## 2022-09-30 DIAGNOSIS — D05.12 INTRADUCTAL CARCINOMA IN SITU OF LEFT BREAST: Primary | ICD-10-CM

## 2022-09-30 DIAGNOSIS — Z51.81 ENCOUNTER FOR MONITORING ADJUVANT HORMONAL THERAPY: ICD-10-CM

## 2022-09-30 DIAGNOSIS — Z78.0 POSTMENOPAUSAL: ICD-10-CM

## 2022-09-30 DIAGNOSIS — Z79.899 ENCOUNTER FOR MONITORING ADJUVANT HORMONAL THERAPY: ICD-10-CM

## 2022-09-30 DIAGNOSIS — M79.89 LEFT AXILLARY SWELLING: ICD-10-CM

## 2022-09-30 LAB
BASOPHILS ABSOLUTE: 0.07 K/UL (ref 0.01–0.08)
BASOPHILS RELATIVE PERCENT: 1 % (ref 0.1–1.2)
EOSINOPHILS ABSOLUTE: 0.26 K/UL (ref 0.04–0.54)
EOSINOPHILS RELATIVE PERCENT: 3.7 % (ref 0.7–7)
HCT VFR BLD CALC: 40.9 % (ref 34.1–44.9)
HEMOGLOBIN: 12.7 G/DL (ref 11.2–15.7)
LYMPHOCYTES ABSOLUTE: 1.75 K/UL (ref 1.18–3.74)
LYMPHOCYTES RELATIVE PERCENT: 24.6 % (ref 19.3–53.1)
MCH RBC QN AUTO: 31.1 PG (ref 25.6–32.2)
MCHC RBC AUTO-ENTMCNC: 31.1 G/DL (ref 32.3–35.5)
MCV RBC AUTO: 100.2 FL (ref 79.4–94.8)
MONOCYTES ABSOLUTE: 0.45 K/UL (ref 0.24–0.82)
MONOCYTES RELATIVE PERCENT: 6.3 % (ref 4.7–12.5)
NEUTROPHILS ABSOLUTE: 4.56 K/UL (ref 1.56–6.13)
NEUTROPHILS RELATIVE PERCENT: 64.3 % (ref 34–71.1)
PDW BLD-RTO: 13.2 % (ref 11.7–14.4)
PLATELET # BLD: 287 K/UL (ref 182–369)
PMV BLD AUTO: 9.7 FL (ref 7.4–10.4)
RBC # BLD: 4.08 M/UL (ref 3.93–5.22)
WBC # BLD: 7.1 K/UL (ref 3.98–10.04)

## 2022-09-30 PROCEDURE — 36415 COLL VENOUS BLD VENIPUNCTURE: CPT

## 2022-09-30 PROCEDURE — 99214 OFFICE O/P EST MOD 30 MIN: CPT | Performed by: NURSE PRACTITIONER

## 2022-09-30 PROCEDURE — 85025 COMPLETE CBC W/AUTO DIFF WBC: CPT

## 2022-09-30 PROCEDURE — 99212 OFFICE O/P EST SF 10 MIN: CPT

## 2022-09-30 NOTE — PROGRESS NOTES
Progress Note      Pt Name: Imelda Chavez: 1970  MRN: [id number]    Date of evaluation: 09/30/2022  History Obtained From:  patient, electronic medical record    CHIEF COMPLAINT:    Chief Complaint   Patient presents with    Follow-up     Intraductal carcinoma in situ of left breast    Other     Anticoagulation with Eliquis     HISTORY OF PRESENT ILLNESS:    Adolph Sotomayor is a 50 y.o.  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 Castillo Osorio was initiated on preventive endocrine therapy with tamoxifen 20 mg daily on 6/21/2021 followed by left nipple sparing mastectomy with immediate tissue expander reconstruction on 7/27/2021. She presented to [facility] ED on 8/6/2021 right sharp pleuritic chest discomfort and shortness of breath. CTA of the chest revealed bilateral pulmonary embolus with no evidence of right heart strain and she was initiated on heparin drip and then transitioned to Eliquis on 8/8/2021. She had a total laparoscopic hysterectomy and bilateral salpingo-oophorectomy on 4/28/2022. Recommendation was to continue with anticoagulation until she had completed all of her breast reconstruction procedures and preventive therapy with Femara 2.5 mg daily for 5 years. Castillo Osorio returns today in scheduled follow-up for evaluation, lab monitoring, side effect monitoring and further treatment recommendations. She reports being compliant with the Femara 2.5 mg daily. Castillo Osorio reports that she is scheduled for her last reconstructive procedure on 10/20/2022. Today's clinic visit to include physical assessment, review of systems, any radiograph or lab findings that were available and plan of care are documented below.         ONCOLOGIC HISTORY:  Ductal Carcinoma in Situ of the left breast, ER 94%, stage 0, pTis, pNx, pMx  Castillo Osorio was seen in initial medical oncology and hematology consultation on 8/7/2021 regarding bilateral pulmonary emboli in the setting of a recent left mastectomy for DCIS. Florestine Appl established care and [facility] A Pap smear was completed and she was incidentally noted to have an elevated blood pressure and placed on lisinopril with recommendations for close monitoring of BP. Orders were also given for routine screening arrangements were made for bilateral mammogram.     Bilateral mammogram at [facility] on 4/19/2021  Left breast with multiple groups of calcifications, which have increased in size and number compared to 07/14/2014. No mammographic evidence of malignancy in the right breast.      Left mammogram with CAD at \[facility]\"" on 4/23/2021  Multiple similar appearing groups of indeterminate calcifications in   the left breast. Recommend stereotactic left breast biopsy. Ultrasound-guided left breast biopsy at [facility] on 4/30/2021   Pathology indicating atypical ductal hyperplasia. Tissue was sent to Dr. Sandee Cao for consultation and she reported \"the changes are concerning for low-grade ductal carcinoma in situ, but are insufficiently developed to establish the diagnosis and the sample. Final classification should be made on excisional biopsy specimen\". MRI of bilateral breast on 5/26/2021 at [address] [place] [address] [facility] 8.5 x 3.3 x 5.4 cm area of regional non-mass enhancement in the LEFT upper outer breast, with differential diagnosis including DCIS. No suspicious focal mass in either breast. No focal mass adjacentto the LEFT breast biopsy clip marker. No axillary or internal mammary lymphadenopathy      Harshal Rhodes was seen in initial consultation by KATHLEEN Smart on 5/21/2021 followed by an appointment with Dr. Jaida Grubbs on 5/27/2021. Left breast biopsy on 6/2/2021 by Dr. Jaida Grubbs  Micropapillary ductal carcinoma in situ.    Negative for invasion  ER is positive at 94%. Seen by Dr. Raul Waite on 6/21/2021 and initiated on preventive tamoxifen 20 mg PO. Left nipple sparing mastectomy with immediate tissue expander reconstruction on 7/27/2021 by Dr. Raul Waite  Pathology:   1. Extensive low-grade ductal carcinoma in situ (micropapillary and cribriform types). 2.  In situ carcinoma demonstrates extensive intraluminal mucin production. 3. In situ carcinoma measures at least 8.0 cm in greatest dimension. 4. In situ carcinoma extends to within 0.1 cm of the deep surgical excision margin and 0.4 cm of the anterior margin. 5.  Negative for invasive carcinoma. AJCC STAGE: pTis, pNx, pMx      Dr. Dario Llanes discussed the following with Dr. Catherine Arteaga who is in agreement with holding tamoxifen until after completion of left breast reconstruction. At that time if declared postmenopausal can consider initiating preventative Femara or will consider resuming tamoxifen. Tamoxifen was started in the preventative setting. This of course is to prevent new disease developments over the future years in the right breast.  Although Joseph Rogel will be on anticoagulation, she does have future surgeries scheduled for reconstruction. Dr. Dario Llanes prefered that she be off of tamoxifen which can be a prothrombotic agent until after all of that has been accomplished. Recommend stopping Tamoxifen due to thrombotic risk factor on 8/7/2021  The issue of preventive therapy will be addressed after completion of left breast reconstruction. At that time, if declared postmenopausal will consider initiating preventative Femara. Reports irregular menstrual cycles and the last menstrual cycle was in March 2021. DIP flap reconstruction on 02//22/2022 per Dr Mono Head. She did experience some postoperative bleeding which led to a drop in hemoglobin and she was placed on ferrous sulfate 325 mg 3 times a day by Dr. Mono Head. IUD removed on 1/14/2022.   She has decided to move forward with hysterectomy now versus in the fall and is seeing Dr Mirela Guajardo and planning on hysterectomy in April or May 2022.     04/28/2022- Total laparoscopic hysterectomy and bilateral salpingo-oophorectomy. Pathology benign. 05/16/2022 Bilateral mammogram- no mammographic evidence of malignancy; left mastectomy with ROSY reconstruction      Treatment summary:  Initiated on preventative tamoxifen 20 mg on 6/21/2021  Left nipple sparing mastectomy with immediate tissue expander reconstruction on 7/27/2021 by Dr. Hao Pugh  Preventative tamoxifen held on 8/7/2021 5/19/2022-initiate adjuvant endocrine therapy with Femara 2.5 mg daily      HEMATOLOGIC HISTORY:  Provoked bilateral pulmonary emboli, 8/6/2021 (suspect secondary to tamoxifen and surgical intervention)  Delfina Le was admitted to [facility] to the ED on 8/6/2021 with bilateral pulmonary emboli. Delfina Le presented to [address] [place] [address] ED with right-sided sharp pleuritic chest discomfort over the previous 12-24 hours. The pain was a stabbing knifelike sensation    CTA of the chest with contrast on 8/6/2021  Small to moderate amount of pulmonary embolus greatest in the right lower lobe pulmonary artery branches. There is bilateral pulmonary embolus. No definite CT evidence of right heart strain. Mild cardiomegaly. Focal parenchymal consolidation in the right lower lobe with surrounding groundglass opacity in the same distribution as thegreatest amount of pulmonary embolus. This may represent a pulmonary infarct. PT 14.0/INR 1.06 and aPTT 21.3 on 8/6/2021  COVID-19, rapid negative on 8/6/2021     Delfina Le was placed on on heparin drip and admitted for further evaluation and recommendations. Noninvasive venous study of bilateral lower extremities  on 8/7/2021 with no evidence of DVT, SVT or reflux.     Serology studies on 8/7/2021  Anticardiolipin IgA 1.5, IgG 1.3, IgM 3.8 -negative  Beta 2 Glycoprotein IgG 0, IgM 2 (0-20)  Cardiolipin antibodies IgG <10, IgM <10 (0-14)  Protein C Ag >95 (%)  Protein S Ag 107 (%)  Antithrombin activity 92 ()  Antithrombin Ag 76 ()  Lupus anticoagulant-  not detected  Factor V Leiden - negative  Prothrombin gene mutation - negative  MTHFR: compound heterozygous Mutation C677T and Mutation [id number] (may be associated with a mild decrease in MTHFR enzyme activity although clinically insignificant and most likely not a factor recent finding of pulmonary emboli)   Homocysteine 5 (0-15)    Heparin discontinued and initiated Eliquis 10 mg p.o. twice daily x7 days followed by Eliquis 5 mg p.o. twice daily on 2021. JAK2 V617F flex to CA LR/exon 12-15/MPL was negative on 2021 CTA Chest- No evidence of embolic disease.  There is no residual from a emboli  described 2021.    2021 FSH- 77.2 (postmenopause)    Age Appropriate Health Screenin2022- Cologuard- negative  2022 Bone Density- normal; lumbar spine T score -0.7; left femoral neck T score -0.1    Past Medical History:    Past Medical History:   Diagnosis Date    Breast cancer ([facility] [address]     Cancer ([facility] [address] 2021    DCIS Left Breast      delivery delivered     Ductal carcinoma in situ (DCIS) of left breast 2021    History of D&C     1993    Hx of blood clots     Post op  left Mastectomy Eliquis    Hypertension        Past Surgical History:    Past Surgical History:   Procedure Laterality Date    APPENDECTOMY      BREAST RECONSTRUCTION Left 2022     SECTION      DILATION AND CURETTAGE OF UTERUS      X2    HYSTERECTOMY (CERVIX STATUS UNKNOWN) Bilateral 2022    ROBOTIC TOTAL LAPAROSCOPIC HYSTERECTOMY, BILATERAL SALPINGO-OOPHORECTOMY, CYSTOSCOPY performed by Padmaja Santiago MD at [facility] Left 2021    LEFT NIPPLE SPARING MASTECTOMY VIA WISE PATTERN, TISSUE EXPANDER RECONSTRUCTION, PEC BLOCK performed by Gianni Barnett MD at [address] [place] [address] OVARY REMOVAL      US BREAST NEEDLE BIOPSY LEFT Left 6/2/2021    US BREAST NEEDLE BIOPSY LEFT 6/2/2021 [facility] GENERAL SURGERY       Current Medications:    Current Outpatient Medications   Medication Sig Dispense Refill    loratadine (CLARITIN) 10 MG capsule Take 10 mg by mouth daily      liraglutide-weight management 18 MG/3ML SOPN Inject 3 mg into the skin daily 5 Adjustable Dose Pre-filled Pen Syringe 3    letrozole (FEMARA) 2.5 MG tablet Take 1 tablet by mouth once daily 30 tablet 0    vitamin D (ERGOCALCIFEROL) 1.25 MG (88805 UT) CAPS capsule Take 1 capsule by mouth once a week Indications: Wednesday 12 capsule 1    ondansetron (ZOFRAN) 4 MG tablet Take 1 tablet by mouth 3 times daily as needed for Nausea or Vomiting 30 tablet 0    Multiple Vitamin (MULTIVITAMIN ADULT PO) Take 1 tablet by mouth daily       lisinopril (PRINIVIL;ZESTRIL) 5 MG tablet Take 1/2 (one-half) tablet by mouth once daily (Patient taking differently: Take 2.5 mg by mouth daily) 45 tablet 3    Ascorbic Acid (VITAMIN C) 500 MG CAPS Take 500 mg by mouth daily       Fluticasone Propionate (FLONASE ALLERGY RELIEF NA)       Probiotic Product (PROBIOTIC ADVANCED) CAPS Take 1 capsule by mouth daily      liraglutide-weight management 18 MG/3ML SOPN 1.8mg  SQ HS x 1 week then 2.4mg SQ HS x 1 week then 3.0mg SQ nightly 5 pen 2    apixaban (ELIQUIS) 5 MG TABS tablet Take 1 tablet by mouth 2 times daily 60 tablet 5     No current facility-administered medications for this visit.         Allergies: No Known Allergies    Social History:    Social History     Tobacco Use    Smoking status: Never    Smokeless tobacco: Never   Vaping Use    Vaping Use: Never used   Substance Use Topics    Alcohol use: Never    Drug use: Never       Family History:   Family History   Problem Relation Age of Onset    Hypertension Mother     No Known Problems Father     Thyroid Disease Sister     Cancer Maternal Great Grandmother         Unknown       Vitals:  Vitals:    09/30/22 1144   BP: 132/78   Pulse: (!) 101   SpO2: 98%   Weight: 190 lb 6.4 oz (86.4 kg)        Subjective   REVIEW OF SYSTEMS:   Review of Systems   Constitutional:  Positive for fatigue. Negative for chills, diaphoresis and fever. HENT: Negative. Negative for congestion, ear pain, hearing loss, nosebleeds, sore throat and tinnitus. Eyes: Negative. Negative for pain, discharge and redness. Respiratory: Negative. Negative for cough, shortness of breath and wheezing. Cardiovascular: Negative. Negative for chest pain, palpitations and leg swelling. Gastrointestinal: Negative. Negative for abdominal pain, blood in stool, constipation, diarrhea, nausea and vomiting. Endocrine: Positive for heat intolerance. Negative for polydipsia. Genitourinary:  Negative for dysuria, flank pain, frequency, hematuria and urgency. Musculoskeletal:  Positive for arthralgias. Negative for back pain, myalgias and neck pain. Skin: Negative. Negative for rash. Neurological: Negative. Negative for dizziness, tremors, seizures, weakness and headaches. Hematological:  Does not bruise/bleed easily. Psychiatric/Behavioral: Negative. The patient is not nervous/anxious. Objective   PHYSICAL EXAM:  Physical Exam  Vitals reviewed. Constitutional:       General: She is not in acute distress. Appearance: She is well-developed. HENT:      Head: Normocephalic and atraumatic. Mouth/Throat:      Pharynx: Uvula midline. Tonsils: No tonsillar exudate. Eyes:      General: Lids are normal.      Conjunctiva/sclera: Conjunctivae normal.      Pupils: Pupils are equal, round, and reactive to light. Neck:      Thyroid: No thyroid mass or thyromegaly. Vascular: No JVD. Trachea: Trachea normal. No tracheal deviation. Cardiovascular:      Rate and Rhythm: Normal rate and regular rhythm. Pulses: Normal pulses. Heart sounds: Normal heart sounds.    Pulmonary:      Effort: Pulmonary effort is normal. No respiratory distress. Breath sounds: Normal breath sounds. No wheezing or rales. Chest:      Chest wall: No tenderness. Abdominal:      General: Bowel sounds are normal. There is no distension. Palpations: Abdomen is soft. There is no mass. Tenderness: There is no abdominal tenderness. There is no guarding. Musculoskeletal:         General: No tenderness or deformity. Cervical back: Normal range of motion and neck supple. Comments: Range of motion within normal limits x4 extremities   Lymphadenopathy:      Comments: Left axillary edema noted   Skin:     General: Skin is warm. Findings: No bruising, erythema or rash. Neurological:      Mental Status: She is alert and oriented to person, place, and time. Cranial Nerves: No cranial nerve deficit. Coordination: Coordination normal.   Psychiatric:         Behavior: Behavior normal.         Thought Content: Thought content normal.     Labs reviewed today:  Lab Results   Component Value Date    WBC 7.10 09/30/2022    HGB 12.7 09/30/2022    HCT 40.9 09/30/2022    .2 (H) 09/30/2022     09/30/2022     Lab Results   Component Value Date    NEUTROABS 4.56 09/30/2022         ASSESSMENT/PLAN:      1. 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 Current recommendation is for preventive endocrine therapy with Femara 2.5 mg daily for 5 years, through May 2027. Reports compliant with letrozole. Complains of increased swelling/lymph node under left axillary, area is mildly edematous. Anticipating last reconstruction procedure on 10/20/2022 per Dr Tammie Banda.     05/16/2022 Bilateral mammogram- no mammographic evidence of malignancy; left mastectomy with ROSY reconstruction    -Continue preventive Femara 2.5 mg p.o. daily for anticipated 5 years  -Follow-up with Dr. Tammie Banda as recommended-last reconstruction surgery is scheduled for 10/20/2022  -Schedule ultrasound of left axillary for further evaluation    Bone health:  06/02/2022 Bone Density- normal; lumbar spine T score -0.7; left femoral neck T score -0.1    Vitamin D level of 57.1 on 05/19/2022    -Recommend calcium 1200 mg with vitamin D 1000 units daily    2. Encounter for monitoring letrozole/Femara  -Reports hot flashes are currently tolerable.  -Noted hair thinning  -Increased arthralgias since initiating      3. Provoked bilateral pulmonary embolism, 8/6/2021. JAK2 V617F flex to CA LR/exon 12-15/MPL was negative on 9/1/2021. Reports compliant with Eliquis and tolerating without difficulty. 04/269/2022 CTA pulmonary with contrast- Less than optimal opacification of the pulmonary arteries. No visible pulmonary embolus. Main pulmonary artery segment upper limits of normal in size measuring 2.9 cm. Ascending thoracic aorta is dilated at 4 cm. Heart size is prominent. Linear atelectasis in both lung bases. -Continue Eliquis 5 mg p.o. twice daily, plan to continue anticoagulation till has completed all surgical interventions, next breast reconstruction    I discussed all of the above findings included in the assessment and plan with the patient and the patient is in agreement to move forward with current recommendations/treatment. I have addressed all of their questions and concerns that were verbalized. FOLLOW UP:  Follow-up appointment given for 2 months or sooner if needed  Continue to follow with other medical providers as recommended. Labs at next visit: CBC and CMP    EMR Dragon/Transcription disclaimer:   Much of this encounter note is an electronic transcription/translation of spoken language to printed text.  The electronic translation of spoken language may permit erroneous, or at times, nonsensical words or phrases to be inadvertently transcribed; although attempts have made to review the note for such errors, some may still exist.  Please excuse any unrecognized transcription errors and contact us if the error is unintelligible or needs documented correction. Also, portions of this note have been copied forward, however, changed to reflect the most current clinical status of this patient. I, Randy Lucas, elias pre-charting as a registered nurse for ENRI[facility] Ruvalc[employer].   Electronically signed by ENRIQUE Mei on 10/5/2022 at 7:35 PM

## 2022-10-05 ASSESSMENT — ENCOUNTER SYMPTOMS
DIARRHEA: 0
EYE PAIN: 0
COUGH: 0
NAUSEA: 0
EYE DISCHARGE: 0
VOMITING: 0
WHEEZING: 0
BLOOD IN STOOL: 0
GASTROINTESTINAL NEGATIVE: 1
BACK PAIN: 0
SORE THROAT: 0
ABDOMINAL PAIN: 0
EYES NEGATIVE: 1
EYE REDNESS: 0
CONSTIPATION: 0
SHORTNESS OF BREATH: 0
RESPIRATORY NEGATIVE: 1

## 2022-10-11 DIAGNOSIS — D05.12 INTRADUCTAL CARCINOMA IN SITU OF LEFT BREAST: ICD-10-CM

## 2022-10-11 RX ORDER — LETROZOLE 2.5 MG/1
TABLET, FILM COATED ORAL
Qty: 30 TABLET | Refills: 0 | Status: SHIPPED | OUTPATIENT
Start: 2022-10-11

## 2022-10-12 ENCOUNTER — PATIENT MESSAGE (OUTPATIENT)
Dept: HEMATOLOGY | Age: 52
End: 2022-10-12

## 2022-10-12 ENCOUNTER — HOSPITAL ENCOUNTER (OUTPATIENT)
Dept: WOMENS IMAGING | Age: 52
Discharge: HOME | End: 2022-10-12
Payer: COMMERCIAL

## 2022-10-12 DIAGNOSIS — M79.89 LEFT AXILLARY SWELLING: ICD-10-CM

## 2022-10-12 PROCEDURE — 76642 ULTRASOUND BREAST LIMITED: CPT

## 2022-10-13 RX ORDER — ENOXAPARIN SODIUM 100 MG/ML
80 INJECTION SUBCUTANEOUS 2 TIMES DAILY
Qty: 11.2 ML | Refills: 0 | Status: SHIPPED | OUTPATIENT
Start: 2022-10-13 | End: 2022-10-20

## 2022-10-13 NOTE — PROGRESS NOTES
Last dose of Eliquis to be PM dose on 10/16/2022. Begin Lovenox 80 mg subcu the a.m. of 10/17/2022 and continue every 12 hours with last dose being a.m. on 10/19/2022 with planned surgery a.m. of 10/20/2022.   Resume Eliquis post surgery once surgeon gives approval.

## 2022-10-24 ENCOUNTER — PATIENT MESSAGE (OUTPATIENT)
Dept: PRIMARY CARE CLINIC | Age: 52
End: 2022-10-24

## 2022-10-24 RX ORDER — BENZONATATE 100 MG/1
100 CAPSULE ORAL 3 TIMES DAILY PRN
Qty: 30 CAPSULE | Refills: 0 | Status: SHIPPED | OUTPATIENT
Start: 2022-10-24 | End: 2022-10-31

## 2022-10-24 RX ORDER — DEXTROMETHORPHAN HYDROBROMIDE AND PROMETHAZINE HYDROCHLORIDE 15; 6.25 MG/5ML; MG/5ML
5 SYRUP ORAL 4 TIMES DAILY PRN
Qty: 240 ML | Refills: 0 | Status: SHIPPED | OUTPATIENT
Start: 2022-10-24 | End: 2022-10-31

## 2022-10-24 RX ORDER — LIRAGLUTIDE 6 MG/ML
1.8 INJECTION, SOLUTION SUBCUTANEOUS NIGHTLY
COMMUNITY
Start: 2022-10-05

## 2022-10-24 NOTE — TELEPHONE ENCOUNTER
From: Ludger Mohs  To: Jazmyn Yi  Sent: 10/24/2022 2:19 PM CDT  Subject: Cough medicine     Im recovering from surgery and have developed a little cough. Since Im in so many old lady meds Im scared to just take something without checking. I have musinex and albuterol. No fever. And no chest pains. Just a little heavy and the cough. Thanks.

## 2022-11-04 ENCOUNTER — TELEPHONE (OUTPATIENT)
Dept: SURGERY | Age: 52
End: 2022-11-04

## 2022-11-04 NOTE — TELEPHONE ENCOUNTER
Patient had mammogram 5/2022. She would like to have an interval MRI due to mammogram did not detect her breast cancer and is just scared something might get missed. I let her know I would talk to Dr. Mirta Morelos and get back with her. She has a follow up in December and hopefully we can get before that.

## 2022-11-04 NOTE — TELEPHONE ENCOUNTER
Pt called requesting an MRI of breast. Pt is unsure of if Dr. Hazel Moya would want bilateral or just left/right. Please place order for pt and contact when able.  Thank you

## 2022-11-07 DIAGNOSIS — Z85.3 PERSONAL HISTORY OF BREAST CANCER: Primary | ICD-10-CM

## 2022-11-07 NOTE — TELEPHONE ENCOUNTER
Called and left message for patient that Dr. Becka Alicia said she could do MRI.  I will get scheduled and call patient back with appointments

## 2022-11-16 DIAGNOSIS — D05.12 INTRADUCTAL CARCINOMA IN SITU OF LEFT BREAST: ICD-10-CM

## 2022-11-16 RX ORDER — LETROZOLE 2.5 MG/1
TABLET, FILM COATED ORAL
Qty: 30 TABLET | Refills: 0 | Status: SHIPPED | OUTPATIENT
Start: 2022-11-16

## 2022-11-22 ENCOUNTER — HOSPITAL ENCOUNTER (OUTPATIENT)
Dept: MRI IMAGING | Age: 52
Discharge: HOME | End: 2022-11-22
Payer: COMMERCIAL

## 2022-11-22 DIAGNOSIS — Z85.3 PERSONAL HISTORY OF BREAST CANCER: ICD-10-CM

## 2022-11-22 PROCEDURE — 6360000004 HC RX CONTRAST MEDICATION: Performed by: SURGERY

## 2022-11-22 PROCEDURE — C8908 MRI W/O FOL W/CONT, BREAST,: HCPCS

## 2022-11-22 PROCEDURE — A9577 INJ MULTIHANCE: HCPCS | Performed by: SURGERY

## 2022-11-22 RX ADMIN — GADOBENATE DIMEGLUMINE 19 ML: 529 INJECTION, SOLUTION INTRAVENOUS at 08:38

## 2022-12-07 NOTE — PROGRESS NOTES
Progress Note      Pt Name: Quyen Sousa: 1970  MRN: [id number]    Date of evaluation: 12/08/2022  History Obtained From:  patient, electronic medical record    CHIEF COMPLAINT:    Chief Complaint   Patient presents with    Breast Cancer    Other     Anticoagulation for history of provoked PE       HISTORY OF PRESENT ILLNESS:    Tia Watkins is a 50 y.o.  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 Amanda Negron was initiated on preventive endocrine therapy with tamoxifen 20 mg daily on 6/21/2021 followed by left nipple sparing mastectomy with immediate tissue expander reconstruction on 7/27/2021 followed by total laparoscopic hysterectomy and bilateral salpingo-oophorectomy on 4/28/2022. Recommendation was to continue with anticoagulation with Eliquis 5 mg p.o. twice daily until she had completed all of her breast reconstruction procedures and preventive therapy with Femara 2.5 mg daily for 5 years. Amanda Negron returns today in scheduled follow-up for evaluation, lab monitoring, side effect monitoring and further treatment recommendations. She reports that she has completed all of her reconstruction and was released from Dr. Kristie Christiansen on 11/30/2022. Reports compliant with Femara although has noted some increase moodiness and depression, denied any suicidal ideations and also complains of increased hot flashes and vaginal dryness. Reports continues to take her Eliquis as prescribed, due to her PE being surgically provoked and she has completed all of her reconstruction it is appropriate to discontinue her Eliquis. Today's clinic visit to include physical assessment, review of systems, any radiograph or lab findings that were available and plan of care are documented below.         ONCOLOGIC HISTORY:  Ductal Carcinoma in Situ of the left breast, ER 94%, stage 0, pTis, pNx, pMx  Jignesh Carter was seen in initial medical oncology and hematology consultation on 8/7/2021 regarding bilateral pulmonary emboli in the setting of a recent left mastectomy for DCIS. Jignesh Carter established care and a well visit with [facility], ENRIQUE on 4/13/2021. A Pap smear was completed and she was incidentally noted to have an elevated blood pressure and placed on lisinopril with recommendations for close monitoring of BP. Orders were also given for routine screening arrangements were made for bilateral mammogram.     Bilateral mammogram at [facility] on 4/19/2021  Left breast with multiple groups of calcifications, which have increased in size and number compared to 07/14/2014. No mammographic evidence of malignancy in the right breast.      Left mammogram with CAD at [facility] on 4/23/2021  Multiple similar appearing groups of indeterminate calcifications in   the left breast. Recommend stereotactic left breast biopsy. Ultrasound-guided left breast biopsy at [facility] on 4/30/2021   Pathology indicating atypical ductal hyperplasia. Tissue was sent to Dr. Pat Aguirre for consultation and she reported \"the changes are concerning for low-grade ductal carcinoma in situ, but are insufficiently developed to establish the diagnosis and the sample. Final classification should be made on excisional biopsy specimen\". MRI of bilateral breast on 5/26/2021 at [address] [place], [place] [address]vard 8.5 x 3.3 x 5.4 cm area of regional non-mass enhancement in the LEFT upper outer breast, with differential diagnosis including DCIS. No suspicious focal mass in either breast. No focal mass adjacentto the LEFT breast biopsy clip marker. No axillary or internal mammary lymphadenopathy      Jignesh Carter was seen in initial consultation by KATHLEEN Brito on 5/21/2021 followed by an appointment with Dr. Domenic Carroll on 5/27/2021.      Left breast biopsy on 6/2/2021 by Dr. Cee Sneed  Micropapillary ductal carcinoma in situ. Negative for invasion  ER is positive at 94%. Seen by Dr. Cee Sneed on 6/21/2021 and initiated on preventive tamoxifen 20 mg PO. Left nipple sparing mastectomy with immediate tissue expander reconstruction on 7/27/2021 by Dr. Cee Sneed  Pathology:   1. Extensive low-grade ductal carcinoma in situ (micropapillary and cribriform types). 2.  In situ carcinoma demonstrates extensive intraluminal mucin production. 3. In situ carcinoma measures at least 8.0 cm in greatest dimension. 4. In situ carcinoma extends to within 0.1 cm of the deep surgical excision margin and 0.4 cm of the anterior margin. 5.  Negative for invasive carcinoma. AJCC STAGE: pTis, pNx, pMx      Dr. Jm Jaramillo discussed the following with Dr. Marc Marc who is in agreement with holding tamoxifen until after completion of left breast reconstruction. At that time if declared postmenopausal can consider initiating preventative Femara or will consider resuming tamoxifen. Tamoxifen was started in the preventative setting. This of course is to prevent new disease developments over the future years in the right breast.  Although Tracy Casas will be on anticoagulation, she does have future surgeries scheduled for reconstruction. Dr. Jm Jaramillo prefered that she be off of tamoxifen which can be a prothrombotic agent until after all of that has been accomplished. Recommend stopping Tamoxifen due to thrombotic risk factor on 8/7/2021  The issue of preventive therapy will be addressed after completion of left breast reconstruction. At that time, if declared postmenopausal will consider initiating preventative Femara. Reports irregular menstrual cycles and the last menstrual cycle was in March 2021. DIP flap reconstruction on 02//22/2022 per Dr Yamila Torres.   She did experience some postoperative bleeding which led to a drop in hemoglobin and she was placed on ferrous sulfate 325 mg 3 times a day by Dr. Jennifer Fish. IUD removed on 1/14/2022. She has decided to move forward with hysterectomy now versus in the fall and is seeing Dr Darrick Blas and planning on hysterectomy in April or May 2022.     04/28/2022- Total laparoscopic hysterectomy and bilateral salpingo-oophorectomy. Pathology benign. 05/16/2022 Bilateral mammogram- no mammographic evidence of malignancy; left mastectomy with ROSY reconstruction    11/19/2021 FSH- 77.2 (postmenopause)    10/12/2022 Ultrasound left breast- Small benign-appearing lymph node otherwise no mass or cyst seen. 10/20/2022- Left breast reconstruction revision, left breast fat grafting, and abdominal scar revision per Dr Jennifer Fish at [facility]    11/22/2022 MRI bilateral breast- Left breast, BI-RADS 3, probably benign. 6 to 7 mm enhancing mass  in the lower outer quadrant of the reconstructed left breast status post mastectomy with autologous muscle reconstruction is most consistent with a benign finding, and likely a reactive lymph node, or possibly oil cyst/fat necrosis. Recommendation is short-term follow-up MRI in 6 months. No worrisome MRI abnormality seen in the right breast. Overall assessment BI-RADS 3, probably benign. Treatment summary:  Initiated on preventative tamoxifen 20 mg on 6/21/2021  Left nipple sparing mastectomy with immediate tissue expander reconstruction on 7/27/2021 by Dr. Taya Dodson  Preventative tamoxifen held on 8/7/2021 5/19/2022-initiate adjuvant endocrine therapy with Femara 2.5 mg daily      HEMATOLOGIC HISTORY:  Provoked bilateral pulmonary emboli, 8/6/2021 (suspect secondary to tamoxifen and surgical intervention)  Sara Thurman was admitted to [facility] to the ED on 8/6/2021 with bilateral pulmonary emboli. Sara Thurman presented to [address] [place] [address] ED with right-sided sharp pleuritic chest discomfort over the previous 12-24 hours.  The pain was a stabbing knifelike sensation    CTA of the chest with contrast on 8/6/2021  Small to moderate amount of pulmonary embolus greatest in the right lower lobe pulmonary artery branches. There is bilateral pulmonary embolus. No definite CT evidence of right heart strain. Mild cardiomegaly. Focal parenchymal consolidation in the right lower lobe with surrounding groundglass opacity in the same distribution as thegreatest amount of pulmonary embolus. This may represent a pulmonary infarct. PT 14.0/INR 1.06 and aPTT 21.3 on 8/6/2021  COVID-19, rapid negative on 8/6/2021     Florestine Appl was placed on on heparin drip and admitted for further evaluation and recommendations. Noninvasive venous study of bilateral lower extremities  on 8/7/2021 with no evidence of DVT, SVT or reflux. Serology studies on 8/7/2021  Anticardiolipin IgA 1.5, IgG 1.3, IgM 3.8 -negative  Beta 2 Glycoprotein IgG 0, IgM 2 (0-20)  Cardiolipin antibodies IgG <10, IgM <10 (0-14)  Protein C Ag >95 (%)  Protein S Ag 107 (%)  Antithrombin activity 92 ()  Antithrombin Ag 76 ()  Lupus anticoagulant-  not detected  Factor V Leiden - negative  Prothrombin gene mutation - negative  MTHFR: compound heterozygous Mutation C677T and Mutation T1342T (may be associated with a mild decrease in MTHFR enzyme activity although clinically insignificant and most likely not a factor recent finding of pulmonary emboli)   Homocysteine 5 (0-15)    Heparin discontinued and initiated Eliquis 10 mg p.o. twice daily x7 days followed by Eliquis 5 mg p.o. twice daily on 8/8/2021. JAK2 V617F flex to CA LR/exon 12-15/MPL was negative on 9/1/2021 11/03/2021 CTA Chest- No evidence of embolic disease. There is no residual from a emboli  described August 6, 2021.      04/29/2022 CTA pulmonary with contrast- Less than optimal opacification of the pulmonary arteries. No visible pulmonary embolus. Main pulmonary artery segment upper limits of normal in size measuring 2.9 cm. Ascending thoracic aorta is dilated at 4 cm. Heart size is prominent. Linear atelectasis in both lung bases. 2022 - reconstruction completed and was released from Dr. Yvon Lea on 2022. Due to her PE being surgically provoked and she has completed all of her reconstruction it is appropriate to discontinue Eliquis.       Age Appropriate Health Screenin2022- Cologuard- negative  2022 Bone Density- normal; lumbar spine T score -0.7; left femoral neck T score -0.1    Past Medical History:    Past Medical History:   Diagnosis Date    Breast cancer ([facility] [address]     Cancer ([facility] [address] 2021    DCIS Left Breast      delivery delivered     Ductal carcinoma in situ (DCIS) of left breast 2021    History of D&C     1993    Hx of blood clots     Post op - left Mastectomy Eliquis    Hypertension        Past Surgical History:    Past Surgical History:   Procedure Laterality Date    APPENDECTOMY      BREAST RECONSTRUCTION Left 2022     SECTION      DILATION AND CURETTAGE OF UTERUS      X2    HYSTERECTOMY (CERVIX STATUS UNKNOWN) Bilateral 2022    ROBOTIC TOTAL LAPAROSCOPIC HYSTERECTOMY, BILATERAL SALPINGO-OOPHORECTOMY, CYSTOSCOPY performed by Laurie Smith MD at [facility] Left 2021    LEFT NIPPLE SPARING MASTECTOMY VIA WISE PATTERN, TISSUE EXPANDER RECONSTRUCTION, PEC BLOCK performed by Caty Moore MD at [address] [place] [address] LEFT Left 2021    US BREAST NEEDLE BIOPSY LEFT 2021 [facility]       Current Medications:    Current Outpatient Medications   Medication Sig Dispense Refill    TART CHERRY ULTRA PO Take by mouth daily      venlafaxine (EFFEXOR XR) 37.5 MG extended release capsule Take 1 capsule by mouth daily 30 capsule 3    letrozole (FEMARA) 2.5 MG tablet Take 1 tablet by mouth once daily 30 tablet 0    loratadine (CLARITIN) 10 MG capsule Take 10 mg by mouth daily      vitamin D (ERGOCALCIFEROL) 1.25 MG (72569 UT) CAPS capsule Take 1 capsule by mouth once a week Indications: Wednesday 12 capsule 1    ondansetron (ZOFRAN) 4 MG tablet Take 1 tablet by mouth 3 times daily as needed for Nausea or Vomiting 30 tablet 0    Multiple Vitamin (MULTIVITAMIN ADULT PO) Take 1 tablet by mouth daily       Ascorbic Acid (VITAMIN C) 500 MG CAPS Take 500 mg by mouth daily       Fluticasone Propionate (FLONASE ALLERGY RELIEF NA)       Probiotic Product (PROBIOTIC ADVANCED) CAPS Take 1 capsule by mouth daily      liraglutide-weight management 18 MG/3ML SOPN Inject 3 mg into the skin daily 5 Adjustable Dose Pre-filled Pen Syringe 3     No current facility-administered medications for this visit. Allergies: No Known Allergies    Social History:    Social History     Tobacco Use    Smoking status: Never    Smokeless tobacco: Never   Vaping Use    Vaping Use: Never used   Substance Use Topics    Alcohol use: Never    Drug use: Never       Family History:   Family History   Problem Relation Age of Onset    Hypertension Mother     No Known Problems Father     Thyroid Disease Sister     Cancer Maternal Great Grandmother         Unknown       Vitals:  Vitals:    12/08/22 1001   BP: 110/80   Site: Left Upper Arm   Position: Sitting   Pulse: 70   SpO2: 98%   Weight: 190 lb 1.6 oz (86.2 kg)   Height: 5' 5\" (1.651 m)        Subjective   REVIEW OF SYSTEMS:   Review of Systems   Constitutional:  Positive for fatigue. Negative for chills, diaphoresis and fever. HENT: Negative. Negative for congestion, ear pain, hearing loss, nosebleeds, sore throat and tinnitus. Eyes: Negative. Negative for pain, discharge and redness. Respiratory: Negative. Negative for cough, shortness of breath and wheezing. Cardiovascular: Negative. Negative for chest pain, palpitations and leg swelling. Gastrointestinal: Negative. Negative for abdominal pain, blood in stool, constipation, diarrhea, nausea and vomiting.    Endocrine: Positive for heat intolerance. Negative for polydipsia. Genitourinary:  Positive for vaginal pain. Negative for dysuria, flank pain, frequency, hematuria and urgency. Musculoskeletal:  Positive for arthralgias (worse in feet). Negative for back pain, myalgias and neck pain. Skin: Negative. Negative for rash. Neurological: Negative. Negative for dizziness, tremors, seizures, weakness and headaches. Hematological:  Does not bruise/bleed easily. Psychiatric/Behavioral: Negative. The patient is not nervous/anxious. Gan and mild depression     Objective   PHYSICAL EXAM:  Physical Exam  Vitals reviewed. Constitutional:       General: She is not in acute distress. Appearance: She is well-developed. HENT:      Head: Normocephalic and atraumatic. Mouth/Throat:      Pharynx: Uvula midline. Tonsils: No tonsillar exudate. Eyes:      General: Lids are normal.      Conjunctiva/sclera: Conjunctivae normal.      Pupils: Pupils are equal, round, and reactive to light. Neck:      Thyroid: No thyroid mass or thyromegaly. Vascular: No JVD. Trachea: Trachea normal. No tracheal deviation. Cardiovascular:      Rate and Rhythm: Normal rate and regular rhythm. Pulses: Normal pulses. Heart sounds: Normal heart sounds. Pulmonary:      Effort: Pulmonary effort is normal. No respiratory distress. Breath sounds: Normal breath sounds. No wheezing or rales. Chest:      Chest wall: No tenderness. Abdominal:      General: Bowel sounds are normal. There is no distension. Palpations: Abdomen is soft. There is no mass. Tenderness: There is no abdominal tenderness. There is no guarding. Musculoskeletal:         General: No tenderness or deformity. Cervical back: Normal range of motion and neck supple. Comments: Range of motion within normal limits x4 extremities   Skin:     General: Skin is warm. Findings: No bruising, erythema or rash.    Neurological:      Mental Status: She is alert and oriented to person, place, and time. Cranial Nerves: No cranial nerve deficit. Coordination: Coordination normal.   Psychiatric:         Mood and Affect: Mood is depressed. Behavior: Behavior normal.         Thought Content: Thought content normal.      Comments: Denied any suicidal ideations     Labs reviewed today:  Lab Results   Component Value Date    WBC 7.82 12/08/2022    HGB 13.4 12/08/2022    HCT 41.3 12/08/2022    MCV 95.4 (H) 12/08/2022     12/08/2022     Lab Results   Component Value Date    NEUTROABS 4.79 12/08/2022         ASSESSMENT/PLAN:      1. 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 Current recommendation is for preventive endocrine therapy with Femara 2.5 mg daily for 5 years, through May 2027. Reports compliant with Femara. Denies any reconstructed breast complaints. 10/12/2022 Ultrasound left breast- Small benign-appearing lymph node otherwise no mass or cyst seen. 10/20/2022- Left breast reconstruction revision, left breast fat grafting, and abdominal scar revision per Dr Brennan Naidu at [facility]    11/22/2022 MRI bilateral breast- Left breast, BI-RADS 3, probably benign. 6 to 7 mm enhancing mass  in the lower outer quadrant of the reconstructed left breast status post mastectomy with autologous muscle reconstruction is most consistent with a benign finding, and likely a reactive lymph node, or possibly oil cyst/fat necrosis. Recommendation is short-term follow-up MRI in 6 months. No worrisome MRI abnormality seen in the right breast. Overall assessment BI-RADS 3, probably benign. Post-op follow up with Dr Herlinda Leone on 11/30/2022-released from her services     -Continue adjuvant Femara 2.5 mg p.o. daily for anticipated 5 years  -Encouraged self exams    I discussed the importance of compliance with Femara/letrozole.  Decreased compliance with adjuvant endocrine therapy has been linked to decreased survival. We discussed the various barriers and side effects of endocrine therapy and ways to improve compliance. She acknowledged understanding. Bone health:  06/02/2022 Bone Density- normal; lumbar spine T score -0.7; left femoral neck T score -0.1    Vitamin D level of 57.1 on 05/19/2022. Reports currently taking multivitamin.    -Recommend calcium 1200 mg with vitamin D 1000 units daily    2. Encounter for monitoring letrozole/Femara  -Reports hot flashes have intensified  -Reports increased depression and mood swings-Will initiate Effexor 3.75 mg daily  -Noted hair thinning, no change from previous exam  -Increased arthralgias since initiating, tolerable  -Vaginal dryness-prescription sent for oxytocin cream compound to [facility]: Administer cream per pharmacy's direction      3. Provoked bilateral pulmonary embolism, 8/6/2021. JAK2 V617F flex to CA LR/exon 12-15/MPL was negative on 9/1/2021. Reports compliant with Eliquis. 04/29/2022 CTA pulmonary with contrast- Less than optimal opacification of the pulmonary arteries. No visible pulmonary embolus. Main pulmonary artery segment upper limits of normal in size measuring 2.9 cm. Ascending thoracic aorta is dilated at 4 cm. Heart size is prominent. Linear atelectasis in both lung bases.    -Discontinuing Eliquis at this time. Has completed all breast reconstructive surgery. 4.  Survivorship and health maintenance recommendations  Keep a healthy body weight. Dietary recommendations include limit energy intake from total fats and sugars; increase consumption of fruit and vegetables, as well as legumes, whole grains and nuts. Engage in regular physical activity (150 minutes spread through the week). Other recommendations include minimizing alcohol intake, avoid use of tobacco products. Practice sun safety: Utilize a sunscreen with an SPF of at least 27. Avoiding tanning beds. Ensure adequate amount of sleep. Follow-up with primary care regularly and for further recommendations regarding age-appropriate screening for cancer, wellbeing visit (preventive measures), follow-up and treatment for other medical comorbidities. I discussed all of the above findings included in the assessment and plan with the patient and the patient is in agreement to move forward with current recommendations/treatment. I have addressed all of their questions and concerns that were verbalized. FOLLOW UP:  Follow-up appointment given for 2 months or sooner if needed  Continue to follow with other medical providers as recommended. Labs at next visit: CBC and CMP    EMR Dragon/Transcription disclaimer:   Much of this encounter note is an electronic transcription/translation of spoken language to printed text. The electronic translation of spoken language may permit erroneous, or at times, nonsensical words or phrases to be inadvertently transcribed; although attempts have made to review the note for such errors, some may still exist.  Please excuse any unrecognized transcription errors and contact us if the error is unintelligible or needs documented correction. Also, portions of this note have been copied forward, however, changed to reflect the most current clinical status of this patient. Breanne RICE am pre-charting as a registered nurse for [employer], APRN.   Electronically signed by ENRIQUE Minaya on 12/12/2022 at 8:08 AM

## 2022-12-08 ENCOUNTER — OFFICE VISIT (OUTPATIENT)
Dept: HEMATOLOGY | Age: 52
End: 2022-12-08
Payer: COMMERCIAL

## 2022-12-08 ENCOUNTER — HOSPITAL ENCOUNTER (OUTPATIENT)
Dept: INFUSION THERAPY | Age: 52
Discharge: HOME | End: 2022-12-08
Payer: COMMERCIAL

## 2022-12-08 ENCOUNTER — OFFICE VISIT (OUTPATIENT)
Dept: PRIMARY CARE CLINIC | Age: 52
End: 2022-12-08
Payer: COMMERCIAL

## 2022-12-08 VITALS
TEMPERATURE: 97.3 F | BODY MASS INDEX: 31.49 KG/M2 | OXYGEN SATURATION: 99 % | SYSTOLIC BLOOD PRESSURE: 120 MMHG | HEART RATE: 81 BPM | DIASTOLIC BLOOD PRESSURE: 82 MMHG | RESPIRATION RATE: 16 BRPM | WEIGHT: 189 LBS | HEIGHT: 65 IN

## 2022-12-08 VITALS
HEIGHT: 65 IN | BODY MASS INDEX: 31.67 KG/M2 | DIASTOLIC BLOOD PRESSURE: 80 MMHG | WEIGHT: 190.1 LBS | OXYGEN SATURATION: 98 % | HEART RATE: 70 BPM | SYSTOLIC BLOOD PRESSURE: 110 MMHG

## 2022-12-08 DIAGNOSIS — F32.A DEPRESSION, ACUTE: ICD-10-CM

## 2022-12-08 DIAGNOSIS — D05.12 INTRADUCTAL CARCINOMA IN SITU OF LEFT BREAST: Primary | ICD-10-CM

## 2022-12-08 DIAGNOSIS — R63.5 WEIGHT GAIN: ICD-10-CM

## 2022-12-08 DIAGNOSIS — Z79.899 ENCOUNTER FOR MONITORING ADJUVANT HORMONAL THERAPY: ICD-10-CM

## 2022-12-08 DIAGNOSIS — R23.2 HOT FLASHES RELATED TO AROMATASE INHIBITOR THERAPY: ICD-10-CM

## 2022-12-08 DIAGNOSIS — D05.12 INTRADUCTAL CARCINOMA IN SITU OF LEFT BREAST: ICD-10-CM

## 2022-12-08 DIAGNOSIS — Z13.220 ENCOUNTER FOR SCREENING FOR LIPID DISORDER: ICD-10-CM

## 2022-12-08 DIAGNOSIS — N95.1 MENOPAUSAL VAGINAL DRYNESS: ICD-10-CM

## 2022-12-08 DIAGNOSIS — Z51.81 ENCOUNTER FOR MONITORING ADJUVANT HORMONAL THERAPY: ICD-10-CM

## 2022-12-08 DIAGNOSIS — Z78.0 POSTMENOPAUSAL: ICD-10-CM

## 2022-12-08 DIAGNOSIS — T45.1X5A HOT FLASHES RELATED TO AROMATASE INHIBITOR THERAPY: ICD-10-CM

## 2022-12-08 DIAGNOSIS — I26.99 BILATERAL PULMONARY EMBOLISM (HCC): ICD-10-CM

## 2022-12-08 LAB
BASOPHILS ABSOLUTE: 0.06 K/UL (ref 0.01–0.08)
BASOPHILS RELATIVE PERCENT: 0.8 % (ref 0.1–1.2)
EOSINOPHILS ABSOLUTE: 0.23 K/UL (ref 0.04–0.54)
EOSINOPHILS RELATIVE PERCENT: 2.9 % (ref 0.7–7)
HCT VFR BLD CALC: 41.3 % (ref 34.1–44.9)
HEMOGLOBIN: 13.4 G/DL (ref 11.2–15.7)
LYMPHOCYTES ABSOLUTE: 1.99 K/UL (ref 1.18–3.74)
LYMPHOCYTES RELATIVE PERCENT: 25.4 % (ref 19.3–53.1)
MCH RBC QN AUTO: 30.9 PG (ref 25.6–32.2)
MCHC RBC AUTO-ENTMCNC: 32.4 G/DL (ref 32.3–35.5)
MCV RBC AUTO: 95.4 FL (ref 79.4–94.8)
MONOCYTES ABSOLUTE: 0.73 K/UL (ref 0.24–0.82)
MONOCYTES RELATIVE PERCENT: 9.3 % (ref 4.7–12.5)
NEUTROPHILS ABSOLUTE: 4.79 K/UL (ref 1.56–6.13)
NEUTROPHILS RELATIVE PERCENT: 61.3 % (ref 34–71.1)
PDW BLD-RTO: 12.7 % (ref 11.7–14.4)
PLATELET # BLD: 247 K/UL (ref 182–369)
PMV BLD AUTO: 10.6 FL (ref 7.4–10.4)
RBC # BLD: 4.33 M/UL (ref 3.93–5.22)
WBC # BLD: 7.82 K/UL (ref 3.98–10.04)

## 2022-12-08 PROCEDURE — 99213 OFFICE O/P EST LOW 20 MIN: CPT | Performed by: NURSE PRACTITIONER

## 2022-12-08 PROCEDURE — 36415 COLL VENOUS BLD VENIPUNCTURE: CPT

## 2022-12-08 PROCEDURE — 85025 COMPLETE CBC W/AUTO DIFF WBC: CPT

## 2022-12-08 PROCEDURE — 99214 OFFICE O/P EST MOD 30 MIN: CPT | Performed by: NURSE PRACTITIONER

## 2022-12-08 PROCEDURE — 99212 OFFICE O/P EST SF 10 MIN: CPT

## 2022-12-08 RX ORDER — VENLAFAXINE HYDROCHLORIDE 37.5 MG/1
37.5 CAPSULE, EXTENDED RELEASE ORAL DAILY
Qty: 30 CAPSULE | Refills: 3 | Status: SHIPPED | OUTPATIENT
Start: 2022-12-08

## 2022-12-08 ASSESSMENT — ENCOUNTER SYMPTOMS
EYES NEGATIVE: 1
GASTROINTESTINAL NEGATIVE: 1
RESPIRATORY NEGATIVE: 1

## 2022-12-08 NOTE — PATIENT INSTRUCTIONS
1. Be accountable for what you eat, lose it sree or my fitness pal will help you keep up with calories and exercise. [place] diet is best to follow Exercise regularly 3-5 x a week at least 30min at a time   2. Continue the saxenda. Working up every week on dose. Next 2.4mg for at least 1 week then could go up to the 3mg.

## 2022-12-08 NOTE — PROGRESS NOTES
[facility] PHYSICIAN [facility]  [facility]  [address]  [address] [place] [address]  Dept: [phone]  Dept Fax: [phone]  Loc: [phone]    Stacy Maldonado is a 50 y.o. female who presents today for her medical conditions/complaints as noted below. Stacy Maldonado is c/o of 3 Month Follow-Up (Patient presents today for 3 month follow up.)        HPI:     HPI   Chief Complaint   Patient presents with    3 Month Follow-Up     Patient presents today for 3 month follow up. Stopped taking during OCt due to surgery went back on it after surgery and just now able to exercise. She has kept her weight off , she had to taper back up on saxenda due to stopping and now up to the 1.8mg SQ nightly. She had previously done phentermine and could not tolerate. She did see oncology and they started her on Effexor 37.5. She has not been taking it but for day. She is hoping it will help her hot flashes night sweats and mood. She also is on Amera for the estrogen blocker.   Past Medical History:   Diagnosis Date    Breast cancer ([facility] [address]     Cancer ([facility] [address] 2021    DCIS Left Breast      delivery delivered     Ductal carcinoma in situ (DCIS) of left breast 2021    History of D&C     1993    Hx of blood clots     Post op  left Mastectomy Eliquis    Hypertension       Past Surgical History:   Procedure Laterality Date    APPENDECTOMY      BREAST RECONSTRUCTION Left 2022     SECTION      DILATION AND CURETTAGE OF UTERUS      X2    HYSTERECTOMY (CERVIX STATUS UNKNOWN) Bilateral 2022    ROBOTIC TOTAL LAPAROSCOPIC HYSTERECTOMY, BILATERAL SALPINGO-OOPHORECTOMY, CYSTOSCOPY performed by Spring Gruber MD at [address] Left 2021    LEFT NIPPLE SPARING MASTECTOMY VIA WISE PATTERN, TISSUE EXPANDER RECONSTRUCTION, PEC BLOCK performed by Dru Hickey MD at [address] [place] [address] LEFT Left 6/2/2021    US BREAST NEEDLE BIOPSY LEFT 6/2/2021 [facility]       Vitals 12/8/2022 12/8/2022 9/30/2022 9/14/2022 7/13/2022 0/63/2111   SYSTOLIC 356 225 140 023 978 226   DIASTOLIC 82 80 78 60 80 80   Site - Left Upper Arm - - - -   Position - Sitting - - - -   Cuff Size - - - - - -   Pulse 81 70 101 86 86 77   Temp 97.3 - - 97.2 96.8 96.8   Resp 16 - - 16 16 16   SpO2 99 98 98 99 98 98   Weight 189 lb 190 lb 1.6 oz 190 lb 6.4 oz 190 lb 194 lb 199 lb   Height 5' 5\" 5' 5\" - 5' 5\" 5' 5.5\" 5' 5.5\"   Body mass index 31.45 kg/m2 31.63 kg/m2 - 31.61 kg/m2 31.79 kg/m2 32.61 kg/m2   Pain Level - - - - - -   Some recent data might be hidden       Family History   Problem Relation Age of Onset    Hypertension Mother     No Known Problems Father     Thyroid Disease Sister     Cancer Maternal Great Grandmother         Unknown       Social History     Tobacco Use    Smoking status: Never    Smokeless tobacco: Never   Substance Use Topics    Alcohol use: Never      Current Outpatient Medications on File Prior to Visit   Medication Sig Dispense Refill    TART CHERRY ULTRA PO Take by mouth daily      venlafaxine (EFFEXOR XR) 37.5 MG extended release capsule Take 1 capsule by mouth daily 30 capsule 3    letrozole (FEMARA) 2.5 MG tablet Take 1 tablet by mouth once daily 30 tablet 0    loratadine (CLARITIN) 10 MG capsule Take 10 mg by mouth daily      vitamin D (ERGOCALCIFEROL) 1.25 MG (53071 UT) CAPS capsule Take 1 capsule by mouth once a week Indications:  Wednesday 12 capsule 1    ondansetron (ZOFRAN) 4 MG tablet Take 1 tablet by mouth 3 times daily as needed for Nausea or Vomiting 30 tablet 0    Multiple Vitamin (MULTIVITAMIN ADULT PO) Take 1 tablet by mouth daily       Ascorbic Acid (VITAMIN C) 500 MG CAPS Take 500 mg by mouth daily       Fluticasone Propionate (FLONASE ALLERGY RELIEF NA)       Probiotic Product (PROBIOTIC ADVANCED) CAPS Take 1 capsule by mouth daily       No current facility-administered medications on file prior to visit. No Known Allergies    Health Maintenance   Topic Date Due    HIV screen  Never done    Hepatitis C screen  Never done    DTaP/Tdap/Td vaccine (2 - Td or Tdap) 04/03/2019    COVID-19 Vaccine (3 - Booster for Moderna series) 04/26/2021    Shingles vaccine (1 of 2) Never done    Flu vaccine (1) Never done    Breast cancer screen  05/16/2023    Depression Screen  07/13/2023    Colorectal Cancer Screen  07/05/2025    Lipids  04/13/2026    Hepatitis A vaccine  Aged Out    Hib vaccine  Aged Out    Meningococcal (ACWY) vaccine  Aged Out    Pneumococcal 0-64 years Vaccine  Aged Out       Subjective:      Review of Systems   Constitutional:  Positive for fatigue. HENT: Negative. Eyes: Negative. Respiratory: Negative. Cardiovascular: Negative. Gastrointestinal: Negative. Genitourinary: Negative. Musculoskeletal: Negative. Skin: Negative. Neurological: Negative. Psychiatric/Behavioral: Negative. Objective:     Physical Exam  Vitals and nursing note reviewed. Constitutional:       Appearance: Normal appearance. She is well-developed. HENT:      Head: Normocephalic. Eyes:      Pupils: Pupils are equal, round, and reactive to light. Cardiovascular:      Rate and Rhythm: Normal rate and regular rhythm. Heart sounds: Normal heart sounds. Pulmonary:      Effort: Pulmonary effort is normal.      Breath sounds: Normal breath sounds. Musculoskeletal:      Cervical back: Normal range of motion. Skin:     General: Skin is warm and dry. Capillary Refill: Capillary refill takes less than 2 seconds. Neurological:      General: No focal deficit present. Mental Status: She is alert and oriented to person, place, and time. Mental status is at baseline. Psychiatric:         Mood and Affect: Mood normal.         Behavior: Behavior normal.         Thought Content:  Thought content normal.         Judgment: Judgment normal.     /82   Pulse 81 Temp 97.3 °F (36.3 °C) (Temporal)   Resp 16   Ht 5' 5\" (1.651 m)   Wt 189 lb (85.7 kg)   LMP 03/26/2021 Comment: 4/28/2022  SpO2 99%   BMI 31.45 kg/m²     Assessment:       Diagnosis Orders   1. Intraductal carcinoma in situ of left breast        2. Encounter for screening for lipid disorder  Lipid Panel      3. Weight gain  TSH            Plan:   More than 50% of the time was spent counseling and coordinating care for a total time of 25min face to face. PDMP Monitoring:    Last PDMP Jesus Laberto as Reviewed:  Review User Review Instant Review Result            Urine Drug Screenings (1 yr)    No resulted procedures found. Medication Contract and Consent for Opioid Use Documents Filed        No documents found                     Patient given educational materials -see patient instructions. Discussed use, benefit, and side effects of prescribed medications. All patient questions answered. Pt voiced understanding. Reviewed health maintenance. Instructed to continue currentmedications, diet and exercise. Patient agreed with treatment plan. Follow up as directed. MEDICATIONS:  Orders Placed This Encounter   Medications    liraglutide-weight management 18 MG/3ML SOPN     Sig: Inject 3 mg into the skin daily     Dispense:  5 Adjustable Dose Pre-filled Pen Syringe     Refill:  3     Dispense enough for 3mg a day x 30 days         ORDERS:  Orders Placed This Encounter   Procedures    Lipid Panel    TSH       Follow-up:  No follow-ups on file. PATIENT INSTRUCTIONS:  Patient Instructions   1. Be accountable for what you eat, lose it sree or my fitness pal will help you keep up with calories and exercise. [place] diet is best to follow Exercise regularly 3-5 x a week at least 30min at a time   2. Continue the saxenda. Working up every week on dose. Next 2.4mg for at least 1 week then could go up to the 3mg.    Electronically signed by ENRIQUE Mandujano CNP on 12/8/2022 at 2:19 PM    EMR Dragon/transcription disclaimer:  Much of thisencounter note is electronic transcription/translation of spoken language to printed texts. The electronic translation of spoken language may be erroneous, or at times, nonsensical words or phrases may be inadvertentlytranscribed.   Although I have reviewed the note for such errors, some may still exist.

## 2022-12-12 ASSESSMENT — ENCOUNTER SYMPTOMS
EYE DISCHARGE: 0
DIARRHEA: 0
SORE THROAT: 0
ABDOMINAL PAIN: 0
WHEEZING: 0
VOMITING: 0
RESPIRATORY NEGATIVE: 1
SHORTNESS OF BREATH: 0
BLOOD IN STOOL: 0
EYES NEGATIVE: 1
NAUSEA: 0
EYE REDNESS: 0
CONSTIPATION: 0
COUGH: 0
GASTROINTESTINAL NEGATIVE: 1
EYE PAIN: 0
BACK PAIN: 0

## 2022-12-13 DIAGNOSIS — D05.12 INTRADUCTAL CARCINOMA IN SITU OF LEFT BREAST: ICD-10-CM

## 2022-12-13 RX ORDER — LETROZOLE 2.5 MG/1
TABLET, FILM COATED ORAL
Qty: 30 TABLET | Refills: 0 | Status: SHIPPED | OUTPATIENT
Start: 2022-12-13

## 2022-12-15 RX ORDER — LISINOPRIL 5 MG/1
TABLET ORAL
Qty: 45 TABLET | Refills: 0 | Status: SHIPPED | OUTPATIENT
Start: 2022-12-15

## 2022-12-16 NOTE — PROGRESS NOTES
HISTORY OF PRESENT ILLNESS:    Ms. Bao De La Torre presents today for a 6-month breast exam.This is following left mastectomy with tissue expander reconstruction on 7/27/2021. She was anticipating Shana flap reconstruction however she had bilateral pulmonary emboli and has been on anticoagulation the last several months. She had her Shana flap reconstruction in [place] and that went well. She has also had a mastopexy on the right which looks good. She initially presented with an ultrasound guided breast biopsy  on the left which revealed a tiny micropapillary ductal   carcinoma in situ. ER is positive at 94%. MRI-5/26/2021  FINDINGS:  There is a large 8.5 x 3.3 x 5.4 cm area of non-mass enhancement in   the LEFT upper outer breast, located along the lateral and inferior   aspect of the biopsy clip marker. No focal suspicious solid mass. No   mass adjacent to the biopsy clip marker. No abnormal LEFT breast skin   thickening or nipple enhancement. No abnormal enhancement or suspicious mass in the RIGHT breast. No   abnormal RIGHT breast skin thickening or nipple enhancement. No enlarged axillary or internal mammary lymph nodes. Limited   evaluation of the anterior chest and upper abdomen is unremarkable. No   suspicious osseous lesions. Impression   Impression:   1. Large 8.5 x 3.3 x 5.4 cm area of regional non-mass enhancement in   the LEFT upper outer breast, with differential diagnosis including   DCIS. 2.  No suspicious focal mass in either breast. No focal mass adjacent   to the LEFT breast biopsy clip marker. 3.  No axillary or internal mammary lymphadenopathy. BI-RADS CATEGORY 6: KNOWN BIOPSY WITH PROVEN MALIGNANCY. Signed by Dr Tello Burris on 5/26/2021 8:20 AM     Review of her MRI and her mammogram demonstrated an area of significant concern which is distinctly separate from the site of her initial biopsy.   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 This area however spreads over an 8 cm portion of her breast    Mammogram-5/16/2022  Findings:  Postoperative changes of the left breast   from mastectomy and reconstruction surgery. Surgical clips and linear   foreign body within the posterior left reconstructed breast/chest   wall. No suspicious masses or calcifications are identified. Breast   tissues stable in configuration. Morphologically benign-appearing   lymph nodes in the visible axilla. This study was interpreted with   CAD. IMPRESSION AND RECOMMENDATION:    Left mastectomy with ROSY reconstruction . No mammographic evidence of   malignancy. Continued annual screening mammography recommended. BIRADS Category 2 - Benign findings       PATHOLOGY REVEALS:  FINAL DIAGNOSIS:     A. Breast, left skin and nipple sparing mastectomy:   1. 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 2.  In situ carcinoma demonstrates extensive intraluminal mucin   production. 3. In situ carcinoma measures at least 8.0 cm in greatest dimension. 4. In situ carcinoma extends to within 0.1 cm of the deep surgical   excision margin and 0.4 cm of the anterior margin. 5.  Negative for invasive carcinoma. B.  Breast, excision of additional breast tissue from beneath nipple:   Benign breast parenchyma. C.  Breast, excision of additional left breast anterior margin: Benign   breast parenchyma. AJCC STAGE: pTis, pNx, pMx     She subsequently had a pulmonary embolus and is now on Eliquis for 3 months. We have been doing her fills in lieu of proceeding with her autologous reconstruction. I have discussed her at length with Drs. Rolande Peabody. She had a mammogram today on the right breast which was unremarkable.   Unfortunately they did a mammogram of the left breast which showed benign looking fat but will certainly show areas of fat necrosis in the future and I encouraged her not to mammogram the Shana flap. EXAM REASON: Patient is a 29-year-old female with significant personal   history of left breast cancer status post mastectomy with autologous   muscle flap reconstruction. She also has history of right breast   mastopexy. COMPARISON:    All prior imaging since April 19th, 2021 including the prior   mammograms, ultrasound and MRI. This includes most recent ultrasound   dated October 12, 2022    TECHNICAL:    Multiplanar, multisequence imaging was performed through the breasts   before and after the administration of IV contrast 17 mL MultiHance. Images also interpreted with CAD stream.   FINDINGS:   Contrast is seen in the heart and great vessels on postcontrast   imaging consistent with adequate bolus. The left breast is status post mastectomy with autologous muscle flap   reconstruction. Expected postsurgical changes including areas of   blooming artifact consistent with surgical clips are noted. There is a   small 6 to 7 mm oval circumscribed homogeneously enhancing mass in the   lower outer quadrant of the reconstructed breast at posterior depth,   with predominantly a type II kinetic curve with some degree of rapid   wash-in. On T1 sequences, internal fat signal is suggested. This is   seen surrounded by postsurgical change. No additional significant   findings seen in the reconstructed left breast.    Postsurgical changes also evident in the right breast status post   mastopexy. There are several homogeneously enhancing small approximate   7 mm masses throughout the lateral right breast upper outer and   lower-outer quadrants, with subtle fat signal component centrally,   most suggestive of benign lymph nodes. No suspicious findings seen. Bilateral axillary lymph nodes appear within expected normal   appearance by MRI. No abnormal lymph nodes are seen in the internal   mammary chains. No significant extramammary findings appreciated. Impression   1. Left breast, BI-RADS 3, probably benign. 6 to 7 mm enhancing mass   in the lower outer quadrant of the reconstructed left breast status   post mastectomy with autologous muscle reconstruction is most   consistent with a benign finding, and likely a reactive lymph node, or   possibly oil cyst/fat necrosis. Recommendation is short-term follow-up   MRI in 6 months. 2.  No worrisome MRI abnormality seen in the right breast.   Overall assessment BI-RADS 3, probably benign. PHYSICAL EXAM:  The  wounds look good with no evidence of infection, fluid accumulation, or skin necrosis. Her Shana flap looks quite good. It is almost indistinguishable from her right breast which has had a mastopexy. There are no palpable masses, no skin or nipple changes, and no axillary adenopathy. IMPRESSION:    Doing well s/p left nipple sparing mastectomy   Pulmonary embolus on Eliquis    PLAN: Follow-up in 6 months with unilateral right mammogram    Due to her excellent cosmetic result, I think we will have problems convincing radiology not to mammogram her Shana flap. I have seen, examined and reviewed this patient medication list, appropriate labs and imaging studies. I reviewed relevant medical records and others physicians notes. I discussed the plans of care with the patient. I answered all the questions to the patients satisfaction. I, Dr Memo Villegas, personally performed the services described in this documentation as scribed by Davon Bowers MA in my presence and is both accurate and complete. (Please note that portions of this note were completed with a voice recognition program. Efforts were made to edit the dictations but occasionally words are mis-transcribed.)  Over 50% of the total visit time of  20 minutes in face to face encounter with the patient, out of which more than 50% of the time was spent in counseling patient or family and coordination of care.  Counseling included but was not limited to time spent reviewing labs, imaging studies/ treatment plan and answering questions.

## 2022-12-19 ENCOUNTER — OFFICE VISIT (OUTPATIENT)
Dept: SURGERY | Age: 52
End: 2022-12-19
Payer: COMMERCIAL

## 2022-12-19 VITALS — DIASTOLIC BLOOD PRESSURE: 86 MMHG | HEART RATE: 88 BPM | SYSTOLIC BLOOD PRESSURE: 124 MMHG

## 2022-12-19 DIAGNOSIS — R63.5 WEIGHT GAIN: ICD-10-CM

## 2022-12-19 DIAGNOSIS — Z90.12 S/P LEFT MASTECTOMY: ICD-10-CM

## 2022-12-19 DIAGNOSIS — Z13.220 ENCOUNTER FOR SCREENING FOR LIPID DISORDER: ICD-10-CM

## 2022-12-19 DIAGNOSIS — D05.12 DUCTAL CARCINOMA IN SITU (DCIS) OF LEFT BREAST: Primary | ICD-10-CM

## 2022-12-19 DIAGNOSIS — I26.99 BILATERAL PULMONARY EMBOLISM (HCC): ICD-10-CM

## 2022-12-19 LAB
CHOLESTEROL, TOTAL: 211 MG/DL (ref 160–199)
HDLC SERPL-MCNC: 65 MG/DL (ref 65–121)
LDL CHOLESTEROL CALCULATED: 124 MG/DL
TRIGL SERPL-MCNC: 109 MG/DL (ref 0–149)
TSH SERPL DL<=0.05 MIU/L-ACNC: 1.93 UIU/ML (ref 0.27–4.2)

## 2022-12-19 PROCEDURE — 99213 OFFICE O/P EST LOW 20 MIN: CPT | Performed by: SURGERY

## 2022-12-22 DIAGNOSIS — Z12.31 VISIT FOR SCREENING MAMMOGRAM: Primary | ICD-10-CM

## 2023-01-18 DIAGNOSIS — D05.12 INTRADUCTAL CARCINOMA IN SITU OF LEFT BREAST: ICD-10-CM

## 2023-01-18 RX ORDER — LETROZOLE 2.5 MG/1
TABLET, FILM COATED ORAL
Qty: 30 TABLET | Refills: 3 | Status: SHIPPED | OUTPATIENT
Start: 2023-01-18

## 2023-01-18 RX ORDER — LETROZOLE 2.5 MG/1
TABLET, FILM COATED ORAL
Qty: 30 TABLET | Refills: 3 | OUTPATIENT
Start: 2023-01-18

## 2023-02-10 RX ORDER — ERGOCALCIFEROL 1.25 MG/1
CAPSULE ORAL
Qty: 12 CAPSULE | Refills: 0 | Status: SHIPPED | OUTPATIENT
Start: 2023-02-10

## 2023-02-13 ENCOUNTER — OFFICE VISIT (OUTPATIENT)
Dept: PRIMARY CARE CLINIC | Age: 53
End: 2023-02-13
Payer: COMMERCIAL

## 2023-02-13 VITALS
TEMPERATURE: 97.7 F | DIASTOLIC BLOOD PRESSURE: 84 MMHG | RESPIRATION RATE: 18 BRPM | WEIGHT: 189.4 LBS | HEART RATE: 102 BPM | OXYGEN SATURATION: 98 % | SYSTOLIC BLOOD PRESSURE: 122 MMHG | BODY MASS INDEX: 31.56 KG/M2 | HEIGHT: 65 IN

## 2023-02-13 DIAGNOSIS — R30.0 DYSURIA: Primary | ICD-10-CM

## 2023-02-13 DIAGNOSIS — R31.9 HEMATURIA, UNSPECIFIED TYPE: ICD-10-CM

## 2023-02-13 DIAGNOSIS — R82.998 LEUKOCYTES IN URINE: ICD-10-CM

## 2023-02-13 PROBLEM — N65.0 DEFORMITY OF RECONSTRUCTED BREAST: Status: ACTIVE | Noted: 2022-08-10

## 2023-02-13 LAB
APPEARANCE FLUID: NORMAL
BILIRUBIN, POC: NORMAL
BLOOD URINE, POC: NORMAL
CLARITY, POC: NORMAL
COLOR, POC: YELLOW
GLUCOSE URINE, POC: NORMAL
KETONES, POC: NORMAL
LEUKOCYTE EST, POC: NORMAL
NITRITE, POC: NORMAL
PH, POC: 5
PROTEIN, POC: NORMAL
SPECIFIC GRAVITY, POC: 1.03
UROBILINOGEN, POC: NORMAL

## 2023-02-13 PROCEDURE — 99213 OFFICE O/P EST LOW 20 MIN: CPT | Performed by: FAMILY MEDICINE

## 2023-02-13 PROCEDURE — 81002 URINALYSIS NONAUTO W/O SCOPE: CPT | Performed by: FAMILY MEDICINE

## 2023-02-13 RX ORDER — CALCIUM CARBONATE/VITAMIN D3 600 MG-10
2 TABLET ORAL NIGHTLY
COMMUNITY

## 2023-02-13 RX ORDER — TAMSULOSIN HYDROCHLORIDE 0.4 MG/1
0.4 CAPSULE ORAL DAILY
Qty: 10 CAPSULE | Refills: 0 | Status: SHIPPED | OUTPATIENT
Start: 2023-02-13

## 2023-02-13 SDOH — ECONOMIC STABILITY: HOUSING INSECURITY
IN THE LAST 12 MONTHS, WAS THERE A TIME WHEN YOU DID NOT HAVE A STEADY PLACE TO SLEEP OR SLEPT IN A SHELTER (INCLUDING NOW)?: NO

## 2023-02-13 SDOH — ECONOMIC STABILITY: FOOD INSECURITY: WITHIN THE PAST 12 MONTHS, YOU WORRIED THAT YOUR FOOD WOULD RUN OUT BEFORE YOU GOT MONEY TO BUY MORE.: NEVER TRUE

## 2023-02-13 SDOH — ECONOMIC STABILITY: INCOME INSECURITY: HOW HARD IS IT FOR YOU TO PAY FOR THE VERY BASICS LIKE FOOD, HOUSING, MEDICAL CARE, AND HEATING?: NOT HARD AT ALL

## 2023-02-13 SDOH — ECONOMIC STABILITY: FOOD INSECURITY: WITHIN THE PAST 12 MONTHS, THE FOOD YOU BOUGHT JUST DIDN'T LAST AND YOU DIDN'T HAVE MONEY TO GET MORE.: NEVER TRUE

## 2023-02-13 ASSESSMENT — ENCOUNTER SYMPTOMS
DIARRHEA: 0
WHEEZING: 0
CHEST TIGHTNESS: 0
NAUSEA: 1
ABDOMINAL PAIN: 0
BLOOD IN STOOL: 0
VOMITING: 1
SHORTNESS OF BREATH: 0

## 2023-02-13 NOTE — PROGRESS NOTES
Jackelyn Quintero (:  1970) is a 50 y.o. female,Established patient, here for evaluation of the following chief complaint(s):  Dysuria (Pt states she started having pain when urinating on Friday. She started taking cipro that she had left over and it kind of went away and now it has started again. She has noticed that there is blood when she pees. )         ASSESSMENT/PLAN:  1. Dysuria  -     POCT Urinalysis no Micro  -     Culture, Urine  2. Hematuria, unspecified type  -     POCT Urinalysis no Micro  -     Culture, Urine  -     CT ABDOMEN PELVIS W WO CONTRAST Additional Contrast? None; Future  3. Leukocytes in urine  -     Culture, Urine    Very high suspicion for nephrolithiasis given how pain has moved and large blood in urine. Will obtain abdominal CT to better characterize this and size of stone. Discussed with patient if it is above 5 mm we may have to refer her on to urology for further management. Will prescribe tamsulosin at this time to aid in passage of the stone. I did offer pain medication however patient says she has plenty at home and will use this as needed. Did discuss with patient that given CTs take time to schedule it may pass by the time that the CT is completed. I discussed with her if she is able to collect it we will send it off for analysis so we can better see what she needs to avoid so as stones do not recur. Patient encouraged to drink plenty of fluids to aid in passage of stone    Return if symptoms worsen or fail to improve. Subjective   SUBJECTIVE/OBJECTIVE:  Jackelyn Quintero is a 50 y.o. female who presents due to back and abdominal pain ongoing since Friday. Pain used to be more lateral however is now in the lower abdomen on the right side. Patient says she has been taking ciprofloxacin which she had leftover which seemed to help for a while however it seemed to start again today. Patient says that today she also noticed blood in her urine. Patient had similar pain a month ago which spontaneously resolved. Patient has not been able to associate her pain with any particular kind of food. Patient has been having more frequent urination. Patient has history of a prior hysterectomy. Patient denies any history of kidney stones        Dysuria   This is a new problem. The current episode started in the past 7 days. The problem occurs every urination. The quality of the pain is described as aching. The pain is at a severity of 5/10. Associated symptoms include flank pain, frequency, hematuria, nausea and vomiting. Pertinent negatives include no chills or urgency. She has tried antibiotics for the symptoms. There is no history of kidney stones. Review of Systems   Constitutional:  Negative for activity change, chills and fever. HENT:  Negative for congestion. Eyes:  Negative for visual disturbance. Respiratory:  Negative for chest tightness, shortness of breath and wheezing. Cardiovascular:  Negative for chest pain. Gastrointestinal:  Positive for nausea and vomiting. Negative for abdominal pain, blood in stool and diarrhea. Genitourinary:  Positive for dysuria, flank pain, frequency and hematuria. Negative for difficulty urinating, urgency, vaginal bleeding and vaginal discharge. Neurological:  Negative for weakness and headaches. Psychiatric/Behavioral:  Negative for confusion. Objective   Physical Exam  Vitals reviewed. Constitutional:       General: She is not in acute distress. Appearance: Normal appearance. She is not ill-appearing. HENT:      Head: Normocephalic and atraumatic. Cardiovascular:      Rate and Rhythm: Normal rate and regular rhythm. Pulses: Normal pulses. Heart sounds: Normal heart sounds. No murmur heard. Pulmonary:      Effort: Pulmonary effort is normal. No respiratory distress. Breath sounds: Normal breath sounds. No wheezing or rhonchi. Chest:      Chest wall: No tenderness. Abdominal:      General: Abdomen is flat. Bowel sounds are normal. There is no distension. Palpations: Abdomen is soft. Tenderness: There is abdominal tenderness in the right lower quadrant. There is no right CVA tenderness or left CVA tenderness. Negative signs include Gold's sign and McBurney's sign. Musculoskeletal:         General: No swelling. Skin:     General: Skin is warm and dry. Neurological:      General: No focal deficit present. Mental Status: She is alert. Vitals:    02/13/23 1340   BP: 122/84   Pulse: (!) 102   Resp: 18   Temp: 97.7 °F (36.5 °C)   SpO2: 98%        Current Outpatient Medications   Medication Sig Dispense Refill    calcium carb-cholecalciferol 600-10 MG-MCG TABS per tab Take 2 tablets by mouth nightly      calcium citrate-vitamin D (CITRACAL+D) 315-200 MG-UNIT per tablet       tamsulosin (FLOMAX) 0.4 MG capsule Take 1 capsule by mouth daily 10 capsule 0    vitamin D (ERGOCALCIFEROL) 1.25 MG (40871 UT) CAPS capsule TAKE 1 CAPSULE BY MOUTH ONCE A WEEK ON  WEDNESDAY 12 capsule 0    letrozole (FEMARA) 2.5 MG tablet Take 1 tablet by mouth once daily 30 tablet 3    lisinopril (PRINIVIL;ZESTRIL) 5 MG tablet Take 1/2 (one-half) tablet by mouth once daily 45 tablet 0    TART CHERRY ULTRA PO Take by mouth daily      venlafaxine (EFFEXOR XR) 37.5 MG extended release capsule Take 1 capsule by mouth daily 30 capsule 3    loratadine (CLARITIN) 10 MG capsule Take 10 mg by mouth daily      Multiple Vitamin (MULTIVITAMIN ADULT PO) Take 1 tablet by mouth daily       Ascorbic Acid (VITAMIN C) 500 MG CAPS Take 500 mg by mouth daily       Fluticasone Propionate (FLONASE ALLERGY RELIEF NA)       Probiotic Product (PROBIOTIC ADVANCED) CAPS Take 1 capsule by mouth daily       No current facility-administered medications for this visit.       Family History   Problem Relation Age of Onset    Hypertension Mother     No Known Problems Father     Thyroid Disease Sister     Cancer Maternal Great Grandmother         Unknown      Past Medical History:   Diagnosis Date    Breast cancer ([facility] [address]     Cancer ([facility] [address] 2021    DCIS Left Breast      delivery delivered     Ductal carcinoma in situ (DCIS) of left breast 2021    History of D&C     1993    Hx of blood clots     Post op  left Mastectomy Eliquis    Hypertension       Past Surgical History:   Procedure Laterality Date    APPENDECTOMY      BREAST RECONSTRUCTION Left 2022     SECTION      DILATION AND CURETTAGE OF UTERUS      X2    HYSTERECTOMY (CERVIX STATUS UNKNOWN) Bilateral 2022    ROBOTIC TOTAL LAPAROSCOPIC HYSTERECTOMY, BILATERAL SALPINGO-OOPHORECTOMY, CYSTOSCOPY performed by Tete Cotter MD at [facility] Left 2021    LEFT NIPPLE SPARING MASTECTOMY VIA WISE PATTERN, TISSUE EXPANDER RECONSTRUCTION, PEC BLOCK performed by Aure Brown MD at [facility] LEFT Left 2021    US BREAST NEEDLE BIOPSY LEFT 2021 [facility]      No Known Allergies     Lab Results   Component Value Date     05/10/2022    K 3.9 05/10/2022     2022    CO2 29 2022    BUN 13 05/10/2022    CREATININE 0.6 05/10/2022    GLUCOSE 97 05/10/2022    CALCIUM 9.9 2022    PROT 7.6 2022    LABALBU 4.7 2022    BILITOT <0.2 2022    ALKPHOS 138 (H) 2022    AST 31 2022    ALT 38 (H) 2022    LABGLOM >60 05/10/2022    GFRAA >59 05/10/2022        Lab Results   Component Value Date    WBC 7.82 2022    HGB 13.4 2022    HCT 41.3 2022    MCV 95.4 (H) 2022     2022                EMR Dragon/transcription disclaimer:  Much of this encounter note is electronic transcription/translation of spoken language toprinted texts.   The electronic translation of spoken language may be erroneous, or at times, nonsensical words or phrases may be inadvertently transcribed. Although I have reviewed the note for such errors, some may stillexist.      An electronic signature was used to authenticate this note.     --Guadalupe Eisenmenger, MD

## 2023-02-15 LAB — URINE CULTURE, ROUTINE: NORMAL

## 2023-02-16 ENCOUNTER — HOSPITAL ENCOUNTER (OUTPATIENT)
Dept: CT IMAGING | Age: 53
Discharge: HOME | End: 2023-02-16
Payer: COMMERCIAL

## 2023-02-16 ENCOUNTER — TELEPHONE (OUTPATIENT)
Dept: PRIMARY CARE CLINIC | Age: 53
End: 2023-02-16

## 2023-02-16 DIAGNOSIS — R31.9 HEMATURIA, UNSPECIFIED TYPE: ICD-10-CM

## 2023-02-16 PROCEDURE — 74178 CT ABD&PLV WO CNTR FLWD CNTR: CPT

## 2023-02-16 PROCEDURE — 6360000004 HC RX CONTRAST MEDICATION: Performed by: FAMILY MEDICINE

## 2023-02-16 RX ADMIN — IOPAMIDOL 75 ML: 755 INJECTION, SOLUTION INTRAVENOUS at 08:45

## 2023-02-16 NOTE — TELEPHONE ENCOUNTER
Dr. Gonsales Else had called her some Flomax and so we do not have to call her anything unless she needs pain medicine.   Tell her that she should be able to pass it if not and she gets in worse pain go the ER or I will be here all day tomorrow

## 2023-02-16 NOTE — TELEPHONE ENCOUNTER
Pt notified of Kidney stone. Meds can be sent to [facility]. Pt states she does not need pain med. Transition Communication Hand-off for Care Transitions to Next Level of Care Provider    Name: Jono Celeste  MRN #: [id number]  Primary Care Provider: Nelly Julian     Primary Clinic: [id number]  [facility] [address]     Reason for Hospitalization:  Moderate persistent asthma with status asthmaticus [J45.42]  Acute asthma exacerbation [J45.901]  Type 1 diabetes mellitus with hyperglycemia (H) [E10.65]  Right acute serous otitis media, recurrence not specified [H65.01]  Admit Date/Time: 11/8/2017  7:02 AM  Discharge Date: 11/10/17    Payor Source: Payor: Comply[employer] / Plan: [employer] [place] / Product Type: HMO /     Readmission Assessment Measure (NIRALI) Risk Score/category: low      Reason for Communication Hand-off Referral: Fragility    Discharge Plan:     Home      Follow-up plan:  Future Appointments  Date Time Provider Department Center   11/20/2017 4:00 PM Latanya Ramon, PT [facility]   11/27/2017 4:00 PM Latanya Ramon, PT [facility]   12/1/2017 8:15 AM Mayte Mitchell, APRN CNP [facility] [place]   12/4/2017 4:00 PM Latanya Ramon, PT [facility]   12/11/2017 4:00 PM Latanya Ramon, PT [facility]   12/18/2017 4:00 PM Latanya Ramon, PT [facility]       Any outstanding tests or procedures:              Key Recommendations:      Kristel Rader    AVS/Discharge Summary is the source of truth; this is a helpful guide for improved communication of patient story

## 2023-02-22 ENCOUNTER — PATIENT MESSAGE (OUTPATIENT)
Dept: PRIMARY CARE CLINIC | Age: 53
End: 2023-02-22

## 2023-02-22 RX ORDER — NITROFURANTOIN 25; 75 MG/1; MG/1
100 CAPSULE ORAL 2 TIMES DAILY
Qty: 20 CAPSULE | Refills: 0 | Status: SHIPPED | OUTPATIENT
Start: 2023-02-22 | End: 2023-03-04

## 2023-02-22 RX ORDER — NITROFURANTOIN 25; 75 MG/1; MG/1
100 CAPSULE ORAL 2 TIMES DAILY
Qty: 20 CAPSULE | Refills: 0 | Status: SHIPPED | OUTPATIENT
Start: 2023-02-22 | End: 2023-02-22 | Stop reason: SDUPTHER

## 2023-02-28 RX ORDER — LISINOPRIL 5 MG/1
TABLET ORAL
Qty: 45 TABLET | Refills: 1 | Status: SHIPPED | OUTPATIENT
Start: 2023-02-28

## 2023-04-11 ENCOUNTER — HOSPITAL ENCOUNTER (OUTPATIENT)
Dept: INFUSION THERAPY | Age: 53
Discharge: HOME | End: 2023-04-11
Payer: COMMERCIAL

## 2023-04-11 DIAGNOSIS — D05.12 INTRADUCTAL CARCINOMA IN SITU OF LEFT BREAST: ICD-10-CM

## 2023-04-11 LAB
BASOPHILS # BLD: 0.07 K/UL (ref 0.01–0.08)
BASOPHILS NFR BLD: 0.8 % (ref 0.1–1.2)
EOSINOPHIL # BLD: 0.23 K/UL (ref 0.04–0.54)
EOSINOPHIL NFR BLD: 2.5 % (ref 0.7–7)
ERYTHROCYTE [DISTWIDTH] IN BLOOD BY AUTOMATED COUNT: 13.4 % (ref 11.7–14.4)
HCT VFR BLD AUTO: 41.1 % (ref 34.1–44.9)
HGB BLD-MCNC: 13.1 G/DL (ref 11.2–15.7)
LYMPHOCYTES # BLD: 2.1 K/UL (ref 1.18–3.74)
LYMPHOCYTES NFR BLD: 22.9 % (ref 19.3–53.1)
MCH RBC QN AUTO: 30.6 PG (ref 25.6–32.2)
MCHC RBC AUTO-ENTMCNC: 31.9 G/DL (ref 32.3–35.5)
MCV RBC AUTO: 96 FL (ref 79.4–94.8)
MONOCYTES # BLD: 0.64 K/UL (ref 0.24–0.82)
MONOCYTES NFR BLD: 7 % (ref 4.7–12.5)
NEUTROPHILS # BLD: 6.12 K/UL (ref 1.56–6.13)
NEUTS SEG NFR BLD: 66.6 % (ref 34–71.1)
PLATELET # BLD AUTO: 182 K/UL (ref 182–369)
PMV BLD AUTO: 10.5 FL (ref 7.4–10.4)
RBC # BLD AUTO: 4.28 M/UL (ref 3.93–5.22)
WBC # BLD AUTO: 9.18 K/UL (ref 3.98–10.04)

## 2023-04-11 PROCEDURE — 99211 OFF/OP EST MAY X REQ PHY/QHP: CPT

## 2023-04-11 PROCEDURE — 36415 COLL VENOUS BLD VENIPUNCTURE: CPT

## 2023-04-11 PROCEDURE — 85025 COMPLETE CBC W/AUTO DIFF WBC: CPT

## 2023-05-15 DIAGNOSIS — D05.12 INTRADUCTAL CARCINOMA IN SITU OF LEFT BREAST: ICD-10-CM

## 2023-05-15 RX ORDER — ERGOCALCIFEROL 1.25 MG/1
CAPSULE ORAL
Qty: 12 CAPSULE | Refills: 0 | Status: SHIPPED | OUTPATIENT
Start: 2023-05-15

## 2023-05-15 RX ORDER — LETROZOLE 2.5 MG/1
TABLET, FILM COATED ORAL
Qty: 30 TABLET | Refills: 0 | Status: SHIPPED | OUTPATIENT
Start: 2023-05-15

## 2023-06-01 ENCOUNTER — HOSPITAL ENCOUNTER (OUTPATIENT)
Dept: WOMENS IMAGING | Age: 53
Discharge: HOME | End: 2023-06-01
Payer: COMMERCIAL

## 2023-06-01 DIAGNOSIS — Z12.31 VISIT FOR SCREENING MAMMOGRAM: ICD-10-CM

## 2023-06-01 PROCEDURE — 77067 SCR MAMMO BI INCL CAD: CPT | Performed by: GENERAL PRACTICE

## 2023-06-01 PROCEDURE — 77063 BREAST TOMOSYNTHESIS BI: CPT

## 2023-06-19 DIAGNOSIS — D05.12 INTRADUCTAL CARCINOMA IN SITU OF LEFT BREAST: ICD-10-CM

## 2023-06-19 RX ORDER — LETROZOLE 2.5 MG/1
TABLET, FILM COATED ORAL
Qty: 30 TABLET | Refills: 0 | Status: SHIPPED | OUTPATIENT
Start: 2023-06-19

## 2023-07-11 DIAGNOSIS — D05.12 INTRADUCTAL CARCINOMA IN SITU OF LEFT BREAST: ICD-10-CM

## 2023-07-11 RX ORDER — LETROZOLE 2.5 MG/1
TABLET, FILM COATED ORAL
Qty: 30 TABLET | Refills: 0 | Status: SHIPPED | OUTPATIENT
Start: 2023-07-11

## 2023-07-20 ENCOUNTER — TELEPHONE (OUTPATIENT)
Dept: HEMATOLOGY | Age: 53
End: 2023-07-20

## 2023-07-20 NOTE — TELEPHONE ENCOUNTER
Called patient and informed that ENRIQUE Briones agrees with taking a 3 week break from the letrozole. Instructed that the numbness in the hands may not be related to the letrozole but being off of it for 3 weeks will let us know. Instructed to call with any problems.  Patient v/u

## 2023-09-05 RX ORDER — LISINOPRIL 5 MG/1
TABLET ORAL
Qty: 45 TABLET | Refills: 0 | Status: SHIPPED | OUTPATIENT
Start: 2023-09-05

## 2023-10-14 DIAGNOSIS — R23.2 HOT FLASHES RELATED TO AROMATASE INHIBITOR THERAPY: ICD-10-CM

## 2023-10-14 DIAGNOSIS — T45.1X5A HOT FLASHES RELATED TO AROMATASE INHIBITOR THERAPY: ICD-10-CM

## 2023-10-16 RX ORDER — VENLAFAXINE HYDROCHLORIDE 75 MG/1
75 CAPSULE, EXTENDED RELEASE ORAL DAILY
Qty: 30 CAPSULE | Refills: 5 | Status: SHIPPED | OUTPATIENT
Start: 2023-10-16

## 2023-11-03 ENCOUNTER — OFFICE VISIT (OUTPATIENT)
Dept: PRIMARY CARE CLINIC | Age: 53
End: 2023-11-03
Payer: COMMERCIAL

## 2023-11-03 VITALS
RESPIRATION RATE: 16 BRPM | BODY MASS INDEX: 31.82 KG/M2 | TEMPERATURE: 97.6 F | OXYGEN SATURATION: 97 % | DIASTOLIC BLOOD PRESSURE: 70 MMHG | SYSTOLIC BLOOD PRESSURE: 92 MMHG | WEIGHT: 198 LBS | HEART RATE: 70 BPM | HEIGHT: 66 IN

## 2023-11-03 DIAGNOSIS — Z20.818 STREP THROAT EXPOSURE: ICD-10-CM

## 2023-11-03 DIAGNOSIS — J02.9 SORE THROAT: Primary | ICD-10-CM

## 2023-11-03 LAB — S PYO AG THROAT QL: NORMAL

## 2023-11-03 PROCEDURE — 99213 OFFICE O/P EST LOW 20 MIN: CPT | Performed by: NURSE PRACTITIONER

## 2023-11-03 RX ORDER — METHYLPREDNISOLONE 4 MG/1
TABLET ORAL
Qty: 1 KIT | Refills: 0 | Status: SHIPPED | OUTPATIENT
Start: 2023-11-03 | End: 2023-11-09

## 2023-11-03 RX ORDER — AMOXICILLIN 875 MG/1
875 TABLET, COATED ORAL 2 TIMES DAILY
Qty: 20 TABLET | Refills: 0 | Status: SHIPPED | OUTPATIENT
Start: 2023-11-03 | End: 2023-11-13

## 2023-11-03 ASSESSMENT — ENCOUNTER SYMPTOMS
GASTROINTESTINAL NEGATIVE: 1
SORE THROAT: 1

## 2023-11-27 ENCOUNTER — TELEPHONE (OUTPATIENT)
Dept: HEMATOLOGY | Age: 53
End: 2023-11-27

## 2023-11-27 DIAGNOSIS — D05.12 INTRADUCTAL CARCINOMA IN SITU OF LEFT BREAST: Primary | ICD-10-CM

## 2023-11-27 NOTE — TELEPHONE ENCOUNTER
Called patient to remind them of their appointment on 11/28/23. Detailed voicemail was left with appointment date and time.

## 2023-11-28 ENCOUNTER — HOSPITAL ENCOUNTER (OUTPATIENT)
Dept: INFUSION THERAPY | Age: 53
Discharge: HOME | End: 2023-11-28
Payer: COMMERCIAL

## 2023-11-28 DIAGNOSIS — D05.12 INTRADUCTAL CARCINOMA IN SITU OF LEFT BREAST: ICD-10-CM

## 2023-11-28 LAB
BASOPHILS # BLD: 0.06 K/UL (ref 0.01–0.08)
BASOPHILS NFR BLD: 0.7 % (ref 0.1–1.2)
EOSINOPHIL # BLD: 0.25 K/UL (ref 0.04–0.54)
EOSINOPHIL NFR BLD: 2.8 % (ref 0.7–7)
ERYTHROCYTE [DISTWIDTH] IN BLOOD BY AUTOMATED COUNT: 13.2 % (ref 11.7–14.4)
HCT VFR BLD AUTO: 37.2 % (ref 34.1–44.9)
HGB BLD-MCNC: 12.5 G/DL (ref 11.2–15.7)
LYMPHOCYTES # BLD: 2.02 K/UL (ref 1.18–3.74)
LYMPHOCYTES NFR BLD: 22.5 % (ref 19.3–53.1)
MCH RBC QN AUTO: 30.5 PG (ref 25.6–32.2)
MCHC RBC AUTO-ENTMCNC: 33.6 G/DL (ref 32.3–35.5)
MCV RBC AUTO: 90.7 FL (ref 79.4–94.8)
MONOCYTES # BLD: 0.75 K/UL (ref 0.24–0.82)
MONOCYTES NFR BLD: 8.4 % (ref 4.7–12.5)
NEUTROPHILS # BLD: 5.85 K/UL (ref 1.56–6.13)
NEUTS SEG NFR BLD: 65.2 % (ref 34–71.1)
PLATELET # BLD AUTO: 308 K/UL (ref 182–369)
PMV BLD AUTO: 9.4 FL (ref 7.4–10.4)
RBC # BLD AUTO: 4.1 M/UL (ref 3.93–5.22)
WBC # BLD AUTO: 8.97 K/UL (ref 3.98–10.04)

## 2023-11-28 PROCEDURE — 36415 COLL VENOUS BLD VENIPUNCTURE: CPT

## 2023-11-28 PROCEDURE — 85025 COMPLETE CBC W/AUTO DIFF WBC: CPT

## 2023-11-28 PROCEDURE — 99211 OFF/OP EST MAY X REQ PHY/QHP: CPT

## 2023-12-20 PROBLEM — I10 PRIMARY HYPERTENSION: Status: ACTIVE | Noted: 2023-12-20

## 2024-01-13 DIAGNOSIS — D05.12 INTRADUCTAL CARCINOMA IN SITU OF LEFT BREAST: ICD-10-CM

## 2024-01-14 RX ORDER — LETROZOLE 2.5 MG/1
TABLET, FILM COATED ORAL
Qty: 30 TABLET | Refills: 3 | Status: SHIPPED | OUTPATIENT
Start: 2024-01-14

## 2024-01-29 ENCOUNTER — TELEPHONE (OUTPATIENT)
Dept: HEMATOLOGY | Age: 54
End: 2024-01-29

## 2024-01-29 NOTE — TELEPHONE ENCOUNTER
Called pt. to remind them of appointment on 1/31/2024 and had to leave a detailed voicemail with appointment date and time.

## 2024-01-30 DIAGNOSIS — E55.9 VITAMIN D DEFICIENCY: ICD-10-CM

## 2024-01-30 DIAGNOSIS — D05.12 DUCTAL CARCINOMA IN SITU (DCIS) OF LEFT BREAST: Primary | ICD-10-CM

## 2024-01-30 NOTE — PROGRESS NOTES
Component Value Date    WBC 8.70 01/31/2024    HGB 12.3 01/31/2024    HCT 37.3 01/31/2024    MCV 93.0 01/31/2024     01/31/2024     Lab Results   Component Value Date    NEUTROABS 5.80 01/31/2024         ASSESSMENT/PLAN:      1. 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  Current recommendation is for preventive endocrine therapy with Femara 2.5 mg daily for 5 years, through May 2027.    Reports compliant with Femara.  No new bilateral reconstructed breast complaints.    06/01/2023 Right breast mammogram- There is no mass, area of architectural distortion, or collection of microcalcifications suspicious for malignancy.    -Continue adjuvant Femara 2.5 mg p.o. daily for anticipated 5 years: Continue to decrease risk of recurrence of breast cancer  -Encouraged self exams  - at 4.5-year rudi (December 2026) will consider obtaining breast cancer index study to see if any benefit would be from an additional 5 years of adjuvant endocrine therapy.    Bone health:  06/02/2022 Bone Density- normal; lumbar spine T score -0.7; left femoral neck T score -0.1. Vitamin D level of 57.1 on 05/19/2022.    Reports compliant with calcium and vitamin D supplement.    -Continue calcium 1200 mg with vitamin D 1000 units daily    2.  Encounter for monitoring letrozole/Femara  -Hot flashes are currently tolerable and rarely occur, continues to take Effexor to 75 mg p.o. daily as prescribed.  -Reports depression and mood swings have significantly improved since initiating Effexor, no changes and continues to report doing well  -Noted hair thinning, no change from previous exam  -Chronic arthralgias, persistent in hands  -Complains of experiencing 1 severe sharp headache and now continues to have dull headache, CT SCAN of the head and declined, if headaches are persistent will call and request CT of the head    3.  History of provoked bilateral pulmonary embolism, 8/6/2021.  JAK2 [id number] flex

## 2024-01-31 ENCOUNTER — HOSPITAL ENCOUNTER (OUTPATIENT)
Dept: INFUSION THERAPY | Age: 54
Discharge: HOME | End: 2024-01-31
Payer: COMMERCIAL

## 2024-01-31 ENCOUNTER — OFFICE VISIT (OUTPATIENT)
Dept: HEMATOLOGY | Age: 54
End: 2024-01-31
Payer: COMMERCIAL

## 2024-01-31 VITALS
WEIGHT: 199 LBS | HEART RATE: 92 BPM | TEMPERATURE: 98.7 F | DIASTOLIC BLOOD PRESSURE: 84 MMHG | SYSTOLIC BLOOD PRESSURE: 132 MMHG | HEIGHT: 65 IN | BODY MASS INDEX: 33.15 KG/M2 | OXYGEN SATURATION: 97 %

## 2024-01-31 DIAGNOSIS — D05.12 DUCTAL CARCINOMA IN SITU (DCIS) OF LEFT BREAST: ICD-10-CM

## 2024-01-31 DIAGNOSIS — D05.12 INTRADUCTAL CARCINOMA IN SITU OF LEFT BREAST: ICD-10-CM

## 2024-01-31 DIAGNOSIS — D05.12 INTRADUCTAL CARCINOMA IN SITU OF LEFT BREAST: Primary | ICD-10-CM

## 2024-01-31 DIAGNOSIS — Z86.711 PERSONAL HISTORY OF PULMONARY EMBOLISM: ICD-10-CM

## 2024-01-31 DIAGNOSIS — R23.2 HOT FLASHES RELATED TO AROMATASE INHIBITOR THERAPY: ICD-10-CM

## 2024-01-31 DIAGNOSIS — E55.9 VITAMIN D DEFICIENCY: ICD-10-CM

## 2024-01-31 DIAGNOSIS — T45.1X5A HOT FLASHES RELATED TO AROMATASE INHIBITOR THERAPY: ICD-10-CM

## 2024-01-31 DIAGNOSIS — Z51.81 ENCOUNTER FOR MONITORING ADJUVANT HORMONAL THERAPY: ICD-10-CM

## 2024-01-31 DIAGNOSIS — Z79.899 ENCOUNTER FOR MONITORING ADJUVANT HORMONAL THERAPY: ICD-10-CM

## 2024-01-31 LAB
25(OH)D3 SERPL-MCNC: 61.3 NG/ML
ALBUMIN SERPL-MCNC: 4.5 G/DL (ref 3.5–5.2)
ALP SERPL-CCNC: 117 U/L (ref 35–104)
ALT SERPL-CCNC: 31 U/L (ref 9–52)
ANION GAP SERPL CALCULATED.3IONS-SCNC: 10 MMOL/L (ref 7–19)
AST SERPL-CCNC: 34 U/L (ref 14–36)
BASOPHILS # BLD: 0.05 K/UL (ref 0.01–0.08)
BASOPHILS NFR BLD: 0.6 % (ref 0.1–1.2)
BILIRUB SERPL-MCNC: <0.2 MG/DL (ref 0.2–1.3)
BUN SERPL-MCNC: 19 MG/DL (ref 7–17)
CALCIUM SERPL-MCNC: 8.6 MG/DL (ref 8.4–10.2)
CHLORIDE SERPL-SCNC: 102 MMOL/L (ref 98–111)
CO2 SERPL-SCNC: 31 MMOL/L (ref 22–29)
CREAT SERPL-MCNC: 0.8 MG/DL (ref 0.5–1)
EOSINOPHIL # BLD: 0.23 K/UL (ref 0.04–0.54)
EOSINOPHIL NFR BLD: 2.6 % (ref 0.7–7)
ERYTHROCYTE [DISTWIDTH] IN BLOOD BY AUTOMATED COUNT: 13.2 % (ref 11.7–14.4)
GLOBULIN: 3.3 G/DL
GLUCOSE SERPL-MCNC: 113 MG/DL (ref 74–106)
HCT VFR BLD AUTO: 37.3 % (ref 34.1–44.9)
HGB BLD-MCNC: 12.3 G/DL (ref 11.2–15.7)
LYMPHOCYTES # BLD: 2.01 K/UL (ref 1.18–3.74)
LYMPHOCYTES NFR BLD: 23.1 % (ref 19.3–53.1)
MCH RBC QN AUTO: 30.7 PG (ref 25.6–32.2)
MCHC RBC AUTO-ENTMCNC: 33 G/DL (ref 32.3–35.5)
MCV RBC AUTO: 93 FL (ref 79.4–94.8)
MONOCYTES # BLD: 0.59 K/UL (ref 0.24–0.82)
MONOCYTES NFR BLD: 6.8 % (ref 4.7–12.5)
NEUTROPHILS # BLD: 5.8 K/UL (ref 1.56–6.13)
NEUTS SEG NFR BLD: 66.7 % (ref 34–71.1)
PLATELET # BLD AUTO: 305 K/UL (ref 182–369)
PMV BLD AUTO: 9.4 FL (ref 7.4–10.4)
POTASSIUM SERPL-SCNC: 4.4 MMOL/L (ref 3.5–5.1)
PROT SERPL-MCNC: 7.8 G/DL (ref 6.3–8.2)
RBC # BLD AUTO: 4.01 M/UL (ref 3.93–5.22)
SODIUM SERPL-SCNC: 143 MMOL/L (ref 137–145)
WBC # BLD AUTO: 8.7 K/UL (ref 3.98–10.04)

## 2024-01-31 PROCEDURE — 3079F DIAST BP 80-89 MM HG: CPT | Performed by: NURSE PRACTITIONER

## 2024-01-31 PROCEDURE — 80053 COMPREHEN METABOLIC PANEL: CPT

## 2024-01-31 PROCEDURE — 3075F SYST BP GE 130 - 139MM HG: CPT | Performed by: NURSE PRACTITIONER

## 2024-01-31 PROCEDURE — 99214 OFFICE O/P EST MOD 30 MIN: CPT | Performed by: NURSE PRACTITIONER

## 2024-01-31 PROCEDURE — 36415 COLL VENOUS BLD VENIPUNCTURE: CPT

## 2024-01-31 PROCEDURE — 99212 OFFICE O/P EST SF 10 MIN: CPT

## 2024-01-31 PROCEDURE — 85025 COMPLETE CBC W/AUTO DIFF WBC: CPT

## 2024-02-02 ASSESSMENT — ENCOUNTER SYMPTOMS
RESPIRATORY NEGATIVE: 1
EYE PAIN: 0
VOMITING: 0
SORE THROAT: 0
EYES NEGATIVE: 1
CONSTIPATION: 0
EYE DISCHARGE: 0
ABDOMINAL PAIN: 0
WHEEZING: 0
DIARRHEA: 0
BACK PAIN: 0
NAUSEA: 0
COUGH: 0
BLOOD IN STOOL: 0
SHORTNESS OF BREATH: 0
GASTROINTESTINAL NEGATIVE: 1
EYE REDNESS: 0

## 2024-04-13 DIAGNOSIS — T45.1X5A HOT FLASHES RELATED TO AROMATASE INHIBITOR THERAPY: ICD-10-CM

## 2024-04-13 DIAGNOSIS — R23.2 HOT FLASHES RELATED TO AROMATASE INHIBITOR THERAPY: ICD-10-CM

## 2024-04-15 RX ORDER — VENLAFAXINE HYDROCHLORIDE 75 MG/1
75 CAPSULE, EXTENDED RELEASE ORAL DAILY
Qty: 30 CAPSULE | Refills: 0 | Status: SHIPPED | OUTPATIENT
Start: 2024-04-15

## 2024-04-17 ENCOUNTER — OFFICE VISIT (OUTPATIENT)
Dept: PRIMARY CARE CLINIC | Age: 54
End: 2024-04-17
Payer: COMMERCIAL

## 2024-04-17 VITALS
SYSTOLIC BLOOD PRESSURE: 124 MMHG | RESPIRATION RATE: 18 BRPM | HEART RATE: 89 BPM | OXYGEN SATURATION: 100 % | BODY MASS INDEX: 31.98 KG/M2 | HEIGHT: 66 IN | TEMPERATURE: 97.2 F | WEIGHT: 199 LBS | DIASTOLIC BLOOD PRESSURE: 80 MMHG

## 2024-04-17 DIAGNOSIS — R50.9 FEVER, UNSPECIFIED FEVER CAUSE: ICD-10-CM

## 2024-04-17 DIAGNOSIS — J06.9 UPPER RESPIRATORY TRACT INFECTION, UNSPECIFIED TYPE: Primary | ICD-10-CM

## 2024-04-17 DIAGNOSIS — J02.9 SORE THROAT: ICD-10-CM

## 2024-04-17 LAB
INFLUENZA A ANTIBODY: NORMAL
INFLUENZA B ANTIBODY: NORMAL
S PYO AG THROAT QL: NORMAL

## 2024-04-17 PROCEDURE — 99213 OFFICE O/P EST LOW 20 MIN: CPT | Performed by: NURSE PRACTITIONER

## 2024-04-17 PROCEDURE — 87880 STREP A ASSAY W/OPTIC: CPT | Performed by: NURSE PRACTITIONER

## 2024-04-17 PROCEDURE — 3074F SYST BP LT 130 MM HG: CPT | Performed by: NURSE PRACTITIONER

## 2024-04-17 PROCEDURE — 96372 THER/PROPH/DIAG INJ SC/IM: CPT | Performed by: NURSE PRACTITIONER

## 2024-04-17 PROCEDURE — 3079F DIAST BP 80-89 MM HG: CPT | Performed by: NURSE PRACTITIONER

## 2024-04-17 PROCEDURE — 87804 INFLUENZA ASSAY W/OPTIC: CPT | Performed by: NURSE PRACTITIONER

## 2024-04-17 RX ORDER — AMOXICILLIN 875 MG/1
875 TABLET, COATED ORAL 2 TIMES DAILY
Qty: 20 TABLET | Refills: 0 | Status: SHIPPED | OUTPATIENT
Start: 2024-04-17 | End: 2024-04-27

## 2024-04-17 RX ORDER — TRIAMCINOLONE ACETONIDE 40 MG/ML
40 INJECTION, SUSPENSION INTRA-ARTICULAR; INTRAMUSCULAR ONCE
Status: COMPLETED | OUTPATIENT
Start: 2024-04-17 | End: 2024-04-17

## 2024-04-17 RX ORDER — METHYLPREDNISOLONE 4 MG/1
TABLET ORAL
Qty: 1 KIT | Refills: 0 | Status: SHIPPED | OUTPATIENT
Start: 2024-04-17 | End: 2024-04-23

## 2024-04-17 RX ADMIN — TRIAMCINOLONE ACETONIDE 40 MG: 40 INJECTION, SUSPENSION INTRA-ARTICULAR; INTRAMUSCULAR at 11:53

## 2024-04-17 ASSESSMENT — PATIENT HEALTH QUESTIONNAIRE - PHQ9
2. FEELING DOWN, DEPRESSED OR HOPELESS: NOT AT ALL
10. IF YOU CHECKED OFF ANY PROBLEMS, HOW DIFFICULT HAVE THESE PROBLEMS MADE IT FOR YOU TO DO YOUR WORK, TAKE CARE OF THINGS AT HOME, OR GET ALONG WITH OTHER PEOPLE: NOT DIFFICULT AT ALL
6. FEELING BAD ABOUT YOURSELF - OR THAT YOU ARE A FAILURE OR HAVE LET YOURSELF OR YOUR FAMILY DOWN: NOT AT ALL
1. LITTLE INTEREST OR PLEASURE IN DOING THINGS: NOT AT ALL
SUM OF ALL RESPONSES TO PHQ QUESTIONS 1-9: 0
8. MOVING OR SPEAKING SO SLOWLY THAT OTHER PEOPLE COULD HAVE NOTICED. OR THE OPPOSITE, BEING SO FIGETY OR RESTLESS THAT YOU HAVE BEEN MOVING AROUND A LOT MORE THAN USUAL: NOT AT ALL
9. THOUGHTS THAT YOU WOULD BE BETTER OFF DEAD, OR OF HURTING YOURSELF: NOT AT ALL
3. TROUBLE FALLING OR STAYING ASLEEP: NOT AT ALL
5. POOR APPETITE OR OVEREATING: NOT AT ALL
SUM OF ALL RESPONSES TO PHQ9 QUESTIONS 1 & 2: 0
SUM OF ALL RESPONSES TO PHQ QUESTIONS 1-9: 0
7. TROUBLE CONCENTRATING ON THINGS, SUCH AS READING THE NEWSPAPER OR WATCHING TELEVISION: NOT AT ALL
SUM OF ALL RESPONSES TO PHQ QUESTIONS 1-9: 0
4. FEELING TIRED OR HAVING LITTLE ENERGY: NOT AT ALL
SUM OF ALL RESPONSES TO PHQ QUESTIONS 1-9: 0

## 2024-04-17 ASSESSMENT — ENCOUNTER SYMPTOMS: COUGH: 1

## 2024-04-17 NOTE — PATIENT INSTRUCTIONS
Steroid can start tomorrow since shot today  May start the antibiotics if throat not better  Mucinex and lots of liquids to thin secretion  Sudaphed for decongestant  Call if you need cough meds.

## 2024-04-17 NOTE — PROGRESS NOTES
[facility]  [address] [place] [address]  Dept: [phone]  Dept Fax: [phone]  Loc: [phone]    Desiree Gimenez is a 53 y.o. female who presents today for her medical conditions/complaints as noted below.  Desiree Gimenez is c/o of Pharyngitis (Started yesterday), Fever, and Nasal Congestion        HPI:     HPI   Chief Complaint   Patient presents with    Pharyngitis     Started yesterday    Fever    Nasal Congestion   She is a  and exposed to several illnesses.  Her throat is sore and she has had a fever.  Past Medical History:   Diagnosis Date    Breast cancer (HCC)     Cancer (HCC) 2021    DCIS Left Breast      delivery delivered     Ductal carcinoma in situ (DCIS) of left breast 2021    History of D&C     1993    Hx of blood clots     Post op  left Mastectomy Eliquis    Hypertension       Past Surgical History:   Procedure Laterality Date    APPENDECTOMY      BREAST RECONSTRUCTION Left 2022     SECTION      DILATION AND CURETTAGE OF UTERUS      X2    HYSTERECTOMY (CERVIX STATUS UNKNOWN) Bilateral 2022    ROBOTIC TOTAL LAPAROSCOPIC HYSTERECTOMY, BILATERAL SALPINGO-OOPHORECTOMY, CYSTOSCOPY performed by Adelaida Maier MD at [facility] OR    HYSTERECTOMY, VAGINAL      MASTECTOMY Left 2021    LEFT NIPPLE SPARING MASTECTOMY VIA WISE PATTERN, TISSUE EXPANDER RECONSTRUCTION, PEC BLOCK performed by Gilmar Quevedo MD at [facility] OR    OVARY REMOVAL      US BREAST BIOPSY W LOC DEVICE 1ST LESION LEFT Left 2021    US BREAST NEEDLE BIOPSY LEFT 2021 [facility] GENERAL SURGERY           2024    11:23 AM 2024     2:42 PM 2023    10:50 AM 2023     1:51 PM 11/3/2023     3:18 PM 2023     2:06 PM   Vitals   SYSTOLIC 124 132 134 132 92 118   DIASTOLIC 80 84 86 74 70 68   Site      Right Upper Arm   Position      Sitting   Cuff Size      Medium Adult   Pulse 89 92 73 103 70

## 2024-04-25 ENCOUNTER — TELEPHONE (OUTPATIENT)
Dept: HEMATOLOGY | Age: 54
End: 2024-04-25

## 2024-04-25 NOTE — TELEPHONE ENCOUNTER
Called Patient and reminded patient of their appointment on 04/30/2024 and patient confirmed they would be here.

## 2024-04-30 ENCOUNTER — HOSPITAL ENCOUNTER (OUTPATIENT)
Dept: INFUSION THERAPY | Age: 54
Discharge: HOME | End: 2024-04-30
Payer: COMMERCIAL

## 2024-04-30 ENCOUNTER — OFFICE VISIT (OUTPATIENT)
Dept: HEMATOLOGY | Age: 54
End: 2024-04-30
Payer: COMMERCIAL

## 2024-04-30 VITALS
TEMPERATURE: 97.9 F | WEIGHT: 205.1 LBS | HEIGHT: 66 IN | SYSTOLIC BLOOD PRESSURE: 118 MMHG | OXYGEN SATURATION: 98 % | DIASTOLIC BLOOD PRESSURE: 68 MMHG | HEART RATE: 99 BPM | BODY MASS INDEX: 32.96 KG/M2

## 2024-04-30 DIAGNOSIS — Z86.711 PERSONAL HISTORY OF PULMONARY EMBOLISM: ICD-10-CM

## 2024-04-30 DIAGNOSIS — Z51.81 ENCOUNTER FOR MONITORING ADJUVANT HORMONAL THERAPY: ICD-10-CM

## 2024-04-30 DIAGNOSIS — T45.1X5A HOT FLASHES RELATED TO AROMATASE INHIBITOR THERAPY: ICD-10-CM

## 2024-04-30 DIAGNOSIS — Z79.899 ENCOUNTER FOR MONITORING ADJUVANT HORMONAL THERAPY: ICD-10-CM

## 2024-04-30 DIAGNOSIS — Z78.0 POSTMENOPAUSAL: ICD-10-CM

## 2024-04-30 DIAGNOSIS — D05.12 INTRADUCTAL CARCINOMA IN SITU OF LEFT BREAST: ICD-10-CM

## 2024-04-30 DIAGNOSIS — R23.2 HOT FLASHES RELATED TO AROMATASE INHIBITOR THERAPY: ICD-10-CM

## 2024-04-30 DIAGNOSIS — D05.12 INTRADUCTAL CARCINOMA IN SITU OF LEFT BREAST: Primary | ICD-10-CM

## 2024-04-30 DIAGNOSIS — Z79.811 LONG TERM (CURRENT) USE OF AROMATASE INHIBITORS: ICD-10-CM

## 2024-04-30 LAB
ALBUMIN SERPL-MCNC: 4.6 G/DL (ref 3.5–5.2)
ALP SERPL-CCNC: 117 U/L (ref 35–104)
ALT SERPL-CCNC: 25 U/L (ref 9–52)
ANION GAP SERPL CALCULATED.3IONS-SCNC: 10 MMOL/L (ref 7–19)
AST SERPL-CCNC: 24 U/L (ref 14–36)
BASOPHILS # BLD: 0.06 K/UL (ref 0.01–0.08)
BASOPHILS NFR BLD: 0.5 % (ref 0.1–1.2)
BILIRUB SERPL-MCNC: 0.4 MG/DL (ref 0.2–1.3)
BUN SERPL-MCNC: 14 MG/DL (ref 7–17)
CALCIUM SERPL-MCNC: 9.2 MG/DL (ref 8.4–10.2)
CHLORIDE SERPL-SCNC: 102 MMOL/L (ref 98–111)
CO2 SERPL-SCNC: 26 MMOL/L (ref 22–29)
CREAT SERPL-MCNC: 0.7 MG/DL (ref 0.5–1)
EOSINOPHIL # BLD: 0.16 K/UL (ref 0.04–0.54)
EOSINOPHIL NFR BLD: 1.4 % (ref 0.7–7)
ERYTHROCYTE [DISTWIDTH] IN BLOOD BY AUTOMATED COUNT: 13.5 % (ref 11.7–14.4)
GLOBULIN: 2.6 G/DL
GLUCOSE SERPL-MCNC: 115 MG/DL (ref 74–106)
HCT VFR BLD AUTO: 39 % (ref 34.1–44.9)
HGB BLD-MCNC: 13.1 G/DL (ref 11.2–15.7)
LYMPHOCYTES # BLD: 2.94 K/UL (ref 1.18–3.74)
LYMPHOCYTES NFR BLD: 24.8 % (ref 19.3–53.1)
MCH RBC QN AUTO: 31.3 PG (ref 25.6–32.2)
MCHC RBC AUTO-ENTMCNC: 33.6 G/DL (ref 32.3–35.5)
MCV RBC AUTO: 93.3 FL (ref 79.4–94.8)
MONOCYTES # BLD: 0.73 K/UL (ref 0.24–0.82)
MONOCYTES NFR BLD: 6.2 % (ref 4.7–12.5)
NEUTROPHILS # BLD: 7.91 K/UL (ref 1.56–6.13)
NEUTS SEG NFR BLD: 66.8 % (ref 34–71.1)
PLATELET # BLD AUTO: 302 K/UL (ref 182–369)
PMV BLD AUTO: 9.3 FL (ref 7.4–10.4)
POTASSIUM SERPL-SCNC: 4 MMOL/L (ref 3.5–5.1)
PROT SERPL-MCNC: 7.2 G/DL (ref 6.3–8.2)
RBC # BLD AUTO: 4.18 M/UL (ref 3.93–5.22)
SODIUM SERPL-SCNC: 138 MMOL/L (ref 137–145)
WBC # BLD AUTO: 11.84 K/UL (ref 3.98–10.04)

## 2024-04-30 PROCEDURE — 3078F DIAST BP <80 MM HG: CPT | Performed by: NURSE PRACTITIONER

## 2024-04-30 PROCEDURE — 99212 OFFICE O/P EST SF 10 MIN: CPT

## 2024-04-30 PROCEDURE — 99214 OFFICE O/P EST MOD 30 MIN: CPT | Performed by: NURSE PRACTITIONER

## 2024-04-30 PROCEDURE — 80053 COMPREHEN METABOLIC PANEL: CPT

## 2024-04-30 PROCEDURE — 85025 COMPLETE CBC W/AUTO DIFF WBC: CPT

## 2024-04-30 PROCEDURE — 36415 COLL VENOUS BLD VENIPUNCTURE: CPT

## 2024-04-30 PROCEDURE — 3074F SYST BP LT 130 MM HG: CPT | Performed by: NURSE PRACTITIONER

## 2024-04-30 NOTE — PROGRESS NOTES
Progress Note      Pt Name: Desiree Gimenez  YOB: 1970  MRN: [id number]    Date of evaluation: 04/30/2024  History Obtained From:  patient, electronic medical record    CHIEF COMPLAINT:    Chief Complaint   Patient presents with    Follow-up     Intraductal carcinoma in situ of left breast       HISTORY OF PRESENT ILLNESS:    Desiree Gimenez is a 54 y.o.  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  She is status post left sparing mastectomy with reconstruction and total laparoscopic hysterectomy and bilateral salpingo-oophorectomy.  Current recommendation is for preventive therapy with Femara 2.5 mg p.o. daily for 5 years, through May 2027.  Desiree returns today in scheduled follow-up for evaluation, lab monitoring, side effect monitoring and further treatment recommendations.      Today's clinic visit to include physical assessment, review of systems, any radiograph or lab findings that were available and plan of care are documented below.        ONCOLOGIC HISTORY:  Ductal Carcinoma in Situ of the left breast, ER 94%, stage 0, pTis, pNx, pMx  Desiree was seen in initial medical oncology and hematology consultation on 8/7/2021 regarding bilateral pulmonary emboli in the setting of a recent left mastectomy for DCIS.   Desiree established care and a well visit with ENRIQUE Hicks on 4/13/2021.  A Pap smear was completed and she was incidentally noted to have an elevated blood pressure and placed on lisinopril with recommendations for close monitoring of BP.  Orders were also given for routine screening arrangements were made for bilateral mammogram.     Bilateral mammogram at [facility] on 4/19/2021  Left breast with multiple groups of calcifications, which have increased in size and number

## 2024-05-01 ASSESSMENT — ENCOUNTER SYMPTOMS
GASTROINTESTINAL NEGATIVE: 1
RESPIRATORY NEGATIVE: 1
VOMITING: 0
SORE THROAT: 0
BLOOD IN STOOL: 0
CONSTIPATION: 0
NAUSEA: 0
EYE PAIN: 0
WHEEZING: 0
EYE DISCHARGE: 0
COUGH: 0
EYE REDNESS: 0
ABDOMINAL PAIN: 0
EYES NEGATIVE: 1
DIARRHEA: 0
BACK PAIN: 0
SHORTNESS OF BREATH: 0

## 2024-05-21 DIAGNOSIS — R23.2 HOT FLASHES RELATED TO AROMATASE INHIBITOR THERAPY: ICD-10-CM

## 2024-05-21 DIAGNOSIS — T45.1X5A HOT FLASHES RELATED TO AROMATASE INHIBITOR THERAPY: ICD-10-CM

## 2024-05-21 RX ORDER — VENLAFAXINE HYDROCHLORIDE 75 MG/1
75 CAPSULE, EXTENDED RELEASE ORAL DAILY
Qty: 30 CAPSULE | Refills: 3 | Status: SHIPPED | OUTPATIENT
Start: 2024-05-21

## 2024-06-03 DIAGNOSIS — Z12.31 VISIT FOR SCREENING MAMMOGRAM: Primary | ICD-10-CM

## 2024-06-11 DIAGNOSIS — D05.12 INTRADUCTAL CARCINOMA IN SITU OF LEFT BREAST: ICD-10-CM

## 2024-06-12 RX ORDER — LETROZOLE 2.5 MG/1
TABLET, FILM COATED ORAL
Qty: 90 TABLET | Refills: 1 | Status: SHIPPED | OUTPATIENT
Start: 2024-06-12

## 2024-06-17 SDOH — ECONOMIC STABILITY: FOOD INSECURITY: WITHIN THE PAST 12 MONTHS, YOU WORRIED THAT YOUR FOOD WOULD RUN OUT BEFORE YOU GOT MONEY TO BUY MORE.: NEVER TRUE

## 2024-06-17 SDOH — ECONOMIC STABILITY: INCOME INSECURITY: HOW HARD IS IT FOR YOU TO PAY FOR THE VERY BASICS LIKE FOOD, HOUSING, MEDICAL CARE, AND HEATING?: NOT VERY HARD

## 2024-06-17 SDOH — ECONOMIC STABILITY: FOOD INSECURITY: WITHIN THE PAST 12 MONTHS, THE FOOD YOU BOUGHT JUST DIDN'T LAST AND YOU DIDN'T HAVE MONEY TO GET MORE.: NEVER TRUE

## 2024-06-17 SDOH — ECONOMIC STABILITY: TRANSPORTATION INSECURITY
IN THE PAST 12 MONTHS, HAS LACK OF TRANSPORTATION KEPT YOU FROM MEETINGS, WORK, OR FROM GETTING THINGS NEEDED FOR DAILY LIVING?: NO

## 2024-06-19 ENCOUNTER — HOSPITAL ENCOUNTER (OUTPATIENT)
Dept: WOMENS IMAGING | Age: 54
Discharge: HOME | End: 2024-06-19
Payer: COMMERCIAL

## 2024-06-19 DIAGNOSIS — Z12.31 VISIT FOR SCREENING MAMMOGRAM: ICD-10-CM

## 2024-06-19 DIAGNOSIS — Z79.811 LONG TERM (CURRENT) USE OF AROMATASE INHIBITORS: ICD-10-CM

## 2024-06-19 DIAGNOSIS — Z78.0 POSTMENOPAUSAL: ICD-10-CM

## 2024-06-19 PROCEDURE — G0279 TOMOSYNTHESIS, MAMMO: HCPCS

## 2024-06-19 PROCEDURE — 77080 DXA BONE DENSITY AXIAL: CPT

## 2024-06-20 ENCOUNTER — OFFICE VISIT (OUTPATIENT)
Dept: PRIMARY CARE CLINIC | Age: 54
End: 2024-06-20
Payer: COMMERCIAL

## 2024-06-20 VITALS
TEMPERATURE: 97.7 F | HEART RATE: 78 BPM | SYSTOLIC BLOOD PRESSURE: 110 MMHG | OXYGEN SATURATION: 96 % | BODY MASS INDEX: 32.47 KG/M2 | WEIGHT: 202 LBS | DIASTOLIC BLOOD PRESSURE: 60 MMHG | HEIGHT: 66 IN | RESPIRATION RATE: 16 BRPM

## 2024-06-20 DIAGNOSIS — Z13.220 ENCOUNTER FOR SCREENING FOR LIPID DISORDER: ICD-10-CM

## 2024-06-20 DIAGNOSIS — Z00.00 WELL ADULT EXAM: Primary | ICD-10-CM

## 2024-06-20 DIAGNOSIS — Z13.1 SCREENING FOR DIABETES MELLITUS: ICD-10-CM

## 2024-06-20 DIAGNOSIS — I10 PRIMARY HYPERTENSION: ICD-10-CM

## 2024-06-20 DIAGNOSIS — N60.99 ATYPICAL DUCTAL HYPERPLASIA OF BREAST: ICD-10-CM

## 2024-06-20 DIAGNOSIS — D05.12 DUCTAL CARCINOMA IN SITU (DCIS) OF LEFT BREAST: ICD-10-CM

## 2024-06-20 LAB
ALBUMIN SERPL-MCNC: 4.4 G/DL (ref 3.5–5.2)
ALP SERPL-CCNC: 141 U/L (ref 35–104)
ALT SERPL-CCNC: 19 U/L (ref 5–33)
ANION GAP SERPL CALCULATED.3IONS-SCNC: 10 MMOL/L (ref 7–19)
AST SERPL-CCNC: 20 U/L (ref 5–32)
BILIRUB SERPL-MCNC: 0.3 MG/DL (ref 0.2–1.2)
BUN SERPL-MCNC: 13 MG/DL (ref 6–20)
CALCIUM SERPL-MCNC: 9.5 MG/DL (ref 8.6–10)
CHLORIDE SERPL-SCNC: 103 MMOL/L (ref 98–111)
CHOLEST SERPL-MCNC: 200 MG/DL (ref 160–199)
CO2 SERPL-SCNC: 27 MMOL/L (ref 22–29)
CREAT SERPL-MCNC: 0.6 MG/DL (ref 0.5–0.9)
GLUCOSE SERPL-MCNC: 88 MG/DL (ref 74–109)
HDLC SERPL-MCNC: 60 MG/DL (ref 65–121)
LDLC SERPL CALC-MCNC: 124 MG/DL
POTASSIUM SERPL-SCNC: 4.4 MMOL/L (ref 3.5–5)
PROT SERPL-MCNC: 7.7 G/DL (ref 6.6–8.7)
SODIUM SERPL-SCNC: 140 MMOL/L (ref 136–145)
TRIGL SERPL-MCNC: 82 MG/DL (ref 0–149)

## 2024-06-20 PROCEDURE — 3078F DIAST BP <80 MM HG: CPT | Performed by: NURSE PRACTITIONER

## 2024-06-20 PROCEDURE — 3074F SYST BP LT 130 MM HG: CPT | Performed by: NURSE PRACTITIONER

## 2024-06-20 PROCEDURE — 99396 PREV VISIT EST AGE 40-64: CPT | Performed by: NURSE PRACTITIONER

## 2024-06-20 RX ORDER — TIRZEPATIDE 2.5 MG/.5ML
2.5 INJECTION, SOLUTION SUBCUTANEOUS WEEKLY
Qty: 2 ML | Refills: 0 | Status: SHIPPED | OUTPATIENT
Start: 2024-06-20

## 2024-06-20 ASSESSMENT — ENCOUNTER SYMPTOMS
RESPIRATORY NEGATIVE: 1
GASTROINTESTINAL NEGATIVE: 1
EYES NEGATIVE: 1

## 2024-06-20 NOTE — PROGRESS NOTES
with calories and exercise. [place] diet is best to follow Exercise regularly 3-5 x a week at least 30min at a time . Must eat protein  3. Zofran if needed for nausea  4. Make sure you dont over eat , treat any constipation, stool softener or miralax  5. May start an over counter anti acid if needed.     If not available try phentermine is once in the am, it is a short term 6 month medication to aid in weight loss. You must make changes in your every day life with diet and exercise. You must come in monthly for BP check and weight check. I will have to run a [place] report on you since it is a controlled substance.     Electronically signed by ENRIQUE Garcia CNP on 6/20/2024 at 10:21 AM    EMR Dragon/transcription disclaimer:  Much of thisencounter note is electronic transcription/translation of spoken language to printed texts.  The electronic translation of spoken language may be erroneous, or at times, nonsensical words or phrases may be inadvertentlytranscribed.  Although I have reviewed the note for such errors, some may still exist.

## 2024-06-20 NOTE — PATIENT INSTRUCTIONS
Continue with oncology  Mammogram with dr chaudhary yearly  Pap every 3 years.  Patient Instructions   1.zepbound for weight loss. It is a once a week injection starting at 2.5mg. after a month call if you want to increase to the 5mg dose. There is also a 7.5mg dose and 10mg dose. Side effects can be GI Upset, constipation, acid reflux and nausea  2. Be accountable for what you eat, lose it sree or my fitness pal will help you keep up with calories and exercise. [place] diet is best to follow Exercise regularly 3-5 x a week at least 30min at a time . Must eat protein  3. Zofran if needed for nausea  4. Make sure you dont over eat , treat any constipation, stool softener or miralax  5. May start an over counter anti acid if needed.     If not available try phentermine is once in the am, it is a short term 6 month medication to aid in weight loss. You must make changes in your every day life with diet and exercise. You must come in monthly for BP check and weight check. I will have to run a [place] report on you since it is a controlled substance.

## 2024-07-01 RX ORDER — ERGOCALCIFEROL 1.25 MG/1
CAPSULE ORAL
Qty: 12 CAPSULE | Refills: 0 | Status: SHIPPED | OUTPATIENT
Start: 2024-07-01

## 2024-07-16 DIAGNOSIS — I10 PRIMARY HYPERTENSION: ICD-10-CM

## 2024-07-16 RX ORDER — TIRZEPATIDE 2.5 MG/.5ML
2.5 INJECTION, SOLUTION SUBCUTANEOUS WEEKLY
Qty: 2 ML | Refills: 0 | Status: SHIPPED | OUTPATIENT
Start: 2024-07-16

## 2024-08-20 DIAGNOSIS — I10 PRIMARY HYPERTENSION: ICD-10-CM

## 2024-08-20 RX ORDER — TIRZEPATIDE 2.5 MG/.5ML
2.5 INJECTION, SOLUTION SUBCUTANEOUS WEEKLY
Qty: 2 ML | Refills: 0 | Status: SHIPPED | OUTPATIENT
Start: 2024-08-20

## 2024-09-08 DIAGNOSIS — T45.1X5A HOT FLASHES RELATED TO AROMATASE INHIBITOR THERAPY: ICD-10-CM

## 2024-09-08 DIAGNOSIS — R23.2 HOT FLASHES RELATED TO AROMATASE INHIBITOR THERAPY: ICD-10-CM

## 2024-09-08 RX ORDER — VENLAFAXINE HYDROCHLORIDE 75 MG/1
75 CAPSULE, EXTENDED RELEASE ORAL DAILY
Qty: 30 CAPSULE | Refills: 0 | Status: SHIPPED | OUTPATIENT
Start: 2024-09-08

## 2024-09-20 ENCOUNTER — OFFICE VISIT (OUTPATIENT)
Dept: PRIMARY CARE CLINIC | Age: 54
End: 2024-09-20
Payer: COMMERCIAL

## 2024-09-20 VITALS
WEIGHT: 193.2 LBS | OXYGEN SATURATION: 97 % | RESPIRATION RATE: 18 BRPM | HEIGHT: 66 IN | TEMPERATURE: 96.8 F | DIASTOLIC BLOOD PRESSURE: 70 MMHG | HEART RATE: 89 BPM | SYSTOLIC BLOOD PRESSURE: 120 MMHG | BODY MASS INDEX: 31.05 KG/M2

## 2024-09-20 DIAGNOSIS — L01.00 IMPETIGO: ICD-10-CM

## 2024-09-20 DIAGNOSIS — N60.99 ATYPICAL DUCTAL HYPERPLASIA OF BREAST: ICD-10-CM

## 2024-09-20 DIAGNOSIS — I10 PRIMARY HYPERTENSION: Primary | ICD-10-CM

## 2024-09-20 PROCEDURE — 99213 OFFICE O/P EST LOW 20 MIN: CPT | Performed by: NURSE PRACTITIONER

## 2024-09-20 PROCEDURE — 3078F DIAST BP <80 MM HG: CPT | Performed by: NURSE PRACTITIONER

## 2024-09-20 PROCEDURE — 3074F SYST BP LT 130 MM HG: CPT | Performed by: NURSE PRACTITIONER

## 2024-09-20 RX ORDER — LISINOPRIL 5 MG/1
TABLET ORAL
Qty: 45 TABLET | Refills: 3 | Status: SHIPPED | OUTPATIENT
Start: 2024-09-20

## 2024-09-20 RX ORDER — ERGOCALCIFEROL 1.25 MG/1
50000 CAPSULE, LIQUID FILLED ORAL WEEKLY
Qty: 12 CAPSULE | Refills: 3 | Status: SHIPPED | OUTPATIENT
Start: 2024-09-20

## 2024-09-20 RX ORDER — SULFAMETHOXAZOLE/TRIMETHOPRIM 800-160 MG
1 TABLET ORAL 2 TIMES DAILY
Qty: 20 TABLET | Refills: 0 | Status: SHIPPED | OUTPATIENT
Start: 2024-09-20 | End: 2024-09-30

## 2024-09-20 RX ORDER — TRIAMCINOLONE ACETONIDE 1 MG/G
CREAM TOPICAL
Qty: 1 EACH | Refills: 1 | Status: SHIPPED | OUTPATIENT
Start: 2024-09-20

## 2024-09-20 RX ORDER — TIRZEPATIDE 5 MG/.5ML
5 INJECTION, SOLUTION SUBCUTANEOUS WEEKLY
Qty: 2 ML | Refills: 2 | Status: SHIPPED | OUTPATIENT
Start: 2024-09-20

## 2024-09-20 RX ORDER — ERGOCALCIFEROL 1.25 MG/1
50000 CAPSULE, LIQUID FILLED ORAL WEEKLY
COMMUNITY
End: 2024-09-20 | Stop reason: SDUPTHER

## 2024-10-11 DIAGNOSIS — T45.1X5A HOT FLASHES RELATED TO AROMATASE INHIBITOR THERAPY: ICD-10-CM

## 2024-10-11 DIAGNOSIS — R23.2 HOT FLASHES RELATED TO AROMATASE INHIBITOR THERAPY: ICD-10-CM

## 2024-10-11 RX ORDER — VENLAFAXINE HYDROCHLORIDE 75 MG/1
75 CAPSULE, EXTENDED RELEASE ORAL DAILY
Qty: 30 CAPSULE | Refills: 5 | Status: SHIPPED | OUTPATIENT
Start: 2024-10-11

## 2024-10-25 ENCOUNTER — TELEPHONE (OUTPATIENT)
Dept: HEMATOLOGY | Age: 54
End: 2024-10-25

## 2024-10-25 NOTE — TELEPHONE ENCOUNTER
Called Patient and reminded patient of their appointment on 10/29/2024 and patient confirmed they would be here. Reminded patient to just come at appointment time, and to not come at the lab appointment time. Reminded patient that we will not check them in any more than 30 minutes before appointment time.  We have now moved to the [facility] cancer center that is located between our old office and the ER at the [facility]. Letting the Pt know that our front entrance faces the  [place].

## 2024-10-29 ENCOUNTER — OFFICE VISIT (OUTPATIENT)
Dept: HEMATOLOGY | Age: 54
End: 2024-10-29
Payer: COMMERCIAL

## 2024-10-29 ENCOUNTER — HOSPITAL ENCOUNTER (OUTPATIENT)
Dept: INFUSION THERAPY | Age: 54
Discharge: HOME | End: 2024-10-29
Payer: COMMERCIAL

## 2024-10-29 VITALS
WEIGHT: 190.8 LBS | SYSTOLIC BLOOD PRESSURE: 120 MMHG | BODY MASS INDEX: 30.67 KG/M2 | HEIGHT: 66 IN | TEMPERATURE: 97.8 F | OXYGEN SATURATION: 99 % | DIASTOLIC BLOOD PRESSURE: 60 MMHG | HEART RATE: 92 BPM

## 2024-10-29 DIAGNOSIS — D05.12 INTRADUCTAL CARCINOMA IN SITU OF LEFT BREAST: Primary | ICD-10-CM

## 2024-10-29 DIAGNOSIS — Z78.0 POSTMENOPAUSAL: ICD-10-CM

## 2024-10-29 DIAGNOSIS — Z79.899 ENCOUNTER FOR MONITORING ADJUVANT HORMONAL THERAPY: ICD-10-CM

## 2024-10-29 DIAGNOSIS — D05.12 INTRADUCTAL CARCINOMA IN SITU OF LEFT BREAST: ICD-10-CM

## 2024-10-29 DIAGNOSIS — T45.1X5A HOT FLASHES RELATED TO AROMATASE INHIBITOR THERAPY: ICD-10-CM

## 2024-10-29 DIAGNOSIS — Z51.81 ENCOUNTER FOR MONITORING ADJUVANT HORMONAL THERAPY: ICD-10-CM

## 2024-10-29 DIAGNOSIS — R23.2 HOT FLASHES RELATED TO AROMATASE INHIBITOR THERAPY: ICD-10-CM

## 2024-10-29 DIAGNOSIS — Z86.711 PERSONAL HISTORY OF PULMONARY EMBOLISM: ICD-10-CM

## 2024-10-29 LAB
ALBUMIN SERPL-MCNC: 4.2 G/DL (ref 3.5–5.2)
ALP SERPL-CCNC: 114 U/L (ref 35–104)
ALT SERPL-CCNC: 21 U/L (ref 5–33)
ANION GAP SERPL CALCULATED.3IONS-SCNC: 9 MMOL/L (ref 7–19)
AST SERPL-CCNC: 24 U/L (ref 5–32)
BASOPHILS # BLD: 0.04 K/UL (ref 0.01–0.08)
BASOPHILS NFR BLD: 0.5 % (ref 0.1–1.2)
BILIRUB SERPL-MCNC: <0.2 MG/DL (ref 0–1.2)
BUN SERPL-MCNC: 18 MG/DL (ref 6–20)
CALCIUM SERPL-MCNC: 9.2 MG/DL (ref 8.6–10)
CHLORIDE SERPL-SCNC: 102 MMOL/L (ref 98–107)
CO2 SERPL-SCNC: 30 MMOL/L (ref 22–29)
CREAT SERPL-MCNC: 0.8 MG/DL (ref 0.5–0.9)
EOSINOPHIL # BLD: 0.19 K/UL (ref 0.04–0.54)
EOSINOPHIL NFR BLD: 2.1 % (ref 0.7–7)
ERYTHROCYTE [DISTWIDTH] IN BLOOD BY AUTOMATED COUNT: 13.3 % (ref 11.7–14.4)
GLUCOSE SERPL-MCNC: 93 MG/DL (ref 70–99)
HCT VFR BLD AUTO: 37.8 % (ref 34.1–44.9)
HGB BLD-MCNC: 12.7 G/DL (ref 11.2–15.7)
LYMPHOCYTES # BLD: 2.4 K/UL (ref 1.18–3.74)
LYMPHOCYTES NFR BLD: 27 % (ref 19.3–53.1)
MCH RBC QN AUTO: 31.3 PG (ref 25.6–32.2)
MCHC RBC AUTO-ENTMCNC: 33.6 G/DL (ref 32.3–35.5)
MCV RBC AUTO: 93.1 FL (ref 79.4–94.8)
MONOCYTES # BLD: 0.63 K/UL (ref 0.24–0.82)
MONOCYTES NFR BLD: 7.1 % (ref 4.7–12.5)
NEUTROPHILS # BLD: 5.59 K/UL (ref 1.56–6.13)
NEUTS SEG NFR BLD: 63 % (ref 34–71.1)
PLATELET # BLD AUTO: 328 K/UL (ref 182–369)
PMV BLD AUTO: 9.2 FL (ref 7.4–10.4)
POTASSIUM SERPL-SCNC: 4.3 MMOL/L (ref 3.5–5.1)
PROT SERPL-MCNC: 7.5 G/DL (ref 6.4–8.3)
RBC # BLD AUTO: 4.06 M/UL (ref 3.93–5.22)
SODIUM SERPL-SCNC: 141 MMOL/L (ref 136–145)
WBC # BLD AUTO: 8.88 K/UL (ref 3.98–10.04)

## 2024-10-29 PROCEDURE — 80053 COMPREHEN METABOLIC PANEL: CPT

## 2024-10-29 PROCEDURE — 36415 COLL VENOUS BLD VENIPUNCTURE: CPT

## 2024-10-29 PROCEDURE — 99213 OFFICE O/P EST LOW 20 MIN: CPT

## 2024-10-29 PROCEDURE — 85025 COMPLETE CBC W/AUTO DIFF WBC: CPT

## 2024-10-29 NOTE — PROGRESS NOTES
months breast cancer and side effects to adjuvant endocrine therapy or sooner if needed  Continue to follow with other medical providers as recommended.  Labs at next visit: CBC and CMP: Consider vitamin D level    EMR Dragon/Transcription disclaimer:   Much of this encounter note is an electronic transcription/translation of spoken language to printed text. The electronic translation of spoken language may permit erroneous, or at times, nonsensical words or phrases to be inadvertently transcribed; although attempts have made to review the note for such errors, some may still exist.  Please excuse any unrecognized transcription errors and contact us if the error is unintelligible or needs documented correction.  Also, portions of this note have been copied forward, however, changed to reflect the most current clinical status of this patient.    I, Alfonso Cornelius, am starting this note as a registered nurse for ENRIQUE Lucas.  Electronically signed by ENRIQUE Rosenbaum on 11/1/2024 at 5:48 PM

## 2024-11-01 ASSESSMENT — ENCOUNTER SYMPTOMS
CONSTIPATION: 0
NAUSEA: 0
RESPIRATORY NEGATIVE: 1
ABDOMINAL PAIN: 0
BACK PAIN: 0
COUGH: 0
SHORTNESS OF BREATH: 0
GASTROINTESTINAL NEGATIVE: 1
WHEEZING: 0
DIARRHEA: 0
EYES NEGATIVE: 1
EYE PAIN: 0
VOMITING: 0
EYE DISCHARGE: 0
EYE REDNESS: 0
SORE THROAT: 0
BLOOD IN STOOL: 0

## 2024-11-12 RX ORDER — TIRZEPATIDE 5 MG/.5ML
5 INJECTION, SOLUTION SUBCUTANEOUS WEEKLY
Qty: 2 ML | Refills: 2 | OUTPATIENT
Start: 2024-11-12

## 2024-11-12 RX ORDER — TIRZEPATIDE 7.5 MG/.5ML
7.5 INJECTION, SOLUTION SUBCUTANEOUS WEEKLY
Qty: 2 ML | Refills: 2 | Status: SHIPPED | OUTPATIENT
Start: 2024-11-12

## 2024-12-05 DIAGNOSIS — D05.12 INTRADUCTAL CARCINOMA IN SITU OF LEFT BREAST: ICD-10-CM

## 2024-12-05 RX ORDER — LETROZOLE 2.5 MG/1
TABLET, FILM COATED ORAL
Qty: 90 TABLET | Refills: 0 | Status: SHIPPED | OUTPATIENT
Start: 2024-12-05

## 2024-12-16 ENCOUNTER — HOSPITAL ENCOUNTER (OUTPATIENT)
Dept: MRI IMAGING | Age: 54
Discharge: HOME | End: 2024-12-16
Payer: COMMERCIAL

## 2024-12-16 DIAGNOSIS — D05.12 INTRADUCTAL CARCINOMA IN SITU OF LEFT BREAST: ICD-10-CM

## 2024-12-16 PROCEDURE — C8908 MRI W/O FOL W/CONT, BREAST,: HCPCS

## 2024-12-16 PROCEDURE — 6360000004 HC RX CONTRAST MEDICATION: Performed by: NURSE PRACTITIONER

## 2024-12-16 PROCEDURE — A9577 INJ MULTIHANCE: HCPCS | Performed by: NURSE PRACTITIONER

## 2024-12-16 RX ADMIN — GADOBENATE DIMEGLUMINE 18 ML: 529 INJECTION, SOLUTION INTRAVENOUS at 08:44

## 2024-12-20 ENCOUNTER — OFFICE VISIT (OUTPATIENT)
Dept: PRIMARY CARE CLINIC | Age: 54
End: 2024-12-20
Payer: COMMERCIAL

## 2024-12-20 VITALS
RESPIRATION RATE: 18 BRPM | SYSTOLIC BLOOD PRESSURE: 140 MMHG | HEIGHT: 66 IN | HEART RATE: 88 BPM | WEIGHT: 179 LBS | TEMPERATURE: 98.2 F | OXYGEN SATURATION: 98 % | BODY MASS INDEX: 28.77 KG/M2 | DIASTOLIC BLOOD PRESSURE: 90 MMHG

## 2024-12-20 DIAGNOSIS — I10 PRIMARY HYPERTENSION: Primary | ICD-10-CM

## 2024-12-20 DIAGNOSIS — N60.99 ATYPICAL DUCTAL HYPERPLASIA OF BREAST: ICD-10-CM

## 2024-12-20 PROCEDURE — 99213 OFFICE O/P EST LOW 20 MIN: CPT | Performed by: NURSE PRACTITIONER

## 2024-12-20 PROCEDURE — 3077F SYST BP >= 140 MM HG: CPT | Performed by: NURSE PRACTITIONER

## 2024-12-20 PROCEDURE — 3080F DIAST BP >= 90 MM HG: CPT | Performed by: NURSE PRACTITIONER

## 2024-12-20 RX ORDER — TIRZEPATIDE 7.5 MG/.5ML
7.5 INJECTION, SOLUTION SUBCUTANEOUS WEEKLY
Qty: 2 ML | Refills: 4 | Status: SHIPPED | OUTPATIENT
Start: 2024-12-20

## 2024-12-20 RX ORDER — DOXYCYCLINE HYCLATE 100 MG
100 TABLET ORAL 2 TIMES DAILY
Qty: 20 TABLET | Refills: 0 | Status: SHIPPED | OUTPATIENT
Start: 2024-12-20 | End: 2024-12-30

## 2024-12-20 ASSESSMENT — ENCOUNTER SYMPTOMS
GASTROINTESTINAL NEGATIVE: 1
RESPIRATORY NEGATIVE: 1
EYES NEGATIVE: 1

## 2024-12-20 NOTE — PATIENT INSTRUCTIONS
Monitor your blood pressure every a.m And once random during the day.  Record them.  The goal is top number under 140 and bottom number under 80.   If running high take 5mg full dose lisinopril

## 2024-12-20 NOTE — PROGRESS NOTES
[facility]  [place] [id number] [facility]  [place] [address]  Dept: [phone]  Dept Fax: [phone]  Loc: [phone]    Desiree Gimenez is a 54 y.o. female who presents today for her medical conditions/complaints as noted below.  Desiree Gimenez is c/o of 3 Month Follow-Up (Patient presents today for 3 month follow up.)        HPI:     HPI   Chief Complaint   Patient presents with    3 Month Follow-Up     Patient presents today for 3 month follow up.   She is on zepbound and had to see dietician she has lost total 26 jpounds since April. She is unsure if insurance will continue.  She is having some constipation.   Past Medical History:   Diagnosis Date    Breast cancer (HCC)     Cancer (HCC) 2021    DCIS Left Breast      delivery delivered     Ductal carcinoma in situ (DCIS) of left breast 2021    History of D&C     1993    Hx of blood clots     Post op  left Mastectomy Eliquis    Hypertension       Past Surgical History:   Procedure Laterality Date    APPENDECTOMY      BREAST RECONSTRUCTION Left 2022     SECTION      DILATION AND CURETTAGE OF UTERUS      X2    HYSTERECTOMY (CERVIX STATUS UNKNOWN) Bilateral 2022    ROBOTIC TOTAL LAPAROSCOPIC HYSTERECTOMY, BILATERAL SALPINGO-OOPHORECTOMY, CYSTOSCOPY performed by Adelaida Maier MD at [facility] OR    HYSTERECTOMY, VAGINAL      MASTECTOMY Left 2021    LEFT NIPPLE SPARING MASTECTOMY VIA WISE PATTERN, TISSUE EXPANDER RECONSTRUCTION, PEC BLOCK performed by Gilmar Quevedo MD at [facility] OR    OVARY REMOVAL      US BREAST BIOPSY W LOC DEVICE 1ST LESION LEFT Left 2021    US BREAST NEEDLE BIOPSY LEFT 2021 [facility] GENERAL SURGERY           2024    11:58 AM 2024    11:42 AM 10/29/2024     2:20 PM 2024     4:14 PM 2024     9:14 AM 2024     2:36 PM   Vitals   SYSTOLIC 140 142 120 120 110 118   DIASTOLIC 90 108 60 70 60 68   Pulse  88 92 89 78 99   Temp

## 2025-02-12 ENCOUNTER — OFFICE VISIT (OUTPATIENT)
Dept: PRIMARY CARE CLINIC | Age: 55
End: 2025-02-12

## 2025-02-12 VITALS
WEIGHT: 177.6 LBS | TEMPERATURE: 97.4 F | SYSTOLIC BLOOD PRESSURE: 122 MMHG | HEIGHT: 65 IN | DIASTOLIC BLOOD PRESSURE: 94 MMHG | OXYGEN SATURATION: 98 % | BODY MASS INDEX: 29.59 KG/M2 | RESPIRATION RATE: 18 BRPM | HEART RATE: 87 BPM

## 2025-02-12 DIAGNOSIS — J06.9 VIRAL URI: Primary | ICD-10-CM

## 2025-02-12 DIAGNOSIS — R50.9 FEVER, UNSPECIFIED FEVER CAUSE: ICD-10-CM

## 2025-02-12 LAB
INFLUENZA A ANTIBODY: NEGATIVE
INFLUENZA B ANTIBODY: NEGATIVE
SARS-COV-2 N GENE RESP QL NAA+PROBE: NOT DETECTED

## 2025-02-12 RX ORDER — DEXTROMETHORPHAN HYDROBROMIDE AND PROMETHAZINE HYDROCHLORIDE 15; 6.25 MG/5ML; MG/5ML
5 SYRUP ORAL 4 TIMES DAILY PRN
Qty: 118 ML | Refills: 0 | Status: SHIPPED | OUTPATIENT
Start: 2025-02-12 | End: 2025-02-19

## 2025-02-12 ASSESSMENT — ENCOUNTER SYMPTOMS
COUGH: 0
SORE THROAT: 1
DIARRHEA: 0
VOMITING: 1
NAUSEA: 1

## 2025-02-12 NOTE — PROGRESS NOTES
Desiree Gimenez (:  1970) is a 54 y.o. female,Established patient, here for evaluation of the following chief complaint(s):  New Patient (Pt new to provider. ), Ear Pain (Pt is having bilateral ear pain and feel full), and Fever (Pt went to school nurse today and she was running a fever of 100.)      Assessment & Plan   ASSESSMENT/PLAN:  1. Viral URI  -     promethazine-dextromethorphan (PROMETHAZINE-DM) 6.25-15 MG/5ML syrup; Take 5 mLs by mouth 4 times daily as needed for Cough, Disp-118 mL, R-0Normal  2. Fever, unspecified fever cause  -     POCT Influenza A/B      Symptoms very likely due to a viral infection.  Flu and COVID swabs were obtained in office and negative at this time.  I have sent through some Promethazine DM for symptomatic management of her cough. Many illnesses are caused by viruses. These conditions usually run their course in 7-14 days.  Antibiotics do not help fight viral infections and are not needed at this time. Viral syndromes are treated with symptomatic support. You may take tylenol or ibuprofen for fever or aches and pains. Stay hydrated by taking sips of water or non caffeinated, noncarbonated, and nonalcoholic beverages throughout the day. For sore throat, you may gargle with warm salt water, use lozenges or sprays. Using a daily antihistamine such as Claritin, Zyrtec or Allegra can help with upper respiratory symptoms. Benadryl can be sedating but is helpful at drying secretions and may be taken at night. Call if you have a fever greater than 102 F or if symptoms do not improve. Your provider may also send you in prescription medications depending on your symptoms and their severity. Take all medications as directed on package unless specifically told otherwise.              Return if symptoms worsen or fail to improve.         Subjective   SUBJECTIVE/OBJECTIVE:  Desiree Gimenez is a 54 y.o. female who presents due to upper respiratory symptoms.  Patient has been

## 2025-02-14 ASSESSMENT — ENCOUNTER SYMPTOMS
WHEEZING: 0
BLOOD IN STOOL: 0
ABDOMINAL PAIN: 0
CHEST TIGHTNESS: 0
SHORTNESS OF BREATH: 0

## 2025-03-08 DIAGNOSIS — D05.12 INTRADUCTAL CARCINOMA IN SITU OF LEFT BREAST: ICD-10-CM

## 2025-03-09 RX ORDER — LETROZOLE 2.5 MG/1
2.5 TABLET, FILM COATED ORAL DAILY
Qty: 90 TABLET | Refills: 1 | Status: SHIPPED | OUTPATIENT
Start: 2025-03-09

## 2025-04-18 DIAGNOSIS — T45.1X5A HOT FLASHES RELATED TO AROMATASE INHIBITOR THERAPY: ICD-10-CM

## 2025-04-18 DIAGNOSIS — R23.2 HOT FLASHES RELATED TO AROMATASE INHIBITOR THERAPY: ICD-10-CM

## 2025-04-18 RX ORDER — VENLAFAXINE HYDROCHLORIDE 75 MG/1
75 CAPSULE, EXTENDED RELEASE ORAL DAILY
Qty: 30 CAPSULE | Refills: 5 | Status: SHIPPED | OUTPATIENT
Start: 2025-04-18

## 2025-04-24 ENCOUNTER — TELEPHONE (OUTPATIENT)
Dept: HEMATOLOGY | Age: 55
End: 2025-04-24

## 2025-04-24 NOTE — TELEPHONE ENCOUNTER
Called Patient and reminded patient of their appointment on 04/29/2025 and patient confirmed they would be here. Reminded patient to just come at appointment time, and to not come at the lab appointment time. Reminded patient that we will not check them in any more than 30 minutes before appointment time.  We have now moved to the [facility] cancer [place] that is located between our old office and the ER at the [facility]. Letting the Pt know that our front entrance faces the  [place]. Reminded pt to eat well and be well hydrated for their labs.

## 2025-04-29 ENCOUNTER — HOSPITAL ENCOUNTER (OUTPATIENT)
Dept: INFUSION THERAPY | Age: 55
Discharge: HOME | End: 2025-04-29
Payer: COMMERCIAL

## 2025-04-29 ENCOUNTER — OFFICE VISIT (OUTPATIENT)
Dept: HEMATOLOGY | Age: 55
End: 2025-04-29
Payer: COMMERCIAL

## 2025-04-29 VITALS
OXYGEN SATURATION: 97 % | WEIGHT: 174 LBS | SYSTOLIC BLOOD PRESSURE: 104 MMHG | BODY MASS INDEX: 28.99 KG/M2 | HEART RATE: 89 BPM | HEIGHT: 65 IN | TEMPERATURE: 97.8 F | DIASTOLIC BLOOD PRESSURE: 76 MMHG

## 2025-04-29 DIAGNOSIS — D05.12 INTRADUCTAL CARCINOMA IN SITU OF LEFT BREAST: ICD-10-CM

## 2025-04-29 DIAGNOSIS — E55.9 VITAMIN D DEFICIENCY: ICD-10-CM

## 2025-04-29 DIAGNOSIS — D05.12 INTRADUCTAL CARCINOMA IN SITU OF LEFT BREAST: Primary | ICD-10-CM

## 2025-04-29 DIAGNOSIS — Z51.81 ENCOUNTER FOR MONITORING ADJUVANT HORMONAL THERAPY: ICD-10-CM

## 2025-04-29 DIAGNOSIS — T45.1X5A HOT FLASHES RELATED TO AROMATASE INHIBITOR THERAPY: ICD-10-CM

## 2025-04-29 DIAGNOSIS — R23.2 HOT FLASHES RELATED TO AROMATASE INHIBITOR THERAPY: ICD-10-CM

## 2025-04-29 DIAGNOSIS — Z79.899 ENCOUNTER FOR MONITORING ADJUVANT HORMONAL THERAPY: ICD-10-CM

## 2025-04-29 DIAGNOSIS — Z78.0 POSTMENOPAUSAL: ICD-10-CM

## 2025-04-29 DIAGNOSIS — Z86.711 PERSONAL HISTORY OF PULMONARY EMBOLISM: ICD-10-CM

## 2025-04-29 LAB
25(OH)D3 SERPL-MCNC: 89.1 NG/ML
ALBUMIN SERPL-MCNC: 4.5 G/DL (ref 3.5–5.2)
ALP SERPL-CCNC: 117 U/L (ref 35–104)
ALT SERPL-CCNC: 21 U/L (ref 5–33)
ANION GAP SERPL CALCULATED.3IONS-SCNC: 9 MMOL/L (ref 7–19)
AST SERPL-CCNC: 23 U/L (ref 5–32)
BASOPHILS # BLD: 0.04 K/UL (ref 0–0.2)
BASOPHILS NFR BLD: 0.5 % (ref 0–1)
BILIRUB SERPL-MCNC: 0.3 MG/DL (ref 0–1.2)
BUN SERPL-MCNC: 18 MG/DL (ref 6–20)
CALCIUM SERPL-MCNC: 9 MG/DL (ref 8.6–10)
CHLORIDE SERPL-SCNC: 101 MMOL/L (ref 98–107)
CO2 SERPL-SCNC: 29 MMOL/L (ref 22–29)
CREAT SERPL-MCNC: 0.8 MG/DL (ref 0.5–0.9)
EOSINOPHIL # BLD: 0.25 K/UL (ref 0–0.6)
EOSINOPHIL NFR BLD: 3.1 % (ref 0–5)
ERYTHROCYTE [DISTWIDTH] IN BLOOD BY AUTOMATED COUNT: 12.7 % (ref 11.5–14.5)
GLUCOSE SERPL-MCNC: 90 MG/DL (ref 70–99)
HCT VFR BLD AUTO: 38.3 % (ref 37–47)
HGB BLD-MCNC: 13.3 G/DL (ref 12–16)
LYMPHOCYTES # BLD: 1.95 K/UL (ref 1.1–4.5)
LYMPHOCYTES NFR BLD: 24 % (ref 20–40)
MCH RBC QN AUTO: 32.4 PG (ref 27–31)
MCHC RBC AUTO-ENTMCNC: 34.7 G/DL (ref 33–37)
MCV RBC AUTO: 93.2 FL (ref 81–99)
MONOCYTES # BLD: 0.56 K/UL (ref 0–0.9)
MONOCYTES NFR BLD: 6.9 % (ref 1–10)
NEUTROPHILS # BLD: 5.32 K/UL (ref 1.5–7.5)
NEUTS SEG NFR BLD: 65.4 % (ref 50–65)
PLATELET # BLD AUTO: 282 K/UL (ref 130–400)
PMV BLD AUTO: 9.3 FL (ref 9.4–12.3)
POTASSIUM SERPL-SCNC: 4.3 MMOL/L (ref 3.5–5.1)
PROT SERPL-MCNC: 7.4 G/DL (ref 6.4–8.3)
RBC # BLD AUTO: 4.11 M/UL (ref 4.2–5.4)
SODIUM SERPL-SCNC: 139 MMOL/L (ref 136–145)
WBC # BLD AUTO: 8.13 K/UL (ref 4.8–10.8)

## 2025-04-29 PROCEDURE — G2211 COMPLEX E/M VISIT ADD ON: HCPCS | Performed by: NURSE PRACTITIONER

## 2025-04-29 PROCEDURE — 99213 OFFICE O/P EST LOW 20 MIN: CPT

## 2025-04-29 PROCEDURE — 3074F SYST BP LT 130 MM HG: CPT | Performed by: NURSE PRACTITIONER

## 2025-04-29 PROCEDURE — 82306 VITAMIN D 25 HYDROXY: CPT

## 2025-04-29 PROCEDURE — 80053 COMPREHEN METABOLIC PANEL: CPT

## 2025-04-29 PROCEDURE — 36415 COLL VENOUS BLD VENIPUNCTURE: CPT

## 2025-04-29 PROCEDURE — 3078F DIAST BP <80 MM HG: CPT | Performed by: NURSE PRACTITIONER

## 2025-04-29 PROCEDURE — 85025 COMPLETE CBC W/AUTO DIFF WBC: CPT

## 2025-04-29 PROCEDURE — 99214 OFFICE O/P EST MOD 30 MIN: CPT | Performed by: NURSE PRACTITIONER

## 2025-04-29 NOTE — PROGRESS NOTES
Progress Note      Pt Name: Desiree Gimenez  YOB: 1970  MRN: [id number]    Date of evaluation: 04/29/2025  History Obtained From:  patient, electronic medical record    CHIEF COMPLAINT:    Chief Complaint   Patient presents with    Follow-up     Intraductal carcinoma in situ of left breast       HISTORY OF PRESENT ILLNESS:    Desiree Gimenez is a 55 y.o.  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  She is status post left sparing mastectomy with reconstruction and total laparoscopic hysterectomy and bilateral salpingo-oophorectomy.  Current recommendation is for preventive therapy with Femara 2.5 mg p.o. daily for 5 years, through May 2027.  Desiree returns today in scheduled follow-up for evaluation, lab monitoring, side effect monitoring and further treatment recommendations.      History of Present Illness  No new health concerns have been reported since the last visit. Femara is being tolerated well, with no new symptoms or side effects.     Effexor is taken for hot flashes, which are well-managed. Occasional episodes of feeling hot are experienced, but it is uncertain if these are true hot flashes or simply sensations of warmth.      Today's clinic visit to include physical assessment, review of systems, any radiograph or lab findings that were available and plan of care are documented below.        ONCOLOGIC HISTORY:  Ductal Carcinoma in Situ of the left breast, ER 94%, stage 0, pTis, pNx, pMx  Desiree was seen in initial medical oncology and hematology consultation on 8/7/2021 regarding bilateral pulmonary emboli in the setting of a recent left mastectomy for DCIS.   Desiree established care and a well visit with ENRIQUE Hicks on 4/13/2021.  A Pap smear was completed and she was incidentally

## 2025-04-30 ENCOUNTER — RESULTS FOLLOW-UP (OUTPATIENT)
Dept: HEMATOLOGY | Age: 55
End: 2025-04-30

## 2025-05-01 NOTE — TELEPHONE ENCOUNTER
Called patient and reviewed lab results, vitamin D of 89.1. Instructed that JESSICA Lucas would like for her to stop taking her weekly vitamin D and we will recheck level at next visit.  Instructed to call with any problems.  Patient v/u and is agreeable to plan.

## 2025-05-01 NOTE — TELEPHONE ENCOUNTER
----- Message from Amara HER sent at 4/30/2025  6:21 PM CDT -----  Please call and verify if she is taking her vitamin D weekly, medication list indicates that she is.  Would recommend that she discontinue weekly vitamin D with a vitamin D level of 89.1.

## 2025-05-05 RX ORDER — TIRZEPATIDE 7.5 MG/.5ML
7.5 INJECTION, SOLUTION SUBCUTANEOUS WEEKLY
Qty: 2 ML | Refills: 4 | OUTPATIENT
Start: 2025-05-05

## 2025-05-05 ASSESSMENT — ENCOUNTER SYMPTOMS
BLOOD IN STOOL: 0
DIARRHEA: 0
GASTROINTESTINAL NEGATIVE: 1
SHORTNESS OF BREATH: 0
BACK PAIN: 0
SORE THROAT: 0
CONSTIPATION: 0
EYE PAIN: 0
NAUSEA: 0
ABDOMINAL PAIN: 0
EYE DISCHARGE: 0
COUGH: 0
EYE REDNESS: 0
RESPIRATORY NEGATIVE: 1
WHEEZING: 0
VOMITING: 0
EYES NEGATIVE: 1

## 2025-05-29 RX ORDER — TIRZEPATIDE 10 MG/.5ML
INJECTION, SOLUTION SUBCUTANEOUS
Qty: 4 ML | Refills: 0 | Status: SHIPPED | OUTPATIENT
Start: 2025-05-29

## 2025-06-23 RX ORDER — TIRZEPATIDE 10 MG/.5ML
INJECTION, SOLUTION SUBCUTANEOUS
Qty: 4 ML | Refills: 1 | Status: SHIPPED | OUTPATIENT
Start: 2025-06-23

## 2025-06-30 ENCOUNTER — HOSPITAL ENCOUNTER (OUTPATIENT)
Dept: WOMENS IMAGING | Age: 55
Discharge: HOME | End: 2025-06-30
Payer: COMMERCIAL

## 2025-06-30 VITALS — WEIGHT: 167 LBS | BODY MASS INDEX: 27.82 KG/M2 | HEIGHT: 65 IN

## 2025-06-30 DIAGNOSIS — Z12.31 SCREENING MAMMOGRAM FOR BREAST CANCER: ICD-10-CM

## 2025-06-30 PROCEDURE — 77063 BREAST TOMOSYNTHESIS BI: CPT

## 2025-07-02 ENCOUNTER — RESULTS FOLLOW-UP (OUTPATIENT)
Dept: HEMATOLOGY | Age: 55
End: 2025-07-02

## 2025-08-19 RX ORDER — TIRZEPATIDE 10 MG/.5ML
10 INJECTION, SOLUTION SUBCUTANEOUS WEEKLY
Qty: 4 ML | Refills: 1 | Status: SHIPPED | OUTPATIENT
Start: 2025-08-19

## (undated) DEVICE — AEGIS 1" DISK 4MM HOLE, PEEL OPEN: Brand: MEDLINE

## (undated) DEVICE — SEALANT TISS 10 CC FIBRIN VISTASEAL

## (undated) DEVICE — SUTURE MNCRYL STRATAFIX PS 4-0 30CM

## (undated) DEVICE — GLOVE SURG SZ 65 CRM LTX FREE POLYISOPRENE POLYMER BEAD ANTI

## (undated) DEVICE — GAUZE,SPONGE,FLUFF,6"X6.75",STRL,10/TRAY: Brand: MEDLINE

## (undated) DEVICE — NEEDLE INSUF L150MM DIA2MM DISP FOR PNEUMOPERI ENDOPATH

## (undated) DEVICE — SUTURE ETHLN SZ 2-0 L30IN NONABSORBABLE BLK L36MM FSLX 3/8 1674H

## (undated) DEVICE — BLADELESS OBTURATOR: Brand: WECK VISTA

## (undated) DEVICE — GARMENT COMPR STD FOR 17IN CALF UNIF THER FLOTRN

## (undated) DEVICE — 40595 XL TRENDELENBURG POSITIONING KIT: Brand: 40595 XL TRENDELENBURG POSITIONING KIT

## (undated) DEVICE — PACK,UNIVERSAL,NO GOWNS: Brand: MEDLINE

## (undated) DEVICE — GOWN,PREVENTION PLUS,XL,ST,24/CS: Brand: MEDLINE

## (undated) DEVICE — DAVINCI: Brand: MEDLINE INDUSTRIES, INC.

## (undated) DEVICE — SUTURE PERMAHAND SZ 2-0 L18IN NONABSORBABLE BLK L26MM FS 685G

## (undated) DEVICE — JACKSON-PRATT 100CC BULB RESERVOIR: Brand: CARDINAL HEALTH

## (undated) DEVICE — GLOVE SURG SZ 85 L12IN FNGR ORTHO 126MIL CRM LTX FREE

## (undated) DEVICE — Y-TYPE TUR/BLADDER IRRIGATION SET, REGULATING CLAMP

## (undated) DEVICE — SUTURE PDS II SZ 2-0 L18IN ABSRB VLT SH L26MM 1/2 CIR TAPR Z775D

## (undated) DEVICE — 3M™ IOBAN™ 2 ANTIMICROBIAL INCISE DRAPE 6650EZ: Brand: IOBAN™ 2

## (undated) DEVICE — SUTURE MCRYL SZ 4-0 L18IN ABSRB UD L19MM PS-2 3/8 CIR PRIM Y496G

## (undated) DEVICE — GOWN,PREVENTION PLUS,2XL,ST,22/CS: Brand: MEDLINE

## (undated) DEVICE — SUTURE VCRL SZ 0 L36IN ABSRB UD L36MM CT-1 1/2 CIR J946H

## (undated) DEVICE — ADHESIVE SKIN CLSR 0.7ML TOP DERMBND ADV

## (undated) DEVICE — SOLUTION IV IRRIG WATER 1000ML POUR BRL 2F7114

## (undated) DEVICE — MAJOR CDS

## (undated) DEVICE — NEEDLE BRST LOC L5CM OD20GA N REPOSITIONABLE W/ FLEXSTRAND

## (undated) DEVICE — SUTURE MCRYL SZ 2-0 L27IN ABSRB UD L26MM SH UR-6 1/2 CIR D8550

## (undated) DEVICE — SUTURE STRATAFIX SPRL MCRYL + SZ 4-0 L12IN ABSRB UD PS-2 SXMP1B117

## (undated) DEVICE — GLOVE SURG SZ 75 CRM LTX FREE POLYISOPRENE POLYMER BEAD ANTI

## (undated) DEVICE — YANKAUER,TAPERED BULBOUS TIP,VENTED: Brand: MEDLINE

## (undated) DEVICE — SUTURE VCRL SZ 2-0 L36IN ABSRB UD L36MM CT-1 1/2 CIR J945H

## (undated) DEVICE — BLANKET WRM W29.9XL79.1IN UP BODY FORC AIR MISTRAL-AIR

## (undated) DEVICE — SYRINGE, LUER LOCK, 60ML: Brand: MEDLINE

## (undated) DEVICE — SOLUTION ANTIFOG VIS SYS CLEARIFY LAPSCP

## (undated) DEVICE — SEALER TISS L20CM DIA13MM ADV BPLR L CRV JAW OPN APPRCH

## (undated) DEVICE — Device

## (undated) DEVICE — ELECTRO LUBE IS A SINGLE PATIENT USE DEVICE THAT IS INTENDED TO BE USED ON ELECTROSURGICAL ELECTRODES TO REDUCE STICKING.: Brand: KEY SURGICAL ELECTRO LUBE

## (undated) DEVICE — SPONGE LAP W18XL18IN WHT COT 4 PLY FLD STRUNG RADPQ DISP ST

## (undated) DEVICE — INTENDED TO AID IN THE PASSING OF SUTURES THROUGH BONE AND SOFT TISSUE DURING ORTHOPEDIC SURGERY: Brand: HOFFEE SUTURE RETRIEVER

## (undated) DEVICE — VCARE MEDIUM, UTERINE MANIPULATOR, VAGINAL-CERVICAL-AHLUWALIA'S-RETRACTOR-ELEVATOR: Brand: VCARE

## (undated) DEVICE — AIRSEAL 8 MM ACCESS PORT AND LOW PROFILE OBTURATOR WITH BLADELESS OPTICAL TIP, 120 MM LENGTH: Brand: AIRSEAL

## (undated) DEVICE — BLANKET WRM W40.2XL55.9IN IORT LO BODY + MISTRAL AIR

## (undated) DEVICE — SOLUTION IRRIG 3000ML 0.9% SOD CHL USP UROMATIC PLAS CONT

## (undated) DEVICE — PROBE DTECT MARGIN F/LUMPECTOMY

## (undated) DEVICE — APPLICATOR LAP 35 CM 2 RIGID VISTASEAL

## (undated) DEVICE — CANNULA SEAL

## (undated) DEVICE — DRAIN JACKSON PRATT ROUND 15FR: Brand: CARDINAL HEALTH

## (undated) DEVICE — GOWN,PREVENTION PLUS,XLN/XL,ST,24/CS: Brand: MEDLINE

## (undated) DEVICE — COVER LT HNDL BLU PLAS

## (undated) DEVICE — CLIP INT M L GRN TI TRNSVRS GRV CHEVRON SHP W/ PRECIS TIP

## (undated) DEVICE — TRI-LUMEN FILTERED TUBE SET WITH ACTIVATED CHARCOAL FILTER: Brand: AIRSEAL

## (undated) DEVICE — Z DISCONTINUED USE 2272117 DRAPE SURG 3 QTR N INVASIVE 2 LAYR DISP

## (undated) DEVICE — TIP COVER ACCESSORY

## (undated) DEVICE — ARM DRAPE

## (undated) DEVICE — SUTURE PDS + SZ 2 0 L27IN ABSRB VLT L36MM CT 1 1 2 CIR PDP339H

## (undated) DEVICE — E-Z CLEAN, NON-STICK, PTFE COATED, ELECTROSURGICAL BLADE ELECTRODE, MODIFIED EXTENDED INSULATION, 4 INCH (10.2 CM): Brand: MEGADYNE

## (undated) DEVICE — SUTURE ABSORBABLE MONOFILAMENT 0 CT-2 12 IN STRATAFIX SPRL SXPP1B405